# Patient Record
Sex: MALE | Race: WHITE | NOT HISPANIC OR LATINO | Employment: FULL TIME | URBAN - METROPOLITAN AREA
[De-identification: names, ages, dates, MRNs, and addresses within clinical notes are randomized per-mention and may not be internally consistent; named-entity substitution may affect disease eponyms.]

---

## 2017-02-08 ENCOUNTER — PATIENT OUTREACH (OUTPATIENT)
Dept: ADMINISTRATIVE | Facility: HOSPITAL | Age: 52
End: 2017-02-08

## 2017-02-16 ENCOUNTER — PATIENT OUTREACH (OUTPATIENT)
Dept: ADMINISTRATIVE | Facility: HOSPITAL | Age: 52
End: 2017-02-16

## 2017-02-16 NOTE — LETTER
February 16, 2017    Rafael Gautam   [address] [address] [address]             [facility]  [address]  [facility] [address]  Phone: [phone] Dear Mr. Gautam:    We have tried to reach you by hanna unsuccessfully.  [facility] is committed to your overall health.  To help you get the most out of each of your visits, we will review your information to make sure you are up to date on all of your recommended tests and/or procedures.       Dr. Church         has found that you may be due for:     Pneumonia immunization   Influenza vaccine     REMINDER: lab appointment 2/18/17     Please bring your home blood pressure machine with you so that we may test for its accuracy.     If you have had any of the above done at another facility, please bring the records or information with you so that your record at [facility] will be complete.      If you are currently taking medication , please bring it with you to your appointment for review.    Sincerely,  Clarita Springer  Clinical Care Coordinator  [facility] [address] [facility] [address]  [place] [address]  Phone: [phone]   Fax: [phone]

## 2017-02-18 ENCOUNTER — LAB VISIT (OUTPATIENT)
Dept: LAB | Facility: HOSPITAL | Age: 52
End: 2017-02-18
Attending: FAMILY MEDICINE
Payer: COMMERCIAL

## 2017-02-18 DIAGNOSIS — E78.5 HLD (HYPERLIPIDEMIA): ICD-10-CM

## 2017-02-18 DIAGNOSIS — E79.0 HYPERURICEMIA: ICD-10-CM

## 2017-02-18 DIAGNOSIS — D51.9 ANEMIA DUE TO VITAMIN B12 DEFICIENCY: ICD-10-CM

## 2017-02-18 LAB
ALBUMIN SERPL BCP-MCNC: 4 G/DL
ALP SERPL-CCNC: 60 U/L
ALT SERPL W/O P-5'-P-CCNC: 33 U/L
ANION GAP SERPL CALC-SCNC: 6 MMOL/L
AST SERPL-CCNC: 27 U/L
BASOPHILS # BLD AUTO: 0.02 K/UL
BASOPHILS NFR BLD: 0.2 %
BILIRUB SERPL-MCNC: 0.4 MG/DL
BUN SERPL-MCNC: 17 MG/DL
CALCIUM SERPL-MCNC: 9.3 MG/DL
CHLORIDE SERPL-SCNC: 106 MMOL/L
CHOLEST/HDLC SERPL: 3.3 {RATIO}
CO2 SERPL-SCNC: 28 MMOL/L
CREAT SERPL-MCNC: 1 MG/DL
DIFFERENTIAL METHOD: NORMAL
EOSINOPHIL # BLD AUTO: 0.2 K/UL
EOSINOPHIL NFR BLD: 2.8 %
ERYTHROCYTE [DISTWIDTH] IN BLOOD BY AUTOMATED COUNT: 13.1 %
EST. GFR  (AFRICAN AMERICAN): >60 ML/MIN/1.73 M^2
EST. GFR  (NON AFRICAN AMERICAN): >60 ML/MIN/1.73 M^2
GLUCOSE SERPL-MCNC: 99 MG/DL
HCT VFR BLD AUTO: 43.4 %
HDL/CHOLESTEROL RATIO: 30.1 %
HDLC SERPL-MCNC: 113 MG/DL
HDLC SERPL-MCNC: 34 MG/DL
HGB BLD-MCNC: 14 G/DL
LDLC SERPL CALC-MCNC: 60.2 MG/DL
LYMPHOCYTES # BLD AUTO: 3.9 K/UL
LYMPHOCYTES NFR BLD: 47.8 %
MCH RBC QN AUTO: 29.7 PG
MCHC RBC AUTO-ENTMCNC: 32.3 %
MCV RBC AUTO: 92 FL
MONOCYTES # BLD AUTO: 0.6 K/UL
MONOCYTES NFR BLD: 7.6 %
NEUTROPHILS # BLD AUTO: 3.4 K/UL
NEUTROPHILS NFR BLD: 41.5 %
NONHDLC SERPL-MCNC: 79 MG/DL
PLATELET # BLD AUTO: 253 K/UL
PMV BLD AUTO: 9.3 FL
POTASSIUM SERPL-SCNC: 4.2 MMOL/L
PROT SERPL-MCNC: 7 G/DL
RBC # BLD AUTO: 4.72 M/UL
SODIUM SERPL-SCNC: 140 MMOL/L
TRIGL SERPL-MCNC: 94 MG/DL
URATE SERPL-MCNC: 6.2 MG/DL
WBC # BLD AUTO: 8.13 K/UL

## 2017-02-18 PROCEDURE — 36415 COLL VENOUS BLD VENIPUNCTURE: CPT | Mod: PO

## 2017-02-18 PROCEDURE — 80061 LIPID PANEL: CPT

## 2017-02-18 PROCEDURE — 85025 COMPLETE CBC W/AUTO DIFF WBC: CPT

## 2017-02-18 PROCEDURE — 80053 COMPREHEN METABOLIC PANEL: CPT

## 2017-02-18 PROCEDURE — 84550 ASSAY OF BLOOD/URIC ACID: CPT

## 2017-02-22 ENCOUNTER — OFFICE VISIT (OUTPATIENT)
Dept: FAMILY MEDICINE | Facility: CLINIC | Age: 52
End: 2017-02-22
Payer: COMMERCIAL

## 2017-02-22 VITALS
SYSTOLIC BLOOD PRESSURE: 142 MMHG | TEMPERATURE: 97 F | HEIGHT: 66 IN | BODY MASS INDEX: 31.89 KG/M2 | HEART RATE: 64 BPM | DIASTOLIC BLOOD PRESSURE: 70 MMHG | WEIGHT: 198.44 LBS

## 2017-02-22 DIAGNOSIS — E78.5 HYPERLIPIDEMIA, UNSPECIFIED HYPERLIPIDEMIA TYPE: ICD-10-CM

## 2017-02-22 DIAGNOSIS — D51.9 ANEMIA DUE TO VITAMIN B12 DEFICIENCY, UNSPECIFIED B12 DEFICIENCY TYPE: ICD-10-CM

## 2017-02-22 DIAGNOSIS — Z00.00 PREVENTATIVE HEALTH CARE: ICD-10-CM

## 2017-02-22 DIAGNOSIS — I10 ESSENTIAL HYPERTENSION: Primary | Chronic | ICD-10-CM

## 2017-02-22 DIAGNOSIS — M1A.9XX0 CHRONIC GOUT WITHOUT TOPHUS, UNSPECIFIED CAUSE, UNSPECIFIED SITE: ICD-10-CM

## 2017-02-22 DIAGNOSIS — E79.0 HYPERURICEMIA: ICD-10-CM

## 2017-02-22 DIAGNOSIS — I25.10 CORONARY ARTERY DISEASE INVOLVING NATIVE CORONARY ARTERY OF NATIVE HEART WITHOUT ANGINA PECTORIS: ICD-10-CM

## 2017-02-22 PROCEDURE — 90471 IMMUNIZATION ADMIN: CPT | Mod: S$GLB,,, | Performed by: FAMILY MEDICINE

## 2017-02-22 PROCEDURE — 3078F DIAST BP <80 MM HG: CPT | Mod: S$GLB,,, | Performed by: FAMILY MEDICINE

## 2017-02-22 PROCEDURE — 99214 OFFICE O/P EST MOD 30 MIN: CPT | Mod: 25,S$GLB,, | Performed by: FAMILY MEDICINE

## 2017-02-22 PROCEDURE — 99999 PR PBB SHADOW E&M-EST. PATIENT-LVL III: CPT | Mod: PBBFAC,,, | Performed by: FAMILY MEDICINE

## 2017-02-22 PROCEDURE — 90686 IIV4 VACC NO PRSV 0.5 ML IM: CPT | Mod: S$GLB,,, | Performed by: FAMILY MEDICINE

## 2017-02-22 PROCEDURE — 3077F SYST BP >= 140 MM HG: CPT | Mod: S$GLB,,, | Performed by: FAMILY MEDICINE

## 2017-02-22 PROCEDURE — 1160F RVW MEDS BY RX/DR IN RCRD: CPT | Mod: S$GLB,,, | Performed by: FAMILY MEDICINE

## 2017-02-22 NOTE — MR AVS SNAPSHOT
[facility]  [address] [facility] [address]  [facility] [address]  Phone: [phone]  Fax: [phone]                  Rafael Gautam Jr.   2017 8:00 AM   Office Visit    Description:  Male : 1965   Provider:  Pj Church MD   Department:  [facility]           Reason for Visit     Hypertension           Diagnoses this Visit        Comments    Essential hypertension    -  Primary     Hyperlipidemia, unspecified hyperlipidemia type         Hyperuricemia         Chronic gout without tophus, unspecified cause, unspecified site         Coronary artery disease involving native coronary artery of native heart without angina pectoris         Anemia due to vitamin B12 deficiency, unspecified B12 deficiency type         Preventative health care                To Do List           Future Appointments        Provider Department Dept Phone    2017 9:00 AM [facility] [place] [facility] [phone]    2017 7:40 AM Pj Church MD [facility] [phone]      Goals (5 Years of Data)     None      Follow-Up and Disposition     Return in about 6 months (around 2017).      [facility] On Call     [facility] On Call Nurse [facility] -  Assistance  Registered nurses in the [facility] provide clinical advisement, health education, appointment booking, and other advisory services.  Call for this free service at [phone].             Medications           Message regarding Medications     Verify the changes and/or additions to your medication regime listed below are the same as discussed with your clinician today.  If any of these changes or additions are incorrect, please notify your healthcare provider.             Verify that the below list of medications is an accurate representation of the medications you are currently taking.  If none reported, the list may be blank. If incorrect, please contact your healthcare  "provider. Carry this list with you in case of emergency.           Current Medications     allopurinol (ZYLOPRIM) 300 MG tablet Take 1 tablet (300 mg total) by mouth once daily.    amlodipine (NORVASC) 2.5 MG tablet Take 1 tablet (2.5 mg total) by mouth every evening.    aspirin 81 mg Tab Take 81 mg by mouth once daily.      b complex vitamins capsule Take 1 capsule by mouth once daily.    clopidogrel (PLAVIX) 75 mg tablet Take 1 tablet (75 mg total) by mouth once daily.    ezetimibe (ZETIA) 10 mg tablet Take 1 tablet (10 mg total) by mouth once daily.    folic acid (FOLVITE) 400 MCG tablet Take 1 tablet (400 mcg total) by mouth once daily.    nitroGLYCERIN (NITROSTAT) 0.4 MG SL tablet Place 1 tablet (0.4 mg total) under the tongue every 5 (five) minutes as needed.    rosuvastatin (CRESTOR) 10 MG tablet Take 1 tablet (10 mg total) by mouth every evening.    valsartan (DIOVAN) 160 MG tablet Take 1 tablet (160 mg total) by mouth every morning.           Clinical Reference Information           Your Vitals Were     BP Pulse Temp Height Weight BMI    142/70 (BP Location: Left arm, Patient Position: Sitting, BP Method: Manual) 64 97.4 °F (36.3 °C) (Oral) 5' 6" (1.676 m) 90 kg (198 lb 6.6 oz) 32.02 kg/m2      Blood Pressure          Most Recent Value    BP  (!)  142/70      Allergies as of 2/22/2017     Codeine    Niacin    Penicillins      Immunizations Administered on Date of Encounter - 2/22/2017     Name Date Dose VIS Date Route    influenza - Quadrivalent - PF (ADULT)  Incomplete 0.5 mL 8/7/2015 Intramuscular      Orders Placed During Today's Visit      Normal Orders This Visit    Influenza - Quadrivalent (3 years & older) (PF)     Future Labs/Procedures Expected by Expires    Comprehensive metabolic panel  8/21/2017 4/23/2018    Lipid panel  8/21/2017 2/22/2018    PSA, Screening  8/21/2017 4/23/2018    Uric acid  8/21/2017 4/23/2018      Language Assistance Services     ATTENTION: Language assistance services are " available, free of charge. Please call [phone].      ATENCIÓN: Si habla tato, tiene a jung disposición servicios gratuitos de asistencia lingüística. Llame al [phone].     CHÚ Ý: N?u b?n nói Ti?ng Vi?t, có các d?ch v? h? tr? ngôn ng? mi?n phí dành cho b?n. G?i s? [phone].         [facility] complies with applicable Federal civil rights laws and does not discriminate on the basis of race, color, national origin, age, disability, or sex.

## 2017-02-22 NOTE — PROGRESS NOTES
Subjective:       Patient ID: Rafael Gautam Jr. is a 51 y.o. male.    Chief Complaint: Hypertension (6 month follow up with labs prior: Flu today, Pneum 23 ?)    HPI Comments: Here today for 6 month f/u on chronic health issues  He reports overall feeling well.  HTN - tolerating Diovan 160mg and amlodipine 2.5mg daily; home /70  HLD - tolerating Crestor 10mg and Zetia 10mg daily  CAD s/p cardiac stents - tolerating Aspirin 81mg, Plavix 75mg daily  Gout - uses indomethacin PRN flares; no flares in the last 6 months; He is taking allopurinol 300mg daily  b12 deficiency - tolerating oral B12     Past Medical History   Diagnosis Date    Anemia     B12 deficiency anemia     CAD (coronary artery disease)     Carotid atherosclerosis     Hemorrhoid     HLD (hyperlipidemia)     Hypertension     Hyperuricemia      right foot and left wrist    Stroke 1/2012     Current Outpatient Prescriptions on File Prior to Visit   Medication Sig Dispense Refill    allopurinol (ZYLOPRIM) 300 MG tablet Take 1 tablet (300 mg total) by mouth once daily. 90 tablet 3    amlodipine (NORVASC) 2.5 MG tablet Take 1 tablet (2.5 mg total) by mouth every evening. 90 tablet 3    aspirin 81 mg Tab Take 81 mg by mouth once daily.        b complex vitamins capsule Take 1 capsule by mouth once daily. 90 capsule 3    clopidogrel (PLAVIX) 75 mg tablet Take 1 tablet (75 mg total) by mouth once daily. 90 tablet 3    ezetimibe (ZETIA) 10 mg tablet Take 1 tablet (10 mg total) by mouth once daily. 90 tablet 3    folic acid (FOLVITE) 400 MCG tablet Take 1 tablet (400 mcg total) by mouth once daily. 90 tablet 3    rosuvastatin (CRESTOR) 10 MG tablet Take 1 tablet (10 mg total) by mouth every evening. 90 tablet 3    valsartan (DIOVAN) 160 MG tablet Take 1 tablet (160 mg total) by mouth every morning. 90 tablet 3    nitroGLYCERIN (NITROSTAT) 0.4 MG SL tablet Place 1 tablet (0.4 mg total) under the tongue every 5 (five) minutes as needed. 25  "tablet 6     No current facility-administered medications on file prior to visit.        Prevent:  Colonoscopy 2/12 - normal    Review of Systems   Constitutional: Negative for fever, appetite change and unexpected weight change.   HENT: Negative for ear pain, nosebleeds, congestion, sore throat, rhinorrhea, trouble swallowing, neck pain, postnasal drip, sinus pressure and ear discharge.    Eyes: Negative for pain, discharge, redness and visual disturbance.   Respiratory: Negative for apnea, cough, chest tightness, shortness of breath and wheezing.    Cardiovascular: Negative for chest pain, palpitations and leg swelling.   Gastrointestinal: Negative for nausea, vomiting, abdominal pain, diarrhea, constipation, blood in stool and abdominal distention.   Genitourinary: Negative for dysuria, urgency, frequency, hematuria, flank pain, scrotal swelling, difficulty urinating and testicular pain.   Skin: Negative for color change, rash and wound.   Neurological: Negative for dizziness, tremors, seizures, syncope, speech difficulty, weakness, light-headedness and numbness.   Hematological: Negative for adenopathy. Does not bruise/bleed easily.   Psychiatric/Behavioral: Negative for suicidal ideas, hallucinations, behavioral problems and confusion.   Intermittent right knee pains      Objective:      Visit Vitals    BP (!) 142/70 (BP Location: Left arm, Patient Position: Sitting, BP Method: Manual)    Pulse 64    Temp 97.4 °F (36.3 °C) (Oral)    Ht 5' 6" (1.676 m)    Wt 90 kg (198 lb 6.6 oz)    BMI 32.02 kg/m2     Physical Exam   Constitutional: He is oriented to person, place, and time. He appears well-developed and well-nourished. He is active and cooperative.   Neck: Trachea normal, normal range of motion and full passive range of motion without pain. Neck supple. Normal carotid pulses and no JVD present. Carotid bruit is not present. No tracheal deviation present. No mass and no thyromegaly present. "   Cardiovascular: Normal rate, regular rhythm, S1 normal, S2 normal, normal heart sounds and intact distal pulses.  Exam reveals no gallop and no friction rub.    No murmur heard.  Pulmonary/Chest: Effort normal and breath sounds normal. No respiratory distress. He has no wheezes. He has no rales.   Abdominal: Soft. Normal appearance and bowel sounds are normal. He exhibits no distension and no mass. There is no hepatosplenomegaly. There is no tenderness. There is no rebound and no guarding.   Musculoskeletal: Normal range of motion. He exhibits no edema and no tenderness.   Neurological: He is alert and oriented to person, place, and time. He has normal strength. No cranial nerve deficit. Coordination normal.   Skin: Skin is warm, dry and intact. No rash noted. No cyanosis or erythema. No pallor. Nails show no clubbing.   Psychiatric: He has a normal mood and affect. His speech is normal and behavior is normal. Judgment and thought content normal.       Results for orders placed or performed in visit on 02/18/17   Uric acid   Result Value Ref Range    Uric Acid 6.2 3.4 - 7.0 mg/dL   Comprehensive metabolic panel   Result Value Ref Range    Sodium 140 136 - 145 mmol/L    Potassium 4.2 3.5 - 5.1 mmol/L    Chloride 106 95 - 110 mmol/L    CO2 28 23 - 29 mmol/L    Glucose 99 70 - 110 mg/dL    BUN, Bld 17 6 - 20 mg/dL    Creatinine 1.0 0.5 - 1.4 mg/dL    Calcium 9.3 8.7 - 10.5 mg/dL    Total Protein 7.0 6.0 - 8.4 g/dL    Albumin 4.0 3.5 - 5.2 g/dL    Total Bilirubin 0.4 0.1 - 1.0 mg/dL    Alkaline Phosphatase 60 55 - 135 U/L    AST 27 10 - 40 U/L    ALT 33 10 - 44 U/L    Anion Gap 6 (L) 8 - 16 mmol/L    eGFR if African American >60.0 >60 mL/min/1.73 m^2    eGFR if non African American >60.0 >60 mL/min/1.73 m^2   Lipid panel   Result Value Ref Range    Cholesterol 113 (L) 120 - 199 mg/dL    Triglycerides 94 30 - 150 mg/dL    HDL 34 (L) 40 - 75 mg/dL    LDL Cholesterol 60.2 (L) 63.0 - 159.0 mg/dL    HDL/Chol Ratio 30.1  20.0 - 50.0 %    Total Cholesterol/HDL Ratio 3.3 2.0 - 5.0    Non-HDL Cholesterol 79 mg/dL   CBC auto differential   Result Value Ref Range    WBC 8.13 3.90 - 12.70 K/uL    RBC 4.72 4.60 - 6.20 M/uL    Hemoglobin 14.0 14.0 - 18.0 g/dL    Hematocrit 43.4 40.0 - 54.0 %    MCV 92 82 - 98 fL    MCH 29.7 27.0 - 31.0 pg    MCHC 32.3 32.0 - 36.0 %    RDW 13.1 11.5 - 14.5 %    Platelets 253 150 - 350 K/uL    MPV 9.3 9.2 - 12.9 fL    Gran # 3.4 1.8 - 7.7 K/uL    Lymph # 3.9 1.0 - 4.8 K/uL    Mono # 0.6 0.3 - 1.0 K/uL    Eos # 0.2 0.0 - 0.5 K/uL    Baso # 0.02 0.00 - 0.20 K/uL    Gran% 41.5 38.0 - 73.0 %    Lymph% 47.8 18.0 - 48.0 %    Mono% 7.6 4.0 - 15.0 %    Eosinophil% 2.8 0.0 - 8.0 %    Basophil% 0.2 0.0 - 1.9 %    Differential Method Automated        Assessment:       1. Essential hypertension    2. Hyperlipidemia, unspecified hyperlipidemia type    3. Hyperuricemia    4. Chronic gout without tophus, unspecified cause, unspecified site    5. Coronary artery disease involving native coronary artery of native heart without angina pectoris    6. Anemia due to vitamin B12 deficiency, unspecified B12 deficiency type    7. Preventative health care        Plan:       Essential hypertension    Hyperlipidemia, unspecified hyperlipidemia type    Hyperuricemia    Chronic gout without tophus, unspecified cause, unspecified site    Coronary artery disease involving native coronary artery of native heart without angina pectoris    Anemia due to vitamin B12 deficiency, unspecified B12 deficiency type    Preventative health care  -     Influenza - Quadrivalent (3 years & older) (PF)  -     Pneumococcal Polysaccharide Vaccine (23 Valent) (SQ/IM)  -     Comprehensive metabolic panel; Future; Expected date: 8/21/17  -     Lipid panel; Future; Expected date: 8/21/17  -     PSA, Screening; Future; Expected date: 8/21/17  -     Uric acid; Future; Expected date: 8/21/17        Hypertension (controlled)  Continue present meds    Hld  (hyperlipidemia)  Continue Zetia and Crestor    Cad (coronary artery disease)  Continue f/u with cardiology    Gout, chronic  Continue indomethacin PRN  Continue allopurinol daily    B12 deficiency anemia  Continue oral B12 1,000mcg daily    Counseled on regular exercise, maintenance of a healthy weight, balanced diet rich in fruits/vegetables and lean protein, and avoidance of unhealthy habits like smoking and excessive alcohol intake.  Goal weight of 190    F/u 6 months with CMP, lipid profile, uric acid, CBC, B12

## 2017-03-17 ENCOUNTER — TELEPHONE (OUTPATIENT)
Dept: FAMILY MEDICINE | Facility: CLINIC | Age: 52
End: 2017-03-17

## 2017-03-17 NOTE — TELEPHONE ENCOUNTER
----- Message from Aquiles Suarez sent at 3/17/2017  8:51 AM CDT -----  Contact: Wife Shiela  He is having bad muscle cramps in his leg, he says it feels like the pain is shooting up to his stomach. He states the cramping feels like jose manuel horse and has gotten worse over past week. Feels like being squeezed with pliers on his right leg. Please see if he can be seen today. I gave appointment for Monday. Call [phone]. Thanks!

## 2017-03-20 ENCOUNTER — HOSPITAL ENCOUNTER (OUTPATIENT)
Dept: RADIOLOGY | Facility: HOSPITAL | Age: 52
Discharge: HOME | End: 2017-03-20
Attending: NURSE PRACTITIONER
Payer: COMMERCIAL

## 2017-03-20 ENCOUNTER — OFFICE VISIT (OUTPATIENT)
Dept: FAMILY MEDICINE | Facility: CLINIC | Age: 52
End: 2017-03-20
Payer: COMMERCIAL

## 2017-03-20 VITALS
BODY MASS INDEX: 32.17 KG/M2 | HEIGHT: 66 IN | HEART RATE: 76 BPM | DIASTOLIC BLOOD PRESSURE: 90 MMHG | WEIGHT: 200.19 LBS | TEMPERATURE: 98 F | SYSTOLIC BLOOD PRESSURE: 144 MMHG

## 2017-03-20 DIAGNOSIS — I25.10 CORONARY ARTERY DISEASE INVOLVING NATIVE CORONARY ARTERY OF NATIVE HEART WITHOUT ANGINA PECTORIS: ICD-10-CM

## 2017-03-20 DIAGNOSIS — I10 ESSENTIAL HYPERTENSION: ICD-10-CM

## 2017-03-20 DIAGNOSIS — R25.2 MUSCLE CRAMP: ICD-10-CM

## 2017-03-20 DIAGNOSIS — R25.2 MUSCLE CRAMP: Primary | ICD-10-CM

## 2017-03-20 DIAGNOSIS — E78.5 HYPERLIPIDEMIA, UNSPECIFIED HYPERLIPIDEMIA TYPE: ICD-10-CM

## 2017-03-20 PROCEDURE — 99999 PR PBB SHADOW E&M-EST. PATIENT-LVL III: CPT | Mod: PBBFAC,,, | Performed by: NURSE PRACTITIONER

## 2017-03-20 PROCEDURE — 3080F DIAST BP >= 90 MM HG: CPT | Mod: S$GLB,,, | Performed by: NURSE PRACTITIONER

## 2017-03-20 PROCEDURE — 3077F SYST BP >= 140 MM HG: CPT | Mod: S$GLB,,, | Performed by: NURSE PRACTITIONER

## 2017-03-20 PROCEDURE — 93971 EXTREMITY STUDY: CPT | Mod: TC,PO

## 2017-03-20 PROCEDURE — 99214 OFFICE O/P EST MOD 30 MIN: CPT | Mod: S$GLB,,, | Performed by: NURSE PRACTITIONER

## 2017-03-20 PROCEDURE — 1160F RVW MEDS BY RX/DR IN RCRD: CPT | Mod: S$GLB,,, | Performed by: NURSE PRACTITIONER

## 2017-03-20 PROCEDURE — 93971 EXTREMITY STUDY: CPT | Mod: 26,,, | Performed by: RADIOLOGY

## 2017-03-20 NOTE — PROGRESS NOTES
Subjective:       Patient ID: Rafael Gautam Jr. is a 51 y.o. male.    Chief Complaint: leg cramp (right leg feels like its getting cramped up. Symptoms since hestarted with his cholesterol medicine..Pain from cramp starts from abdominal area all the way down the leg,very painful)    HPI Comments: Cramping in leg from putting his leg up on his bed- that is high - cramp is in upper part of leg right leg - no jose manuel horse   Felt like not like a CRAMP - - was sitting in whirlpool tub       Leg Pain    The incident occurred 5 to 7 days ago. The incident occurred at home. There was no injury mechanism. Pain location: right leg - cramping  The quality of the pain is described as aching. Pertinent negatives include no inability to bear weight, loss of motion, loss of sensation, muscle weakness, numbness or tingling. The symptoms are aggravated by weight bearing. The treatment provided no relief.     Vitals:    03/20/17 0812   BP: (!) 144/90   Pulse: 76   Temp: 98 °F (36.7 °C)     Review of Systems   Constitutional: Negative.  Negative for fatigue and fever.   HENT: Negative.    Eyes: Negative.    Respiratory: Negative.  Negative for cough and shortness of breath.    Cardiovascular: Negative.  Negative for chest pain.   Gastrointestinal: Negative.  Negative for abdominal pain, diarrhea and nausea.   Endocrine: Negative.    Genitourinary: Negative.  Negative for dysuria and hematuria.   Musculoskeletal: Positive for arthralgias and myalgias.   Skin: Negative.  Negative for color change and rash.   Allergic/Immunologic: Negative.    Neurological: Negative.  Negative for tingling and numbness.   Hematological: Negative.    Psychiatric/Behavioral: Negative.        Past Medical History:   Diagnosis Date    Anemia     B12 deficiency anemia     CAD (coronary artery disease)     Carotid atherosclerosis     Hemorrhoid     HLD (hyperlipidemia)     Hypertension     Hyperuricemia     right foot and left wrist    Stroke  1/2012     Objective:      Physical Exam   Constitutional: He is oriented to person, place, and time. He appears well-developed and well-nourished.   HENT:   Head: Normocephalic and atraumatic.   Mouth/Throat: Oropharynx is clear and moist.   Eyes: Conjunctivae and EOM are normal. Pupils are equal, round, and reactive to light.   Neck: Normal range of motion. Neck supple.   Cardiovascular: Normal rate, regular rhythm, normal heart sounds and intact distal pulses.  Exam reveals no friction rub.    No murmur heard.  Pulmonary/Chest: Effort normal and breath sounds normal. No respiratory distress. He has no wheezes. He has no rales.   Abdominal: Soft. Bowel sounds are normal.   Musculoskeletal: Normal range of motion.        Legs:  Neurological: He is alert and oriented to person, place, and time.   Skin: Skin is warm and dry.   Psychiatric: He has a normal mood and affect. His behavior is normal. Judgment and thought content normal.   Nursing note and vitals reviewed.      Assessment:       1. Muscle cramp    2. Essential hypertension    3. Hyperlipidemia, unspecified hyperlipidemia type    4. Coronary artery disease involving native coronary artery of native heart without angina pectoris        Plan:       Muscle cramp  -     CK; Future; Expected date: 3/20/17  -     Comprehensive metabolic panel; Future; Expected date: 3/20/17  -     Magnesium; Future; Expected date: 3/20/17  -    -     US Lower Extremity Veins Right; Future; Expected date: 3/20/17    Essential hypertension  -     -     US Lower Extremity Veins Right; Future; Expected date: 3/20/17    Hyperlipidemia, unspecified hyperlipidemia type  On crestor and zetia= discussed that seems that this is not the cause  Think that he pulled muscle in his leg trying to get up on his bed   He  Was getting up on the bed    Coronary artery disease involving native coronary artery of native heart without angina pectoris  -     US Lower Extremity Veins Right; Future;  Expected date: 3/20/17    Fu if not better  Will call with labs and US

## 2017-07-06 RX ORDER — ALLOPURINOL 300 MG/1
TABLET ORAL
Qty: 90 TABLET | Refills: 1 | Status: SHIPPED | OUTPATIENT
Start: 2017-07-06 | End: 2018-01-02 | Stop reason: SDUPTHER

## 2017-07-06 RX ORDER — ROSUVASTATIN CALCIUM 10 MG/1
10 TABLET, COATED ORAL NIGHTLY
Qty: 90 TABLET | Refills: 1 | Status: SHIPPED | OUTPATIENT
Start: 2017-07-06 | End: 2017-08-22 | Stop reason: SDUPTHER

## 2017-07-10 ENCOUNTER — CLINICAL SUPPORT (OUTPATIENT)
Dept: CARDIOLOGY | Facility: CLINIC | Age: 52
End: 2017-07-10
Payer: COMMERCIAL

## 2017-07-10 DIAGNOSIS — I63.519: Chronic | ICD-10-CM

## 2017-07-10 DIAGNOSIS — I10 ESSENTIAL HYPERTENSION: Chronic | ICD-10-CM

## 2017-07-10 DIAGNOSIS — I25.10 CORONARY ARTERY DISEASE INVOLVING NATIVE CORONARY ARTERY OF NATIVE HEART WITHOUT ANGINA PECTORIS: ICD-10-CM

## 2017-07-10 DIAGNOSIS — Z82.49 FAMILY HISTORY OF ISCHEMIC HEART DISEASE (IHD): ICD-10-CM

## 2017-07-10 DIAGNOSIS — E78.5 HLD (HYPERLIPIDEMIA): ICD-10-CM

## 2017-07-10 DIAGNOSIS — Z98.61 HISTORY OF PTCA: Chronic | ICD-10-CM

## 2017-07-10 DIAGNOSIS — I51.7 CARDIOMEGALY: ICD-10-CM

## 2017-07-10 LAB
DIASTOLIC DYSFUNCTION: NO
INTERNAL CAROTID STENOSIS: NORMAL
MITRAL VALVE MOBILITY: NORMAL
RETIRED EF AND QEF - SEE NOTES: 55 (ref 55–65)
TRICUSPID VALVE REGURGITATION: NORMAL

## 2017-07-10 PROCEDURE — 93880 EXTRACRANIAL BILAT STUDY: CPT | Mod: S$GLB,,, | Performed by: INTERNAL MEDICINE

## 2017-07-10 PROCEDURE — 93306 TTE W/DOPPLER COMPLETE: CPT | Mod: S$GLB,,, | Performed by: INTERNAL MEDICINE

## 2017-07-11 ENCOUNTER — TELEPHONE (OUTPATIENT)
Dept: CARDIOLOGY | Facility: CLINIC | Age: 52
End: 2017-07-11

## 2017-07-11 NOTE — TELEPHONE ENCOUNTER
Spoke with pt's wife to let them know his test results came back within normal limits and to continue current medication. She verbally undertsood and will relay the message to her .

## 2017-08-18 DIAGNOSIS — Z12.11 COLON CANCER SCREENING: ICD-10-CM

## 2017-08-19 ENCOUNTER — LAB VISIT (OUTPATIENT)
Dept: LAB | Facility: HOSPITAL | Age: 52
End: 2017-08-19
Attending: FAMILY MEDICINE
Payer: COMMERCIAL

## 2017-08-19 DIAGNOSIS — Z00.00 PREVENTATIVE HEALTH CARE: ICD-10-CM

## 2017-08-19 LAB
ALBUMIN SERPL BCP-MCNC: 4.1 G/DL
ALP SERPL-CCNC: 57 U/L
ALT SERPL W/O P-5'-P-CCNC: 23 U/L
ANION GAP SERPL CALC-SCNC: 8 MMOL/L
AST SERPL-CCNC: 19 U/L
BILIRUB SERPL-MCNC: 0.5 MG/DL
BUN SERPL-MCNC: 19 MG/DL
CALCIUM SERPL-MCNC: 9.7 MG/DL
CHLORIDE SERPL-SCNC: 104 MMOL/L
CHOLEST/HDLC SERPL: 3.9 {RATIO}
CO2 SERPL-SCNC: 27 MMOL/L
COMPLEXED PSA SERPL-MCNC: 0.57 NG/ML
CREAT SERPL-MCNC: 1 MG/DL
EST. GFR  (AFRICAN AMERICAN): >60 ML/MIN/1.73 M^2
EST. GFR  (NON AFRICAN AMERICAN): >60 ML/MIN/1.73 M^2
GLUCOSE SERPL-MCNC: 96 MG/DL
HDL/CHOLESTEROL RATIO: 25.7 %
HDLC SERPL-MCNC: 140 MG/DL
HDLC SERPL-MCNC: 36 MG/DL
LDLC SERPL CALC-MCNC: 76.4 MG/DL
NONHDLC SERPL-MCNC: 104 MG/DL
POTASSIUM SERPL-SCNC: 4.5 MMOL/L
PROT SERPL-MCNC: 7.2 G/DL
SODIUM SERPL-SCNC: 139 MMOL/L
TRIGL SERPL-MCNC: 138 MG/DL
URATE SERPL-MCNC: 6.6 MG/DL

## 2017-08-19 PROCEDURE — 84153 ASSAY OF PSA TOTAL: CPT

## 2017-08-19 PROCEDURE — 80061 LIPID PANEL: CPT

## 2017-08-19 PROCEDURE — 80053 COMPREHEN METABOLIC PANEL: CPT

## 2017-08-19 PROCEDURE — 36415 COLL VENOUS BLD VENIPUNCTURE: CPT | Mod: PO

## 2017-08-19 PROCEDURE — 84550 ASSAY OF BLOOD/URIC ACID: CPT

## 2017-08-22 ENCOUNTER — OFFICE VISIT (OUTPATIENT)
Dept: CARDIOLOGY | Facility: CLINIC | Age: 52
End: 2017-08-22
Payer: COMMERCIAL

## 2017-08-22 ENCOUNTER — OFFICE VISIT (OUTPATIENT)
Dept: FAMILY MEDICINE | Facility: CLINIC | Age: 52
End: 2017-08-22
Payer: COMMERCIAL

## 2017-08-22 VITALS
TEMPERATURE: 98 F | HEART RATE: 64 BPM | SYSTOLIC BLOOD PRESSURE: 128 MMHG | RESPIRATION RATE: 18 BRPM | WEIGHT: 199.5 LBS | HEIGHT: 66 IN | BODY MASS INDEX: 32.06 KG/M2 | DIASTOLIC BLOOD PRESSURE: 70 MMHG

## 2017-08-22 VITALS
SYSTOLIC BLOOD PRESSURE: 114 MMHG | BODY MASS INDEX: 31.75 KG/M2 | HEART RATE: 58 BPM | DIASTOLIC BLOOD PRESSURE: 74 MMHG | WEIGHT: 197.56 LBS | HEIGHT: 66 IN

## 2017-08-22 DIAGNOSIS — Z12.11 COLON CANCER SCREENING: ICD-10-CM

## 2017-08-22 DIAGNOSIS — Z82.49 FAMILY HISTORY OF ISCHEMIC HEART DISEASE (IHD): ICD-10-CM

## 2017-08-22 DIAGNOSIS — E78.2 MIXED HYPERLIPIDEMIA: ICD-10-CM

## 2017-08-22 DIAGNOSIS — D51.9 ANEMIA DUE TO VITAMIN B12 DEFICIENCY, UNSPECIFIED B12 DEFICIENCY TYPE: ICD-10-CM

## 2017-08-22 DIAGNOSIS — I25.10 CORONARY ARTERY DISEASE INVOLVING NATIVE CORONARY ARTERY OF NATIVE HEART WITHOUT ANGINA PECTORIS: ICD-10-CM

## 2017-08-22 DIAGNOSIS — E78.2 MIXED HYPERLIPIDEMIA: Primary | ICD-10-CM

## 2017-08-22 DIAGNOSIS — I10 ESSENTIAL HYPERTENSION: Chronic | ICD-10-CM

## 2017-08-22 DIAGNOSIS — E79.0 HYPERURICEMIA: ICD-10-CM

## 2017-08-22 DIAGNOSIS — Z98.61 HISTORY OF PTCA: Primary | Chronic | ICD-10-CM

## 2017-08-22 PROCEDURE — 3008F BODY MASS INDEX DOCD: CPT | Mod: S$GLB,,, | Performed by: INTERNAL MEDICINE

## 2017-08-22 PROCEDURE — 3074F SYST BP LT 130 MM HG: CPT | Mod: S$GLB,,, | Performed by: INTERNAL MEDICINE

## 2017-08-22 PROCEDURE — 99214 OFFICE O/P EST MOD 30 MIN: CPT | Mod: S$GLB,,, | Performed by: FAMILY MEDICINE

## 2017-08-22 PROCEDURE — 3008F BODY MASS INDEX DOCD: CPT | Mod: S$GLB,,, | Performed by: FAMILY MEDICINE

## 2017-08-22 PROCEDURE — 99214 OFFICE O/P EST MOD 30 MIN: CPT | Mod: S$GLB,,, | Performed by: INTERNAL MEDICINE

## 2017-08-22 PROCEDURE — 99999 PR PBB SHADOW E&M-EST. PATIENT-LVL III: CPT | Mod: PBBFAC,,, | Performed by: FAMILY MEDICINE

## 2017-08-22 PROCEDURE — 3078F DIAST BP <80 MM HG: CPT | Mod: S$GLB,,, | Performed by: FAMILY MEDICINE

## 2017-08-22 PROCEDURE — 3078F DIAST BP <80 MM HG: CPT | Mod: S$GLB,,, | Performed by: INTERNAL MEDICINE

## 2017-08-22 PROCEDURE — 3074F SYST BP LT 130 MM HG: CPT | Mod: S$GLB,,, | Performed by: FAMILY MEDICINE

## 2017-08-22 PROCEDURE — 99999 PR PBB SHADOW E&M-EST. PATIENT-LVL III: CPT | Mod: PBBFAC,,, | Performed by: INTERNAL MEDICINE

## 2017-08-22 RX ORDER — AMLODIPINE BESYLATE 2.5 MG/1
2.5 TABLET ORAL NIGHTLY
Qty: 90 TABLET | Refills: 3 | Status: SHIPPED | OUTPATIENT
Start: 2017-08-22 | End: 2018-08-21 | Stop reason: SDUPTHER

## 2017-08-22 RX ORDER — CLOPIDOGREL BISULFATE 75 MG/1
75 TABLET ORAL DAILY
Qty: 90 TABLET | Refills: 3 | Status: SHIPPED | OUTPATIENT
Start: 2017-08-22 | End: 2018-08-21 | Stop reason: SDUPTHER

## 2017-08-22 RX ORDER — ROSUVASTATIN CALCIUM 10 MG/1
10 TABLET, COATED ORAL NIGHTLY
Qty: 90 TABLET | Refills: 1 | Status: SHIPPED | OUTPATIENT
Start: 2017-08-22 | End: 2018-07-19 | Stop reason: SDUPTHER

## 2017-08-22 RX ORDER — VALSARTAN 80 MG/1
TABLET ORAL
Qty: 90 TABLET | Refills: 5 | Status: SHIPPED | OUTPATIENT
Start: 2017-08-22 | End: 2018-08-21 | Stop reason: SDUPTHER

## 2017-08-22 RX ORDER — EZETIMIBE 10 MG/1
10 TABLET ORAL DAILY
Qty: 90 TABLET | Refills: 3 | Status: SHIPPED | OUTPATIENT
Start: 2017-08-22 | End: 2018-08-21 | Stop reason: SDUPTHER

## 2017-08-22 NOTE — PROGRESS NOTES
Subjective:       Patient ID: Rafael Gautam Jr. is a 52 y.o. male.    Chief Complaint: Hypertension (6 month follow with labs prior) and Hyperlipidemia    HPI Comments: Here today for 6 month f/u on chronic health issues  He reports overall feeling well.  HTN - tolerating Diovan 80mg and amlodipine 2.5mg daily; home /70  HLD - tolerating Crestor 10mg and Zetia 10mg daily  CAD s/p cardiac stents - tolerating Aspirin 81mg, Plavix 75mg daily  Gout - uses indomethacin PRN flares; no flares in the last 6 months; He is taking allopurinol 300mg daily  b12 deficiency - tolerating oral B12     Past Medical History:   Diagnosis Date    Anemia     B12 deficiency anemia     CAD (coronary artery disease)     Carotid atherosclerosis     Hemorrhoid     HLD (hyperlipidemia)     Hypertension     Hyperuricemia     right foot and left wrist    Stroke 1/2012     Current Outpatient Prescriptions on File Prior to Visit   Medication Sig Dispense Refill    allopurinol (ZYLOPRIM) 300 MG tablet TAKE 1 TABLET BY MOUTH ONCE A DAY 90 tablet 1    aspirin 81 mg Tab Take 81 mg by mouth once daily.        b complex vitamins capsule Take 1 capsule by mouth once daily. 90 capsule 3    folic acid (FOLVITE) 400 MCG tablet Take 1 tablet (400 mcg total) by mouth once daily. 90 tablet 3    nitroGLYCERIN (NITROSTAT) 0.4 MG SL tablet Place 1 tablet (0.4 mg total) under the tongue every 5 (five) minutes as needed. 25 tablet 6    [DISCONTINUED] amlodipine (NORVASC) 2.5 MG tablet Take 1 tablet (2.5 mg total) by mouth every evening. 90 tablet 3    [DISCONTINUED] clopidogrel (PLAVIX) 75 mg tablet Take 1 tablet (75 mg total) by mouth once daily. 90 tablet 3    [DISCONTINUED] ezetimibe (ZETIA) 10 mg tablet Take 1 tablet (10 mg total) by mouth once daily. 90 tablet 3    [DISCONTINUED] rosuvastatin (CRESTOR) 10 MG tablet TAKE 1 TABLET (10 MG TOTAL) BY MOUTH EVERY EVENING. 90 tablet 1    [DISCONTINUED] valsartan (DIOVAN) 160 MG tablet Take  "1 tablet (160 mg total) by mouth every morning. 90 tablet 3     No current facility-administered medications on file prior to visit.        Prevent:  Colonoscopy 2/12 - normal    Review of Systems   Constitutional: Negative for fever, appetite change and unexpected weight change.   HENT: Negative for ear pain, nosebleeds, congestion, sore throat, rhinorrhea, trouble swallowing, neck pain, postnasal drip, sinus pressure and ear discharge.    Eyes: Negative for pain, discharge, redness and visual disturbance.   Respiratory: Negative for apnea, cough, chest tightness, shortness of breath and wheezing.    Cardiovascular: Negative for chest pain, palpitations and leg swelling.   Gastrointestinal: Negative for nausea, vomiting, abdominal pain, diarrhea, constipation, blood in stool and abdominal distention.   Genitourinary: Negative for dysuria, urgency, frequency, hematuria, flank pain, scrotal swelling, difficulty urinating and testicular pain.   Skin: Negative for color change, rash and wound.   Neurological: Negative for dizziness, tremors, seizures, syncope, speech difficulty, weakness, light-headedness and numbness.   Hematological: Negative for adenopathy. Does not bruise/bleed easily.   Psychiatric/Behavioral: Negative for suicidal ideas, hallucinations, behavioral problems and confusion.   Intermittent right knee pains      Objective:      /70 (BP Location: Left arm, Patient Position: Sitting, BP Method: Large (Manual))   Pulse 64   Temp 98 °F (36.7 °C) (Oral)   Resp 18   Ht 5' 6" (1.676 m)   Wt 90.5 kg (199 lb 8.3 oz)   BMI 32.20 kg/m²   Physical Exam   Constitutional: He is oriented to person, place, and time. He appears well-developed and well-nourished. He is active and cooperative.   Neck: Trachea normal, normal range of motion and full passive range of motion without pain. Neck supple. Normal carotid pulses and no JVD present. Carotid bruit is not present. No tracheal deviation present. No mass " and no thyromegaly present.   Cardiovascular: Normal rate, regular rhythm, S1 normal, S2 normal, normal heart sounds and intact distal pulses.  Exam reveals no gallop and no friction rub.    No murmur heard.  Pulmonary/Chest: Effort normal and breath sounds normal. No respiratory distress. He has no wheezes. He has no rales.   Abdominal: Soft. Normal appearance and bowel sounds are normal. He exhibits no distension and no mass. There is no hepatosplenomegaly. There is no tenderness. There is no rebound and no guarding.   Musculoskeletal: Normal range of motion. He exhibits no edema and no tenderness.   Neurological: He is alert and oriented to person, place, and time. He has normal strength. No cranial nerve deficit. Coordination normal.   Skin: Skin is warm, dry and intact. No rash noted. No cyanosis or erythema. No pallor. Nails show no clubbing.   Psychiatric: He has a normal mood and affect. His speech is normal and behavior is normal. Judgment and thought content normal.       Results for orders placed or performed in visit on 08/19/17   Comprehensive metabolic panel   Result Value Ref Range    Sodium 139 136 - 145 mmol/L    Potassium 4.5 3.5 - 5.1 mmol/L    Chloride 104 95 - 110 mmol/L    CO2 27 23 - 29 mmol/L    Glucose 96 70 - 110 mg/dL    BUN, Bld 19 6 - 20 mg/dL    Creatinine 1.0 0.5 - 1.4 mg/dL    Calcium 9.7 8.7 - 10.5 mg/dL    Total Protein 7.2 6.0 - 8.4 g/dL    Albumin 4.1 3.5 - 5.2 g/dL    Total Bilirubin 0.5 0.1 - 1.0 mg/dL    Alkaline Phosphatase 57 55 - 135 U/L    AST 19 10 - 40 U/L    ALT 23 10 - 44 U/L    Anion Gap 8 8 - 16 mmol/L    eGFR if African American >60.0 >60 mL/min/1.73 m^2    eGFR if non African American >60.0 >60 mL/min/1.73 m^2   Lipid panel   Result Value Ref Range    Cholesterol 140 120 - 199 mg/dL    Triglycerides 138 30 - 150 mg/dL    HDL 36 (L) 40 - 75 mg/dL    LDL Cholesterol 76.4 63.0 - 159.0 mg/dL    HDL/Chol Ratio 25.7 20.0 - 50.0 %    Total Cholesterol/HDL Ratio 3.9 2.0 -  5.0    Non-HDL Cholesterol 104 mg/dL   PSA, Screening   Result Value Ref Range    PSA, SCREEN 0.57 0.00 - 4.00 ng/mL   Uric acid   Result Value Ref Range    Uric Acid 6.6 3.4 - 7.0 mg/dL       Assessment:       1. Mixed hyperlipidemia    2. Essential hypertension    3. Coronary artery disease involving native coronary artery of native heart without angina pectoris    4. Anemia due to vitamin B12 deficiency, unspecified B12 deficiency type    5. Hyperuricemia    6. Colon cancer screening        Plan:       Mixed hyperlipidemia    Essential hypertension    Coronary artery disease involving native coronary artery of native heart without angina pectoris    Anemia due to vitamin B12 deficiency, unspecified B12 deficiency type    Hyperuricemia    Colon cancer screening  -     Case request GI: COLONOSCOPY      Hypertension (controlled)  Continue present meds    Hld (hyperlipidemia)  Continue Zetia and Crestor    Cad (coronary artery disease)  Continue f/u with cardiology    Gout, chronic  Continue indomethacin PRN  Continue allopurinol daily    B12 deficiency anemia  Continue oral B12 1,000mcg daily    Counseled on regular exercise, maintenance of a healthy weight, balanced diet rich in fruits/vegetables and lean protein, and avoidance of unhealthy habits like smoking and excessive alcohol intake.  Goal weight of 190    F/u 6 months

## 2017-08-22 NOTE — PROGRESS NOTES
Subjective:    Patient ID:  Rafael Gautam Jr. is a 52 y.o. male who presents for follow-up of No chief complaint on file.      HPI  Here for follow up of CAD-PCI 2012. Patients states is doing well no chest pain, SOB or change in exertional tolerence. Patient does not exercise but remains very active with out change in exertional tolerance or chest pain.     Review of Systems   Constitution: Negative for decreased appetite and malaise/fatigue.   HENT: Negative for congestion and nosebleeds.    Eyes: Negative for blurred vision.   Cardiovascular: Negative for chest pain, claudication, cyanosis, dyspnea on exertion, irregular heartbeat, leg swelling, near-syncope, orthopnea, palpitations, paroxysmal nocturnal dyspnea and syncope.   Respiratory: Negative for cough and shortness of breath.    Endocrine: Negative for polyuria.   Hematologic/Lymphatic: Does not bruise/bleed easily.   Musculoskeletal: Negative for back pain, falls, joint pain, joint swelling, muscle cramps, muscle weakness and myalgias.   Gastrointestinal: Negative for bloating, abdominal pain, change in bowel habit, nausea and vomiting.   Genitourinary: Negative for urgency.   Neurological: Negative for dizziness, focal weakness and light-headedness.   Psychiatric/Behavioral: Negative for altered mental status.        Objective:    Physical Exam   Constitutional: He is oriented to person, place, and time. He appears well-developed and well-nourished. He is cooperative.   HENT:   Head: Normocephalic and atraumatic.   Eyes: Conjunctivae are normal. Right eye exhibits no exudate. Left eye exhibits no exudate.   Neck: Normal range of motion. Neck supple. Normal carotid pulses and no JVD present. Carotid bruit is not present. No thyromegaly present.   Cardiovascular: Normal rate, regular rhythm, normal heart sounds and intact distal pulses.    Pulses:       Carotid pulses are 2+ on the right side, and 2+ on the left side.       Radial pulses are 2+ on the  right side, and 2+ on the left side.        Dorsalis pedis pulses are 2+ on the right side, and 2+ on the left side.        Posterior tibial pulses are 2+ on the right side, and 2+ on the left side.   Pulmonary/Chest: Effort normal and breath sounds normal.   Abdominal: Soft. Bowel sounds are normal.   Musculoskeletal: Normal range of motion. He exhibits no edema.   Neurological: He is alert and oriented to person, place, and time. Gait normal.   Skin: Skin is warm, dry and intact. No cyanosis. Nails show no clubbing.   Psychiatric: He has a normal mood and affect. His speech is normal and behavior is normal. Judgment and thought content normal.   Nursing note and vitals reviewed.            ..    Chemistry        Component Value Date/Time     08/19/2017 0737    K 4.5 08/19/2017 0737     08/19/2017 0737    CO2 27 08/19/2017 0737    BUN 19 08/19/2017 0737    CREATININE 1.0 08/19/2017 0737    GLU 96 08/19/2017 0737        Component Value Date/Time    CALCIUM 9.7 08/19/2017 0737    ALKPHOS 57 08/19/2017 0737    AST 19 08/19/2017 0737    ALT 23 08/19/2017 0737    BILITOT 0.5 08/19/2017 0737    ESTGFRAFRICA >60.0 08/19/2017 0737    EGFRNONAA >60.0 08/19/2017 0737            ..  Lab Results   Component Value Date    CHOL 140 08/19/2017    CHOL 113 (L) 02/18/2017    CHOL 124 11/05/2016     Lab Results   Component Value Date    HDL 36 (L) 08/19/2017    HDL 34 (L) 02/18/2017    HDL 32 (L) 11/05/2016     Lab Results   Component Value Date    LDLCALC 76.4 08/19/2017    LDLCALC 60.2 (L) 02/18/2017    LDLCALC 68.0 11/05/2016     Lab Results   Component Value Date    TRIG 138 08/19/2017    TRIG 94 02/18/2017    TRIG 120 11/05/2016     Lab Results   Component Value Date    CHOLHDL 25.7 08/19/2017    CHOLHDL 30.1 02/18/2017    CHOLHDL 25.8 11/05/2016     ..  Lab Results   Component Value Date    WBC 8.13 02/18/2017    HGB 14.0 02/18/2017    HCT 43.4 02/18/2017    MCV 92 02/18/2017     02/18/2017       Test(s)  Reviewed  I have reviewed the following in detail:  [] Stress test   [] Angiography   [] Echocardiogram   [x] Labs   [x] Other:       Assessment:         ICD-10-CM ICD-9-CM   1. History of PTCA     (7/12 RCA BMS 2.75x12) Z98.61 V45.82   2. Coronary artery disease involving native coronary artery of native heart without angina pectoris I25.10 414.01   3. Essential hypertension I10 401.9   4. Mixed hyperlipidemia E78.2 272.2   5. Family history of ischemic heart disease (IHD) Z82.49 V17.3     Problem List Items Addressed This Visit     CAD (coronary artery disease)    Essential hypertension (Chronic)    Family history of ischemic heart disease (IHD)    History of PTCA     (7/12 RCA BMS 2.75x12) - Primary (Chronic)    Overview     LAD/CX- LI. RCA- occluded         HLD (hyperlipidemia)      Other Visit Diagnoses    None.          Plan:         Return to clinic 1 year   Aerobic exercise 5x's/wk. Heart healthy diet and risk factor modification.    See labs and med orders.  Decrease diovan

## 2017-08-24 ENCOUNTER — TELEPHONE (OUTPATIENT)
Dept: CARDIOLOGY | Facility: CLINIC | Age: 52
End: 2017-08-24

## 2017-08-24 NOTE — TELEPHONE ENCOUNTER
----- Message from Camila Taniya sent at 8/24/2017 10:59 AM CDT -----  Contact: Shonda  Patient's wife is calling to ask if has an alternative to Rx Ezetimibe as cost is $70 and will not cover name brand although it is cheaper.     [facility] [place] - [address] [address] [address]  [address] [address] [address]  [place] [address]  Phone: [phone] Fax: [phone]    Please call [phone]. Thanks!

## 2017-11-10 ENCOUNTER — TELEPHONE (OUTPATIENT)
Dept: GASTROENTEROLOGY | Facility: CLINIC | Age: 52
End: 2017-11-10

## 2017-11-10 NOTE — TELEPHONE ENCOUNTER
Hi,     Pt has been scheduled for colon on 12/11 with   Dr. Viigl. Pt will need to hold plavix 5 days prior. Please advise.     Thank you,  Jojo

## 2017-11-10 NOTE — TELEPHONE ENCOUNTER
----- Message from Amy Cantrell sent at 11/10/2017  7:51 AM CST -----  Contact: wife, yahaira   Needs to schedule colon   Call back

## 2017-12-11 ENCOUNTER — ANESTHESIA EVENT (OUTPATIENT)
Dept: ENDOSCOPY | Facility: HOSPITAL | Age: 52
End: 2017-12-11
Payer: COMMERCIAL

## 2017-12-11 ENCOUNTER — ANESTHESIA (OUTPATIENT)
Dept: ENDOSCOPY | Facility: HOSPITAL | Age: 52
End: 2017-12-11
Payer: COMMERCIAL

## 2017-12-11 ENCOUNTER — HOSPITAL ENCOUNTER (OUTPATIENT)
Facility: HOSPITAL | Age: 52
Discharge: HOME | End: 2017-12-11
Attending: INTERNAL MEDICINE | Admitting: INTERNAL MEDICINE
Payer: COMMERCIAL

## 2017-12-11 ENCOUNTER — SURGERY (OUTPATIENT)
Age: 52
End: 2017-12-11

## 2017-12-11 VITALS
RESPIRATION RATE: 16 BRPM | WEIGHT: 195 LBS | BODY MASS INDEX: 31.34 KG/M2 | TEMPERATURE: 98 F | HEART RATE: 66 BPM | DIASTOLIC BLOOD PRESSURE: 60 MMHG | OXYGEN SATURATION: 98 % | HEIGHT: 66 IN | SYSTOLIC BLOOD PRESSURE: 110 MMHG

## 2017-12-11 DIAGNOSIS — Z12.11 COLON CANCER SCREENING: ICD-10-CM

## 2017-12-11 PROCEDURE — 63600175 PHARM REV CODE 636 W HCPCS: Mod: PO | Performed by: NURSE ANESTHETIST, CERTIFIED REGISTERED

## 2017-12-11 PROCEDURE — 37000008 HC ANESTHESIA 1ST 15 MINUTES: Mod: PO | Performed by: INTERNAL MEDICINE

## 2017-12-11 PROCEDURE — D9220A PRA ANESTHESIA: Mod: 33,ANES,, | Performed by: ANESTHESIOLOGY

## 2017-12-11 PROCEDURE — G0121 COLON CA SCRN NOT HI RSK IND: HCPCS | Mod: PO | Performed by: INTERNAL MEDICINE

## 2017-12-11 PROCEDURE — D9220A PRA ANESTHESIA: Mod: 33,CRNA,, | Performed by: NURSE ANESTHETIST, CERTIFIED REGISTERED

## 2017-12-11 PROCEDURE — G0121 COLON CA SCRN NOT HI RSK IND: HCPCS | Mod: ,,, | Performed by: INTERNAL MEDICINE

## 2017-12-11 PROCEDURE — 37000009 HC ANESTHESIA EA ADD 15 MINS: Mod: PO | Performed by: INTERNAL MEDICINE

## 2017-12-11 RX ORDER — LIDOCAINE HCL/PF 100 MG/5ML
SYRINGE (ML) INTRAVENOUS
Status: DISCONTINUED | OUTPATIENT
Start: 2017-12-11 | End: 2017-12-11

## 2017-12-11 RX ORDER — PROPOFOL 10 MG/ML
VIAL (ML) INTRAVENOUS
Status: DISCONTINUED | OUTPATIENT
Start: 2017-12-11 | End: 2017-12-11

## 2017-12-11 RX ORDER — SODIUM CHLORIDE, SODIUM LACTATE, POTASSIUM CHLORIDE, CALCIUM CHLORIDE 600; 310; 30; 20 MG/100ML; MG/100ML; MG/100ML; MG/100ML
INJECTION, SOLUTION INTRAVENOUS CONTINUOUS
Status: DISCONTINUED | OUTPATIENT
Start: 2017-12-11 | End: 2017-12-11 | Stop reason: HOSPADM

## 2017-12-11 RX ADMIN — LIDOCAINE HYDROCHLORIDE 100 MG: 20 INJECTION, SOLUTION INTRAVENOUS at 09:12

## 2017-12-11 RX ADMIN — SODIUM CHLORIDE, SODIUM LACTATE, POTASSIUM CHLORIDE, CALCIUM CHLORIDE: 600; 310; 30; 20 INJECTION, SOLUTION INTRAVENOUS at 09:12

## 2017-12-11 RX ADMIN — PROPOFOL 30 MG: 10 INJECTION, EMULSION INTRAVENOUS at 09:12

## 2017-12-11 NOTE — ANESTHESIA PREPROCEDURE EVALUATION
12/11/2017  Rafael Gautam Jr. is a 52 y.o., male.    Anesthesia Evaluation    I have reviewed the Patient Summary Reports.    I have reviewed the Nursing Notes.   I have reviewed the Medications.     Review of Systems  Anesthesia Hx:  No problems with previous Anesthesia    Social:  Non-Smoker    Hematology/Oncology:     Oncology Normal    -- Anemia:   EENT/Dental:EENT/Dental Normal   Cardiovascular:   Hypertension CAD  CABG/stent  hyperlipidemia    Pulmonary:  Pulmonary Normal    Renal/:  Renal/ Normal     Hepatic/GI:   Bowel Prep.    Musculoskeletal:  Musculoskeletal Normal    Neurological:   CVA    Endocrine:  Endocrine Normal    Dermatological:  Skin Normal    Psych:  Psychiatric Normal           Physical Exam  General:  Obesity    Airway/Jaw/Neck:  Airway Findings: Mouth Opening: Normal Tongue: Normal  General Airway Assessment: Adult  Mallampati: II  TM Distance: Normal, at least 6 cm        Eyes/Ears/Nose:  EYES/EARS/NOSE FINDINGS: Normal   Dental:  DENTAL FINDINGS: Normal   Chest/Lungs:  Chest/Lungs Findings: Clear to auscultation, Normal Respiratory Rate     Heart/Vascular:  Heart Findings: Rate: Normal  Rhythm: Regular Rhythm  Sounds: Normal        Mental Status:  Mental Status Findings: Normal        Anesthesia Plan  Type of Anesthesia, risks & benefits discussed:  Anesthesia Type:  general  Patient's Preference:   Intra-op Monitoring Plan: standard ASA monitors  Intra-op Monitoring Plan Comments:   Post Op Pain Control Plan:   Post Op Pain Control Plan Comments:   Induction:   IV  Beta Blocker:  Patient is not currently on a Beta-Blocker (No further documentation required).       Informed Consent: Patient understands risks and agrees with Anesthesia plan.  Questions answered. Anesthesia consent signed with patient.  ASA Score: 3     Day of Surgery Review of History & Physical: I have  interviewed and examined the patient. I have reviewed the patient's H&P dated:  There are no significant changes.  H&P update referred to the provider.         Ready For Surgery From Anesthesia Perspective.

## 2017-12-11 NOTE — H&P
History & Physical - Short Stay  Gastroenterology      SUBJECTIVE:     Procedure: Colonoscopy    Chief Complaint/Indication for Procedure: Screening    PTA Medications   Medication Sig    allopurinol (ZYLOPRIM) 300 MG tablet TAKE 1 TABLET BY MOUTH ONCE A DAY    amlodipine (NORVASC) 2.5 MG tablet Take 1 tablet (2.5 mg total) by mouth every evening.    b complex vitamins capsule Take 1 capsule by mouth once daily.    ezetimibe (ZETIA) 10 mg tablet Take 1 tablet (10 mg total) by mouth once daily.    folic acid (FOLVITE) 400 MCG tablet Take 1 tablet (400 mcg total) by mouth once daily.    rosuvastatin (CRESTOR) 10 MG tablet Take 1 tablet (10 mg total) by mouth every evening.    valsartan (DIOVAN) 80 MG tablet Take 1 tablet (160 mg total) by mouth every morning.    aspirin 81 mg Tab Take 81 mg by mouth once daily.      clopidogrel (PLAVIX) 75 mg tablet Take 1 tablet (75 mg total) by mouth once daily.    nitroGLYCERIN (NITROSTAT) 0.4 MG SL tablet Place 1 tablet (0.4 mg total) under the tongue every 5 (five) minutes as needed.       Review of patient's allergies indicates:   Allergen Reactions    Codeine Other (See Comments)     Other reaction(s): Shortness of breath  Other reaction(s): Flatulence  Other reaction(s): Difficulty breathing  fainting    Niacin      Other reaction(s): Rash  Other reaction(s): Flushing (skin)    Penicillins      Other reaction(s): Itching  Other reaction(s): Hives  Other reaction(s): Difficulty breathing        Past Medical History:   Diagnosis Date    Anemia     B12 deficiency anemia     CAD (coronary artery disease)     Carotid atherosclerosis     Hemorrhoid     HLD (hyperlipidemia)     Hypertension     Hyperuricemia     right foot and left wrist    Stroke 1/2012     Past Surgical History:   Procedure Laterality Date    CORONARY ANGIOPLASTY WITH STENT PLACEMENT      7/2012     Family History   Problem Relation Age of Onset    Hypertension Mother     Clotting disorder  Father     Hypertension Father     Hypertension Sister     Diabetes Maternal Grandmother     Stroke Maternal Grandmother     Hypertension Paternal Aunt     Stroke Paternal Aunt     Hypertension Paternal Uncle     Stroke Paternal Uncle      Social History   Substance Use Topics    Smoking status: Never Smoker    Smokeless tobacco: Former User     Quit date: 2/24/2006    Alcohol use No      Comment: quit 15 years ago         OBJECTIVE:     Vital Signs (Most Recent)       Physical Exam                                                        GENERAL:  Comfortable, in no acute distress.                                 HEENT EXAM:  Nonicteric.  No adenopathy.  Oropharynx is clear.               NECK:  Supple.                                                               LUNGS:  Clear.                                                               CARDIAC:  Regular rate and rhythm.  S1, S2.  No murmur.                      ABDOMEN:  Soft, positive bowel sounds, nontender.  No hepatosplenomegaly or masses.  No rebound or guarding.                                             EXTREMITIES:  No edema.     MENTAL STATUS:  Normal, alert and oriented.      ASSESSMENT/PLAN:     Assessment: Colorectal cancer screening    Plan: Colonoscopy    Anesthesia Plan: General    ASA Grade: ASA 2 - Patient with mild systemic disease with no functional limitations    MALLAMPATI SCORE:  I (soft palate, uvula, fauces, and tonsillar pillars visible)

## 2017-12-11 NOTE — ANESTHESIA POSTPROCEDURE EVALUATION
"Anesthesia Post Evaluation    Patient: Rafael Gautam JrAshley    Procedure(s) Performed: Procedure(s) (LRB):  COLONOSCOPY (N/A)    Final Anesthesia Type: general  Patient location during evaluation: PACU  Patient participation: Yes- Able to Participate  Level of consciousness: awake and alert  Post-procedure vital signs: reviewed and stable  Pain management: adequate  Airway patency: patent  PONV status at discharge: No PONV  Anesthetic complications: no      Cardiovascular status: hemodynamically stable  Respiratory status: unassisted and room air  Hydration status: euvolemic  Follow-up not needed.        Visit Vitals  /60 (BP Location: Left arm, Patient Position: Lying)   Pulse 66   Temp 36.4 °C (97.5 °F) (Skin)   Resp 16   Ht 5' 6" (1.676 m)   Wt 88.5 kg (195 lb)   SpO2 98%   BMI 31.47 kg/m²       Pain/Milton Score: Pain Assessment Performed: Yes (12/11/2017 10:28 AM)  Presence of Pain: denies (12/11/2017 10:28 AM)  Milton Score: 10 (12/11/2017 10:28 AM)      "

## 2017-12-11 NOTE — TRANSFER OF CARE
"Anesthesia Transfer of Care Note    Patient: Rafael Gautam Jr.    Procedure(s) Performed: Procedure(s) (LRB):  COLONOSCOPY (N/A)    Patient location: PACU    Anesthesia Type: general    Transport from OR: Transported from OR on room air with adequate spontaneous ventilation    Post pain: adequate analgesia    Post assessment: no apparent anesthetic complications and tolerated procedure well    Post vital signs: stable    Level of consciousness: awake and alert    Nausea/Vomiting: no nausea/vomiting    Complications: none    Transfer of care protocol was followed      Last vitals:   Visit Vitals  /77 (BP Location: Right arm, Patient Position: Lying)   Pulse 62   Temp 36.4 °C (97.5 °F) (Skin)   Resp 12   Ht 5' 6" (1.676 m)   Wt 88.5 kg (195 lb)   SpO2 98%   BMI 31.47 kg/m²     "

## 2017-12-11 NOTE — DISCHARGE SUMMARY
Discharge Note  Short Stay      SUMMARY     Admit Date: 12/11/2017    Attending Physician: Lemuel Vigil MD     Discharge Physician: Lemuel Vigil MD    Discharge Date: 12/11/2017 9:58 AM    Final Diagnosis: Screen for colon cancer [Z12.11]    Disposition: HOME OR SELF CARE    Patient Instructions:   Current Discharge Medication List      CONTINUE these medications which have NOT CHANGED    Details   allopurinol (ZYLOPRIM) 300 MG tablet TAKE 1 TABLET BY MOUTH ONCE A DAY  Qty: 90 tablet, Refills: 1    Comments: This prescription was filled on 7/5/2017. Any refills authorized will be placed on file.      amlodipine (NORVASC) 2.5 MG tablet Take 1 tablet (2.5 mg total) by mouth every evening.  Qty: 90 tablet, Refills: 3      b complex vitamins capsule Take 1 capsule by mouth once daily.  Qty: 90 capsule, Refills: 3      ezetimibe (ZETIA) 10 mg tablet Take 1 tablet (10 mg total) by mouth once daily.  Qty: 90 tablet, Refills: 3      folic acid (FOLVITE) 400 MCG tablet Take 1 tablet (400 mcg total) by mouth once daily.  Qty: 90 tablet, Refills: 3      rosuvastatin (CRESTOR) 10 MG tablet Take 1 tablet (10 mg total) by mouth every evening.  Qty: 90 tablet, Refills: 1    Comments: This prescription was filled on 7/5/2017. Any refills authorized will be placed on file.      valsartan (DIOVAN) 80 MG tablet Take 1 tablet (160 mg total) by mouth every morning.  Qty: 90 tablet, Refills: 5      aspirin 81 mg Tab Take 81 mg by mouth once daily.        clopidogrel (PLAVIX) 75 mg tablet Take 1 tablet (75 mg total) by mouth once daily.  Qty: 90 tablet, Refills: 3      nitroGLYCERIN (NITROSTAT) 0.4 MG SL tablet Place 1 tablet (0.4 mg total) under the tongue every 5 (five) minutes as needed.  Qty: 25 tablet, Refills: 6    Associated Diagnoses: History of PTCA; Hyperlipidemia, unspecified hyperlipidemia type; Coronary artery disease involving native coronary artery of native heart without angina pectoris; Cerebral infarction  due to occlusion of middle cerebral artery; Essential hypertension; Family history of ischemic heart disease (IHD)             Discharge Procedure Orders (must include Diet, Follow-up, Activity)    Follow Up:  Follow up with PCP as previously scheduled  Resume routine diet.  Activity as tolerated.    No driving day of procedure.

## 2017-12-11 NOTE — DISCHARGE INSTRUCTIONS
Recovery After Procedural Sedation (Adult)  You have been given medicine by vein to make you sleep during your surgery. This may have included both a pain medicine and sleeping medicine. Most of the effects have worn off. But you may still have some drowsiness for the next 6 to 8 hours.  Home care  Follow these guidelines when you get home:  · For the next 8 hours, you should be watched by a responsible adult. This person should make sure your condition is not getting worse.  · Don't drink any alcohol for the next 24 hours.  · Don't drive, operate dangerous machinery, or make important business or personal decisions during the next 24 hours.  Note: Your healthcare provider may tell you not to take any medicine by mouth for pain or sleep in the next 4 hours. These medicines may react with the medicines you were given in the hospital. This could cause a much stronger response than usual.  Follow-up care  Follow up with your healthcare provider if you are not alert and back to your usual level of activity within 12 hours.  When to seek medical advice  Call your healthcare provider right away if any of these occur:  · Drowsiness gets worse  · Weakness or dizziness gets worse  · Repeated vomiting  · You can't be awakened   Date Last Reviewed: 10/18/2016  © 0303-8607 [employer]. [address] [place] [address], [place] [address]. All rights reserved. This information is not intended as a substitute for professional medical care. Always follow your healthcare professional's instructions.

## 2018-01-02 RX ORDER — ALLOPURINOL 300 MG/1
TABLET ORAL
Qty: 90 TABLET | Refills: 2 | Status: SHIPPED | OUTPATIENT
Start: 2018-01-02 | End: 2018-08-21 | Stop reason: SDUPTHER

## 2018-01-29 ENCOUNTER — TELEPHONE (OUTPATIENT)
Dept: FAMILY MEDICINE | Facility: CLINIC | Age: 53
End: 2018-01-29

## 2018-01-29 NOTE — TELEPHONE ENCOUNTER
----- Message from Jackie Padilla sent at 1/29/2018  1:02 PM CST -----  Call [phone] / Evonne Ramirez. Pt has an appt on 02/21 st asking for labs prior to appointment

## 2018-02-21 ENCOUNTER — OFFICE VISIT (OUTPATIENT)
Dept: FAMILY MEDICINE | Facility: CLINIC | Age: 53
End: 2018-02-21
Payer: COMMERCIAL

## 2018-02-21 VITALS
HEART RATE: 67 BPM | SYSTOLIC BLOOD PRESSURE: 122 MMHG | WEIGHT: 202.38 LBS | BODY MASS INDEX: 32.53 KG/M2 | DIASTOLIC BLOOD PRESSURE: 82 MMHG | OXYGEN SATURATION: 97 % | HEIGHT: 66 IN | RESPIRATION RATE: 18 BRPM

## 2018-02-21 DIAGNOSIS — E78.2 MIXED HYPERLIPIDEMIA: Primary | ICD-10-CM

## 2018-02-21 DIAGNOSIS — I25.10 CORONARY ARTERY DISEASE INVOLVING NATIVE CORONARY ARTERY OF NATIVE HEART WITHOUT ANGINA PECTORIS: ICD-10-CM

## 2018-02-21 DIAGNOSIS — M1A.9XX0 CHRONIC GOUT WITHOUT TOPHUS, UNSPECIFIED CAUSE, UNSPECIFIED SITE: ICD-10-CM

## 2018-02-21 DIAGNOSIS — I10 ESSENTIAL HYPERTENSION: ICD-10-CM

## 2018-02-21 DIAGNOSIS — E79.0 HYPERURICEMIA: ICD-10-CM

## 2018-02-21 DIAGNOSIS — Z12.5 PROSTATE CANCER SCREENING: ICD-10-CM

## 2018-02-21 PROCEDURE — 90686 IIV4 VACC NO PRSV 0.5 ML IM: CPT | Mod: S$GLB,,, | Performed by: FAMILY MEDICINE

## 2018-02-21 PROCEDURE — 3008F BODY MASS INDEX DOCD: CPT | Mod: S$GLB,,, | Performed by: FAMILY MEDICINE

## 2018-02-21 PROCEDURE — 90471 IMMUNIZATION ADMIN: CPT | Mod: S$GLB,,, | Performed by: FAMILY MEDICINE

## 2018-02-21 PROCEDURE — 99999 PR PBB SHADOW E&M-EST. PATIENT-LVL III: CPT | Mod: PBBFAC,,, | Performed by: FAMILY MEDICINE

## 2018-02-21 PROCEDURE — 99214 OFFICE O/P EST MOD 30 MIN: CPT | Mod: 25,S$GLB,, | Performed by: FAMILY MEDICINE

## 2018-02-21 NOTE — PROGRESS NOTES
Subjective:       Patient ID: Rafael Gautam Jr. is a 52 y.o. male.    Chief Complaint: Follow-up (6 mos)    HPI Comments: Here today for 6 month f/u on chronic health issues  He reports overall feeling well.  HTN - tolerating Diovan 80mg and amlodipine 2.5mg daily; home /70  HLD - tolerating Crestor 10mg and Zetia 10mg daily  CAD s/p cardiac stents - tolerating Aspirin 81mg, Plavix 75mg daily  Gout - uses indomethacin PRN flares; no flares in the last 6 months; He is taking allopurinol 300mg daily  b12 deficiency - tolerating oral B12     Past Medical History:   Diagnosis Date    Anemia     B12 deficiency anemia     CAD (coronary artery disease)     Carotid atherosclerosis     Hemorrhoid     HLD (hyperlipidemia)     Hypertension     Hyperuricemia     right foot and left wrist    Stroke 1/2012     Current Outpatient Prescriptions on File Prior to Visit   Medication Sig Dispense Refill    allopurinol (ZYLOPRIM) 300 MG tablet TAKE ONE TABLET BY MOUTH ONCE A DAY 90 tablet 2    amlodipine (NORVASC) 2.5 MG tablet Take 1 tablet (2.5 mg total) by mouth every evening. 90 tablet 3    aspirin 81 mg Tab Take 81 mg by mouth once daily.        b complex vitamins capsule Take 1 capsule by mouth once daily. 90 capsule 3    clopidogrel (PLAVIX) 75 mg tablet Take 1 tablet (75 mg total) by mouth once daily. 90 tablet 3    ezetimibe (ZETIA) 10 mg tablet Take 1 tablet (10 mg total) by mouth once daily. 90 tablet 3    folic acid (FOLVITE) 400 MCG tablet Take 1 tablet (400 mcg total) by mouth once daily. 90 tablet 3    nitroGLYCERIN (NITROSTAT) 0.4 MG SL tablet Place 1 tablet (0.4 mg total) under the tongue every 5 (five) minutes as needed. 25 tablet 6    rosuvastatin (CRESTOR) 10 MG tablet Take 1 tablet (10 mg total) by mouth every evening. 90 tablet 1    valsartan (DIOVAN) 80 MG tablet Take 1 tablet (160 mg total) by mouth every morning. 90 tablet 5     No current facility-administered medications on file  "prior to visit.        Review of Systems   Constitutional: Negative for fever, appetite change and unexpected weight change.   HENT: Negative for ear pain, nosebleeds, congestion, sore throat, rhinorrhea, trouble swallowing, neck pain, postnasal drip, sinus pressure and ear discharge.    Eyes: Negative for pain, discharge, redness and visual disturbance.   Respiratory: Negative for apnea, cough, chest tightness, shortness of breath and wheezing.    Cardiovascular: Negative for chest pain, palpitations and leg swelling.   Gastrointestinal: Negative for nausea, vomiting, abdominal pain, diarrhea, constipation, blood in stool and abdominal distention.   Genitourinary: Negative for dysuria, urgency, frequency, hematuria, flank pain, scrotal swelling, difficulty urinating and testicular pain.   Skin: Negative for color change, rash and wound.   Neurological: Negative for dizziness, tremors, seizures, syncope, speech difficulty, weakness, light-headedness and numbness.   Hematological: Negative for adenopathy. Does not bruise/bleed easily.   Psychiatric/Behavioral: Negative for suicidal ideas, hallucinations, behavioral problems and confusion.   Intermittent right knee pains      Objective:      /82   Pulse 67   Resp 18   Ht 5' 6" (1.676 m)   Wt 91.8 kg (202 lb 6.1 oz)   SpO2 97%   BMI 32.67 kg/m²   Physical Exam   Constitutional: He is oriented to person, place, and time. He appears well-developed and well-nourished. He is active and cooperative.   Neck: Trachea normal, normal range of motion and full passive range of motion without pain. Neck supple. Normal carotid pulses and no JVD present. Carotid bruit is not present. No tracheal deviation present. No mass and no thyromegaly present.   Cardiovascular: Normal rate, regular rhythm, S1 normal, S2 normal, normal heart sounds and intact distal pulses.  Exam reveals no gallop and no friction rub.    No murmur heard.  Pulmonary/Chest: Effort normal and breath " sounds normal. No respiratory distress. He has no wheezes. He has no rales.   Abdominal: Soft. Normal appearance and bowel sounds are normal. He exhibits no distension and no mass. There is no hepatosplenomegaly. There is no tenderness. There is no rebound and no guarding.   Musculoskeletal: Normal range of motion. He exhibits no edema and no tenderness.   Neurological: He is alert and oriented to person, place, and time. He has normal strength. No cranial nerve deficit. Coordination normal.   Skin: Skin is warm, dry and intact. No rash noted. No cyanosis or erythema. No pallor. Nails show no clubbing.   Psychiatric: He has a normal mood and affect. His speech is normal and behavior is normal. Judgment and thought content normal.       Results for orders placed or performed in visit on 08/19/17   Comprehensive metabolic panel   Result Value Ref Range    Sodium 139 136 - 145 mmol/L    Potassium 4.5 3.5 - 5.1 mmol/L    Chloride 104 95 - 110 mmol/L    CO2 27 23 - 29 mmol/L    Glucose 96 70 - 110 mg/dL    BUN, Bld 19 6 - 20 mg/dL    Creatinine 1.0 0.5 - 1.4 mg/dL    Calcium 9.7 8.7 - 10.5 mg/dL    Total Protein 7.2 6.0 - 8.4 g/dL    Albumin 4.1 3.5 - 5.2 g/dL    Total Bilirubin 0.5 0.1 - 1.0 mg/dL    Alkaline Phosphatase 57 55 - 135 U/L    AST 19 10 - 40 U/L    ALT 23 10 - 44 U/L    Anion Gap 8 8 - 16 mmol/L    eGFR if African American >60.0 >60 mL/min/1.73 m^2    eGFR if non African American >60.0 >60 mL/min/1.73 m^2   Lipid panel   Result Value Ref Range    Cholesterol 140 120 - 199 mg/dL    Triglycerides 138 30 - 150 mg/dL    HDL 36 (L) 40 - 75 mg/dL    LDL Cholesterol 76.4 63.0 - 159.0 mg/dL    HDL/Chol Ratio 25.7 20.0 - 50.0 %    Total Cholesterol/HDL Ratio 3.9 2.0 - 5.0    Non-HDL Cholesterol 104 mg/dL   PSA, Screening   Result Value Ref Range    PSA, SCREEN 0.57 0.00 - 4.00 ng/mL   Uric acid   Result Value Ref Range    Uric Acid 6.6 3.4 - 7.0 mg/dL       Assessment:       1. Mixed hyperlipidemia    2. Essential  hypertension    3. Coronary artery disease involving native coronary artery of native heart without angina pectoris    4. Chronic gout without tophus, unspecified cause, unspecified site    5. Hyperuricemia    6. Prostate cancer screening        Plan:       Mixed hyperlipidemia  -     Comprehensive metabolic panel; Future; Expected date: 08/20/2018  -     Lipid panel; Future; Expected date: 08/20/2018    Essential hypertension  -     CBC auto differential; Future; Expected date: 08/20/2018    Coronary artery disease involving native coronary artery of native heart without angina pectoris    Chronic gout without tophus, unspecified cause, unspecified site  -     Uric acid; Future; Expected date: 08/20/2018    Hyperuricemia    Prostate cancer screening  -     PSA, Screening; Future; Expected date: 08/20/2018      Hypertension (controlled)  Continue present meds    Hld (hyperlipidemia)  Continue Zetia and Crestor    Cad (coronary artery disease)  Continue f/u with cardiology    Gout, chronic  Continue indomethacin PRN  Continue allopurinol daily    B12 deficiency anemia  Continue oral B12 1,000mcg daily    Counseled on regular exercise, maintenance of a healthy weight, balanced diet rich in fruits/vegetables and lean protein, and avoidance of unhealthy habits like smoking and excessive alcohol intake.  Goal weight of 190    Flu shot  F/u 6 months with labs

## 2018-07-19 NOTE — TELEPHONE ENCOUNTER
----- Message from Liliana Raimrez sent at 7/19/2018  1:26 PM CDT -----  Type:  RX Refill Request    Who Called:  Evonne - spouse  Refill or New Rx:  refill  RX Name and Strength:  rosuvastatin (CRESTOR) 10 MG tablet  How is the patient currently taking it? (ex. 1XDay):  1 x day  Is this a 30 day or 90 day RX:  90 day  Preferred Pharmacy with phone number:    [facility] [place] - [address]  [address] [address] [facility] [address]  [place] [address]  Phone: [phone] Fax: [phone]  Local or Mail Order:  local  Ordering Provider:  Clem Ivory Call Back Number:  [phone]  Additional Information:

## 2018-07-20 RX ORDER — ROSUVASTATIN CALCIUM 10 MG/1
10 TABLET, COATED ORAL NIGHTLY
Qty: 90 TABLET | Refills: 4 | Status: SHIPPED | OUTPATIENT
Start: 2018-07-20 | End: 2018-08-21 | Stop reason: SDUPTHER

## 2018-08-11 ENCOUNTER — LAB VISIT (OUTPATIENT)
Dept: LAB | Facility: HOSPITAL | Age: 53
End: 2018-08-11
Attending: FAMILY MEDICINE
Payer: COMMERCIAL

## 2018-08-11 DIAGNOSIS — E78.2 MIXED HYPERLIPIDEMIA: ICD-10-CM

## 2018-08-11 DIAGNOSIS — I10 ESSENTIAL HYPERTENSION: ICD-10-CM

## 2018-08-11 DIAGNOSIS — M1A.9XX0 CHRONIC GOUT WITHOUT TOPHUS, UNSPECIFIED CAUSE, UNSPECIFIED SITE: ICD-10-CM

## 2018-08-11 DIAGNOSIS — Z12.5 PROSTATE CANCER SCREENING: ICD-10-CM

## 2018-08-11 LAB
ALBUMIN SERPL BCP-MCNC: 4 G/DL
ALP SERPL-CCNC: 60 U/L
ALT SERPL W/O P-5'-P-CCNC: 27 U/L
ANION GAP SERPL CALC-SCNC: 9 MMOL/L
AST SERPL-CCNC: 23 U/L
BASOPHILS # BLD AUTO: 0.03 K/UL
BASOPHILS NFR BLD: 0.4 %
BILIRUB SERPL-MCNC: 0.5 MG/DL
BUN SERPL-MCNC: 12 MG/DL
CALCIUM SERPL-MCNC: 9.5 MG/DL
CHLORIDE SERPL-SCNC: 107 MMOL/L
CHOLEST SERPL-MCNC: 137 MG/DL
CHOLEST/HDLC SERPL: 3.8 {RATIO}
CO2 SERPL-SCNC: 27 MMOL/L
COMPLEXED PSA SERPL-MCNC: 0.56 NG/ML
CREAT SERPL-MCNC: 0.9 MG/DL
DIFFERENTIAL METHOD: NORMAL
EOSINOPHIL # BLD AUTO: 0.2 K/UL
EOSINOPHIL NFR BLD: 3 %
ERYTHROCYTE [DISTWIDTH] IN BLOOD BY AUTOMATED COUNT: 12.8 %
EST. GFR  (AFRICAN AMERICAN): >60 ML/MIN/1.73 M^2
EST. GFR  (NON AFRICAN AMERICAN): >60 ML/MIN/1.73 M^2
GLUCOSE SERPL-MCNC: 97 MG/DL
HCT VFR BLD AUTO: 42.8 %
HDLC SERPL-MCNC: 36 MG/DL
HDLC SERPL: 26.3 %
HGB BLD-MCNC: 14 G/DL
IMM GRANULOCYTES # BLD AUTO: 0.02 K/UL
IMM GRANULOCYTES NFR BLD AUTO: 0.3 %
LDLC SERPL CALC-MCNC: 69.6 MG/DL
LYMPHOCYTES # BLD AUTO: 3.5 K/UL
LYMPHOCYTES NFR BLD: 44.3 %
MCH RBC QN AUTO: 30.3 PG
MCHC RBC AUTO-ENTMCNC: 32.7 G/DL
MCV RBC AUTO: 93 FL
MONOCYTES # BLD AUTO: 0.7 K/UL
MONOCYTES NFR BLD: 8.7 %
NEUTROPHILS # BLD AUTO: 3.5 K/UL
NEUTROPHILS NFR BLD: 43.3 %
NONHDLC SERPL-MCNC: 101 MG/DL
NRBC BLD-RTO: 0 /100 WBC
PLATELET # BLD AUTO: 271 K/UL
PMV BLD AUTO: 9.2 FL
POTASSIUM SERPL-SCNC: 4.4 MMOL/L
PROT SERPL-MCNC: 6.9 G/DL
RBC # BLD AUTO: 4.62 M/UL
SODIUM SERPL-SCNC: 143 MMOL/L
TRIGL SERPL-MCNC: 157 MG/DL
URATE SERPL-MCNC: 5.7 MG/DL
WBC # BLD AUTO: 7.96 K/UL

## 2018-08-11 PROCEDURE — 36415 COLL VENOUS BLD VENIPUNCTURE: CPT | Mod: PO

## 2018-08-11 PROCEDURE — 84550 ASSAY OF BLOOD/URIC ACID: CPT

## 2018-08-11 PROCEDURE — 85025 COMPLETE CBC W/AUTO DIFF WBC: CPT

## 2018-08-11 PROCEDURE — 84153 ASSAY OF PSA TOTAL: CPT

## 2018-08-11 PROCEDURE — 80053 COMPREHEN METABOLIC PANEL: CPT

## 2018-08-11 PROCEDURE — 80061 LIPID PANEL: CPT

## 2018-08-21 ENCOUNTER — OFFICE VISIT (OUTPATIENT)
Dept: FAMILY MEDICINE | Facility: CLINIC | Age: 53
End: 2018-08-21
Payer: COMMERCIAL

## 2018-08-21 VITALS
DIASTOLIC BLOOD PRESSURE: 82 MMHG | RESPIRATION RATE: 18 BRPM | HEIGHT: 66 IN | HEART RATE: 59 BPM | WEIGHT: 205.5 LBS | OXYGEN SATURATION: 97 % | SYSTOLIC BLOOD PRESSURE: 122 MMHG | BODY MASS INDEX: 33.03 KG/M2

## 2018-08-21 DIAGNOSIS — I25.10 CORONARY ARTERY DISEASE INVOLVING NATIVE CORONARY ARTERY OF NATIVE HEART WITHOUT ANGINA PECTORIS: ICD-10-CM

## 2018-08-21 DIAGNOSIS — E78.2 MIXED HYPERLIPIDEMIA: ICD-10-CM

## 2018-08-21 DIAGNOSIS — Z00.00 PREVENTATIVE HEALTH CARE: Primary | ICD-10-CM

## 2018-08-21 DIAGNOSIS — I10 ESSENTIAL HYPERTENSION: Chronic | ICD-10-CM

## 2018-08-21 DIAGNOSIS — E79.0 HYPERURICEMIA: ICD-10-CM

## 2018-08-21 PROCEDURE — 3079F DIAST BP 80-89 MM HG: CPT | Mod: CPTII,S$GLB,, | Performed by: FAMILY MEDICINE

## 2018-08-21 PROCEDURE — 99396 PREV VISIT EST AGE 40-64: CPT | Mod: S$GLB,,, | Performed by: FAMILY MEDICINE

## 2018-08-21 PROCEDURE — 3074F SYST BP LT 130 MM HG: CPT | Mod: CPTII,S$GLB,, | Performed by: FAMILY MEDICINE

## 2018-08-21 PROCEDURE — 99999 PR PBB SHADOW E&M-EST. PATIENT-LVL III: CPT | Mod: PBBFAC,,, | Performed by: FAMILY MEDICINE

## 2018-08-21 RX ORDER — AMLODIPINE BESYLATE 2.5 MG/1
2.5 TABLET ORAL NIGHTLY
Qty: 90 TABLET | Refills: 3 | Status: SHIPPED | OUTPATIENT
Start: 2018-08-21 | End: 2019-08-21 | Stop reason: SDUPTHER

## 2018-08-21 RX ORDER — ALLOPURINOL 300 MG/1
300 TABLET ORAL DAILY
Qty: 90 TABLET | Refills: 3 | Status: SHIPPED | OUTPATIENT
Start: 2018-08-21 | End: 2019-08-21 | Stop reason: SDUPTHER

## 2018-08-21 RX ORDER — CLOPIDOGREL BISULFATE 75 MG/1
75 TABLET ORAL DAILY
Qty: 90 TABLET | Refills: 3 | Status: SHIPPED | OUTPATIENT
Start: 2018-08-21 | End: 2019-08-21 | Stop reason: SDUPTHER

## 2018-08-21 RX ORDER — ROSUVASTATIN CALCIUM 10 MG/1
10 TABLET, COATED ORAL NIGHTLY
Qty: 90 TABLET | Refills: 3 | Status: SHIPPED | OUTPATIENT
Start: 2018-08-21 | End: 2019-08-21 | Stop reason: SDUPTHER

## 2018-08-21 RX ORDER — EZETIMIBE 10 MG/1
10 TABLET ORAL DAILY
Qty: 90 TABLET | Refills: 3 | Status: SHIPPED | OUTPATIENT
Start: 2018-08-21 | End: 2019-06-05 | Stop reason: SDUPTHER

## 2018-08-21 RX ORDER — VALSARTAN 80 MG/1
TABLET ORAL
Qty: 90 TABLET | Refills: 5 | Status: SHIPPED | OUTPATIENT
Start: 2018-08-21 | End: 2019-08-21 | Stop reason: SDUPTHER

## 2018-08-21 NOTE — PROGRESS NOTES
Subjective:       Patient ID: Rafael Gautam Jr. is a 53 y.o. male.    Chief Complaint: Follow-up    HPI Comments: Here today for annual exam and f/u on chronic health issues  He reports overall feeling well.  HTN - tolerating Diovan 80mg and amlodipine 2.5mg daily; home /70  HLD - tolerating Crestor 10mg and Zetia 10mg daily  CAD s/p cardiac stents - tolerating Aspirin 81mg, Plavix 75mg daily  Gout - uses indomethacin PRN flares; no flares in the last 6 months; He is taking allopurinol 300mg daily  b12 deficiency - tolerating oral B12     Past Medical History:   Diagnosis Date    Anemia     B12 deficiency anemia     CAD (coronary artery disease)     Carotid atherosclerosis     Hemorrhoid     HLD (hyperlipidemia)     Hypertension     Hyperuricemia     right foot and left wrist    Stroke 1/2012     Current Outpatient Medications on File Prior to Visit   Medication Sig Dispense Refill    aspirin 81 mg Tab Take 81 mg by mouth once daily.        b complex vitamins capsule Take 1 capsule by mouth once daily. 90 capsule 3    folic acid (FOLVITE) 400 MCG tablet Take 1 tablet (400 mcg total) by mouth once daily. 90 tablet 3    nitroGLYCERIN (NITROSTAT) 0.4 MG SL tablet Place 1 tablet (0.4 mg total) under the tongue every 5 (five) minutes as needed. 25 tablet 6    [DISCONTINUED] allopurinol (ZYLOPRIM) 300 MG tablet TAKE ONE TABLET BY MOUTH ONCE A DAY 90 tablet 2    [DISCONTINUED] amlodipine (NORVASC) 2.5 MG tablet Take 1 tablet (2.5 mg total) by mouth every evening. 90 tablet 3    [DISCONTINUED] clopidogrel (PLAVIX) 75 mg tablet Take 1 tablet (75 mg total) by mouth once daily. 90 tablet 3    [DISCONTINUED] ezetimibe (ZETIA) 10 mg tablet Take 1 tablet (10 mg total) by mouth once daily. 90 tablet 3    [DISCONTINUED] rosuvastatin (CRESTOR) 10 MG tablet Take 1 tablet (10 mg total) by mouth every evening. 90 tablet 4    [DISCONTINUED] valsartan (DIOVAN) 80 MG tablet Take 1 tablet (160 mg total) by mouth  "every morning. 90 tablet 5     No current facility-administered medications on file prior to visit.        Review of Systems   Constitutional: Negative for fever, appetite change and unexpected weight change.   HENT: Negative for ear pain, nosebleeds, congestion, sore throat, rhinorrhea, trouble swallowing, neck pain, postnasal drip, sinus pressure and ear discharge.    Eyes: Negative for pain, discharge, redness and visual disturbance.   Respiratory: Negative for apnea, cough, chest tightness, shortness of breath and wheezing.    Cardiovascular: Negative for chest pain, palpitations and leg swelling.   Gastrointestinal: Negative for nausea, vomiting, abdominal pain, diarrhea, constipation, blood in stool and abdominal distention.   Genitourinary: Negative for dysuria, urgency, frequency, hematuria, flank pain, scrotal swelling, difficulty urinating and testicular pain.   Skin: Negative for color change, rash and wound.   Neurological: Negative for dizziness, tremors, seizures, syncope, speech difficulty, weakness, light-headedness and numbness.   Hematological: Negative for adenopathy. Does not bruise/bleed easily.   Psychiatric/Behavioral: Negative for suicidal ideas, hallucinations, behavioral problems and confusion.   Intermittent right knee pains      Objective:      /82   Pulse (!) 59   Resp 18   Ht 5' 6" (1.676 m)   Wt 93.2 kg (205 lb 7.5 oz)   SpO2 97%   BMI 33.16 kg/m²   Physical Exam   Constitutional: He is oriented to person, place, and time. He appears well-developed and well-nourished. He is active and cooperative.   Neck: Trachea normal, normal range of motion and full passive range of motion without pain. Neck supple. Normal carotid pulses and no JVD present. Carotid bruit is not present. No tracheal deviation present. No mass and no thyromegaly present.   Cardiovascular: Normal rate, regular rhythm, S1 normal, S2 normal, normal heart sounds and intact distal pulses.  Exam reveals no " gallop and no friction rub.    No murmur heard.  Pulmonary/Chest: Effort normal and breath sounds normal. No respiratory distress. He has no wheezes. He has no rales.   Abdominal: Soft. Normal appearance and bowel sounds are normal. He exhibits no distension and no mass. There is no hepatosplenomegaly. There is no tenderness. There is no rebound and no guarding.   Musculoskeletal: Normal range of motion. He exhibits no edema and no tenderness.   Neurological: He is alert and oriented to person, place, and time. He has normal strength. No cranial nerve deficit. Coordination normal.   Skin: Skin is warm, dry and intact. No rash noted. No cyanosis or erythema. No pallor. Nails show no clubbing.   Psychiatric: He has a normal mood and affect. His speech is normal and behavior is normal. Judgment and thought content normal.       Results for orders placed or performed in visit on 08/11/18   Comprehensive metabolic panel   Result Value Ref Range    Sodium 143 136 - 145 mmol/L    Potassium 4.4 3.5 - 5.1 mmol/L    Chloride 107 95 - 110 mmol/L    CO2 27 23 - 29 mmol/L    Glucose 97 70 - 110 mg/dL    BUN, Bld 12 6 - 20 mg/dL    Creatinine 0.9 0.5 - 1.4 mg/dL    Calcium 9.5 8.7 - 10.5 mg/dL    Total Protein 6.9 6.0 - 8.4 g/dL    Albumin 4.0 3.5 - 5.2 g/dL    Total Bilirubin 0.5 0.1 - 1.0 mg/dL    Alkaline Phosphatase 60 55 - 135 U/L    AST 23 10 - 40 U/L    ALT 27 10 - 44 U/L    Anion Gap 9 8 - 16 mmol/L    eGFR if African American >60.0 >60 mL/min/1.73 m^2    eGFR if non African American >60.0 >60 mL/min/1.73 m^2   Lipid panel   Result Value Ref Range    Cholesterol 137 120 - 199 mg/dL    Triglycerides 157 (H) 30 - 150 mg/dL    HDL 36 (L) 40 - 75 mg/dL    LDL Cholesterol 69.6 63.0 - 159.0 mg/dL    HDL/Chol Ratio 26.3 20.0 - 50.0 %    Total Cholesterol/HDL Ratio 3.8 2.0 - 5.0    Non-HDL Cholesterol 101 mg/dL   PSA, Screening   Result Value Ref Range    PSA, SCREEN 0.56 0.00 - 4.00 ng/mL   CBC auto differential   Result Value  Ref Range    WBC 7.96 3.90 - 12.70 K/uL    RBC 4.62 4.60 - 6.20 M/uL    Hemoglobin 14.0 14.0 - 18.0 g/dL    Hematocrit 42.8 40.0 - 54.0 %    MCV 93 82 - 98 fL    MCH 30.3 27.0 - 31.0 pg    MCHC 32.7 32.0 - 36.0 g/dL    RDW 12.8 11.5 - 14.5 %    Platelets 271 150 - 350 K/uL    MPV 9.2 9.2 - 12.9 fL    Immature Granulocytes 0.3 0.0 - 0.5 %    Gran # (ANC) 3.5 1.8 - 7.7 K/uL    Immature Grans (Abs) 0.02 0.00 - 0.04 K/uL    Lymph # 3.5 1.0 - 4.8 K/uL    Mono # 0.7 0.3 - 1.0 K/uL    Eos # 0.2 0.0 - 0.5 K/uL    Baso # 0.03 0.00 - 0.20 K/uL    nRBC 0 0 /100 WBC    Gran% 43.3 38.0 - 73.0 %    Lymph% 44.3 18.0 - 48.0 %    Mono% 8.7 4.0 - 15.0 %    Eosinophil% 3.0 0.0 - 8.0 %    Basophil% 0.4 0.0 - 1.9 %    Differential Method Automated    Uric acid   Result Value Ref Range    Uric Acid 5.7 3.4 - 7.0 mg/dL       Assessment:       1. Preventative health care    2. Hyperuricemia    3. Mixed hyperlipidemia    4. Essential hypertension    5. Coronary artery disease involving native coronary artery of native heart without angina pectoris        Plan:       Preventative health care    Hyperuricemia  -     allopurinol (ZYLOPRIM) 300 MG tablet; Take 1 tablet (300 mg total) by mouth once daily.  Dispense: 90 tablet; Refill: 3    Mixed hyperlipidemia  -     rosuvastatin (CRESTOR) 10 MG tablet; Take 1 tablet (10 mg total) by mouth every evening.  Dispense: 90 tablet; Refill: 3  -     ezetimibe (ZETIA) 10 mg tablet; Take 1 tablet (10 mg total) by mouth once daily.  Dispense: 90 tablet; Refill: 3    Essential hypertension  -     valsartan (DIOVAN) 80 MG tablet; Take 1 tablet (160 mg total) by mouth every morning.  Dispense: 90 tablet; Refill: 5  -     amLODIPine (NORVASC) 2.5 MG tablet; Take 1 tablet (2.5 mg total) by mouth every evening.  Dispense: 90 tablet; Refill: 3    Coronary artery disease involving native coronary artery of native heart without angina pectoris  -     clopidogrel (PLAVIX) 75 mg tablet; Take 1 tablet (75 mg total)  by mouth once daily.  Dispense: 90 tablet; Refill: 3      Hypertension (controlled)  Continue present meds    Hld (hyperlipidemia)  Continue Zetia and Crestor    Cad (coronary artery disease)  Continue f/u with cardiology    Gout, chronic  Continue indomethacin PRN  Continue allopurinol daily    B12 deficiency anemia  Continue oral B12 1,000mcg daily    Counseled on regular exercise, maintenance of a healthy weight, balanced diet rich in fruits/vegetables and lean protein, and avoidance of unhealthy habits like smoking and excessive alcohol intake.  Goal weight of 190    F/u annually with labs

## 2018-08-28 ENCOUNTER — OFFICE VISIT (OUTPATIENT)
Dept: CARDIOLOGY | Facility: CLINIC | Age: 53
End: 2018-08-28
Payer: COMMERCIAL

## 2018-08-28 VITALS
SYSTOLIC BLOOD PRESSURE: 119 MMHG | WEIGHT: 201.75 LBS | BODY MASS INDEX: 32.42 KG/M2 | HEART RATE: 67 BPM | DIASTOLIC BLOOD PRESSURE: 72 MMHG | HEIGHT: 66 IN

## 2018-08-28 DIAGNOSIS — Z98.61 HISTORY OF PTCA: Primary | Chronic | ICD-10-CM

## 2018-08-28 DIAGNOSIS — I25.10 CORONARY ARTERY DISEASE INVOLVING NATIVE CORONARY ARTERY OF NATIVE HEART WITHOUT ANGINA PECTORIS: ICD-10-CM

## 2018-08-28 DIAGNOSIS — E78.2 MIXED HYPERLIPIDEMIA: ICD-10-CM

## 2018-08-28 DIAGNOSIS — Z82.49 FAMILY HISTORY OF ISCHEMIC HEART DISEASE (IHD): ICD-10-CM

## 2018-08-28 PROCEDURE — 3078F DIAST BP <80 MM HG: CPT | Mod: CPTII,S$GLB,, | Performed by: INTERNAL MEDICINE

## 2018-08-28 PROCEDURE — 99214 OFFICE O/P EST MOD 30 MIN: CPT | Mod: S$GLB,,, | Performed by: INTERNAL MEDICINE

## 2018-08-28 PROCEDURE — 3074F SYST BP LT 130 MM HG: CPT | Mod: CPTII,S$GLB,, | Performed by: INTERNAL MEDICINE

## 2018-08-28 PROCEDURE — 3008F BODY MASS INDEX DOCD: CPT | Mod: CPTII,S$GLB,, | Performed by: INTERNAL MEDICINE

## 2018-08-28 PROCEDURE — 99999 PR PBB SHADOW E&M-EST. PATIENT-LVL III: CPT | Mod: PBBFAC,,, | Performed by: INTERNAL MEDICINE

## 2018-08-28 NOTE — PROGRESS NOTES
Subjective:    Patient ID:  Rafael Gautam Jr. is a 53 y.o. male who presents for follow-up of Coronary Artery Disease (Review labs ) and Hyperlipidemia      HPI  Here for follow up of CAD-PCI 2012. Patients states is doing well no chest pain, SOB or change in exertional tolerence. Patient does not exercise but remains very active with out change in exertional tolerance or chest pain.         Review of Systems   Constitution: Negative for malaise/fatigue.   Eyes: Negative for blurred vision.   Cardiovascular: Negative for chest pain, claudication, cyanosis, dyspnea on exertion, irregular heartbeat, leg swelling, near-syncope, orthopnea, palpitations, paroxysmal nocturnal dyspnea and syncope.   Respiratory: Negative for cough and shortness of breath.    Hematologic/Lymphatic: Does not bruise/bleed easily.   Musculoskeletal: Negative for back pain, falls, joint pain, muscle cramps, muscle weakness and myalgias.   Gastrointestinal: Negative for abdominal pain, change in bowel habit, nausea and vomiting.   Genitourinary: Negative for urgency.   Neurological: Negative for dizziness, focal weakness and light-headedness.        Objective:            Physical Exam   Constitutional: He is oriented to person, place, and time. He appears well-developed and well-nourished. He is cooperative.   HENT:   Head: Normocephalic and atraumatic.   Eyes: Conjunctivae are normal. Right eye exhibits no exudate. Left eye exhibits no exudate.   Neck: Normal range of motion. Neck supple. Normal carotid pulses and no JVD present. Carotid bruit is not present. No thyromegaly present.   Cardiovascular: Normal rate, regular rhythm, normal heart sounds and intact distal pulses.   Pulses:       Carotid pulses are 2+ on the right side, and 2+ on the left side.       Radial pulses are 2+ on the right side, and 2+ on the left side.        Dorsalis pedis pulses are 2+ on the right side, and 2+ on the left side.        Posterior tibial pulses are 2+  on the right side, and 2+ on the left side.   Pulmonary/Chest: Effort normal and breath sounds normal.   Abdominal: Soft. Bowel sounds are normal.   Musculoskeletal: Normal range of motion. He exhibits no edema.   Neurological: He is alert and oriented to person, place, and time. Gait normal.   Skin: Skin is warm, dry and intact. No cyanosis. Nails show no clubbing.   Psychiatric: He has a normal mood and affect. His speech is normal and behavior is normal. Judgment and thought content normal.   Nursing note and vitals reviewed.      ..    Chemistry        Component Value Date/Time     08/11/2018 0744    K 4.4 08/11/2018 0744     08/11/2018 0744    CO2 27 08/11/2018 0744    BUN 12 08/11/2018 0744    CREATININE 0.9 08/11/2018 0744    GLU 97 08/11/2018 0744        Component Value Date/Time    CALCIUM 9.5 08/11/2018 0744    ALKPHOS 60 08/11/2018 0744    AST 23 08/11/2018 0744    ALT 27 08/11/2018 0744    BILITOT 0.5 08/11/2018 0744    ESTGFRAFRICA >60.0 08/11/2018 0744    EGFRNONAA >60.0 08/11/2018 0744            ..  Lab Results   Component Value Date    CHOL 137 08/11/2018    CHOL 140 08/19/2017    CHOL 113 (L) 02/18/2017     Lab Results   Component Value Date    HDL 36 (L) 08/11/2018    HDL 36 (L) 08/19/2017    HDL 34 (L) 02/18/2017     Lab Results   Component Value Date    LDLCALC 69.6 08/11/2018    LDLCALC 76.4 08/19/2017    LDLCALC 60.2 (L) 02/18/2017     Lab Results   Component Value Date    TRIG 157 (H) 08/11/2018    TRIG 138 08/19/2017    TRIG 94 02/18/2017     Lab Results   Component Value Date    CHOLHDL 26.3 08/11/2018    CHOLHDL 25.7 08/19/2017    CHOLHDL 30.1 02/18/2017     ..  Lab Results   Component Value Date    WBC 7.96 08/11/2018    HGB 14.0 08/11/2018    HCT 42.8 08/11/2018    MCV 93 08/11/2018     08/11/2018       Test(s) Reviewed  I have reviewed the following in detail:  [] Stress test   [] Angiography   [x] Echocardiogram   [x] Labs   [] Other:       Assessment:          ICD-10-CM ICD-9-CM   1. History of PTCA     (7/12 RCA BMS 2.75x12) Z98.61 V45.82   2. Family history of ischemic heart disease (IHD) Z82.49 V17.3   3. Mixed hyperlipidemia E78.2 272.2   4. Coronary artery disease involving native coronary artery of native heart without angina pectoris I25.10 414.01     Problem List Items Addressed This Visit     CAD (coronary artery disease)    Family history of ischemic heart disease (IHD)    History of PTCA     (7/12 RCA BMS 2.75x12) - Primary (Chronic)    Overview     LAD/CX- LI. RCA- occluded         HLD (hyperlipidemia)           Plan:           Return to clinic 1 year   Low level/low impact aerobic exercise 5x's/wk. Heart healthy diet and risk factor modification.    See labs and med orders.

## 2018-10-11 RX ORDER — ALLOPURINOL 300 MG/1
TABLET ORAL
Qty: 90 TABLET | Refills: 2 | OUTPATIENT
Start: 2018-10-11

## 2019-02-21 ENCOUNTER — OFFICE VISIT (OUTPATIENT)
Dept: FAMILY MEDICINE | Facility: CLINIC | Age: 54
End: 2019-02-21
Payer: COMMERCIAL

## 2019-02-21 VITALS
SYSTOLIC BLOOD PRESSURE: 110 MMHG | DIASTOLIC BLOOD PRESSURE: 86 MMHG | HEIGHT: 66 IN | OXYGEN SATURATION: 98 % | HEART RATE: 60 BPM | BODY MASS INDEX: 33.52 KG/M2 | RESPIRATION RATE: 18 BRPM | WEIGHT: 208.56 LBS

## 2019-02-21 DIAGNOSIS — E79.0 HYPERURICEMIA: ICD-10-CM

## 2019-02-21 DIAGNOSIS — Z12.5 PROSTATE CANCER SCREENING: ICD-10-CM

## 2019-02-21 DIAGNOSIS — G56.01 CARPAL TUNNEL SYNDROME OF RIGHT WRIST: ICD-10-CM

## 2019-02-21 DIAGNOSIS — I10 ESSENTIAL HYPERTENSION: ICD-10-CM

## 2019-02-21 DIAGNOSIS — E78.2 MIXED HYPERLIPIDEMIA: Primary | ICD-10-CM

## 2019-02-21 DIAGNOSIS — I25.10 CORONARY ARTERY DISEASE INVOLVING NATIVE CORONARY ARTERY OF NATIVE HEART WITHOUT ANGINA PECTORIS: ICD-10-CM

## 2019-02-21 PROCEDURE — 3074F PR MOST RECENT SYSTOLIC BLOOD PRESSURE < 130 MM HG: ICD-10-PCS | Mod: CPTII,S$GLB,, | Performed by: FAMILY MEDICINE

## 2019-02-21 PROCEDURE — 3074F SYST BP LT 130 MM HG: CPT | Mod: CPTII,S$GLB,, | Performed by: FAMILY MEDICINE

## 2019-02-21 PROCEDURE — 3008F PR BODY MASS INDEX (BMI) DOCUMENTED: ICD-10-PCS | Mod: CPTII,S$GLB,, | Performed by: FAMILY MEDICINE

## 2019-02-21 PROCEDURE — 3008F BODY MASS INDEX DOCD: CPT | Mod: CPTII,S$GLB,, | Performed by: FAMILY MEDICINE

## 2019-02-21 PROCEDURE — 90471 IMMUNIZATION ADMIN: CPT | Mod: 59,S$GLB,, | Performed by: FAMILY MEDICINE

## 2019-02-21 PROCEDURE — 3079F PR MOST RECENT DIASTOLIC BLOOD PRESSURE 80-89 MM HG: ICD-10-PCS | Mod: CPTII,S$GLB,, | Performed by: FAMILY MEDICINE

## 2019-02-21 PROCEDURE — 90686 IIV4 VACC NO PRSV 0.5 ML IM: CPT | Mod: S$GLB,,, | Performed by: FAMILY MEDICINE

## 2019-02-21 PROCEDURE — 3079F DIAST BP 80-89 MM HG: CPT | Mod: CPTII,S$GLB,, | Performed by: FAMILY MEDICINE

## 2019-02-21 PROCEDURE — 90471 FLU VACCINE (QUAD) GREATER THAN OR EQUAL TO 3YO PRESERVATIVE FREE IM: ICD-10-PCS | Mod: 59,S$GLB,, | Performed by: FAMILY MEDICINE

## 2019-02-21 PROCEDURE — 99214 PR OFFICE/OUTPT VISIT, EST, LEVL IV, 30-39 MIN: ICD-10-PCS | Mod: 25,S$GLB,, | Performed by: FAMILY MEDICINE

## 2019-02-21 PROCEDURE — 90686 FLU VACCINE (QUAD) GREATER THAN OR EQUAL TO 3YO PRESERVATIVE FREE IM: ICD-10-PCS | Mod: S$GLB,,, | Performed by: FAMILY MEDICINE

## 2019-02-21 PROCEDURE — 99214 OFFICE O/P EST MOD 30 MIN: CPT | Mod: 25,S$GLB,, | Performed by: FAMILY MEDICINE

## 2019-02-21 PROCEDURE — 99999 PR PBB SHADOW E&M-EST. PATIENT-LVL IV: CPT | Mod: PBBFAC,,, | Performed by: FAMILY MEDICINE

## 2019-02-21 PROCEDURE — 99999 PR PBB SHADOW E&M-EST. PATIENT-LVL IV: ICD-10-PCS | Mod: PBBFAC,,, | Performed by: FAMILY MEDICINE

## 2019-02-21 NOTE — PROGRESS NOTES
Subjective:       Patient ID: Rafael Gautam Jr. is a 53 y.o. male.    Chief Complaint: Follow-up    HPI Comments: Here today for  f/u on chronic health issues  He reports overall feeling well.  HTN - tolerating Diovan 80mg and amlodipine 2.5mg daily; home /70  HLD - tolerating Crestor 10mg and Zetia 10mg daily  CAD s/p cardiac stents - tolerating Aspirin 81mg, Plavix 75mg daily  Gout - uses indomethacin PRN flares; no flares in the last 6 months; He is taking allopurinol 300mg daily  b12 deficiency - tolerating oral B12     C/o numbness and pain in the right hand that occurs at night. Does not involve the 5th finger    Past Medical History:   Diagnosis Date    Anemia     B12 deficiency anemia     CAD (coronary artery disease)     Carotid atherosclerosis     Essential hypertension 7/8/2015    Hemorrhoid     HLD (hyperlipidemia)     Hypertension     Hyperuricemia     right foot and left wrist    Stroke 1/2012     Current Outpatient Medications on File Prior to Visit   Medication Sig Dispense Refill    allopurinol (ZYLOPRIM) 300 MG tablet Take 1 tablet (300 mg total) by mouth once daily. 90 tablet 3    amLODIPine (NORVASC) 2.5 MG tablet Take 1 tablet (2.5 mg total) by mouth every evening. 90 tablet 3    aspirin 81 mg Tab Take 81 mg by mouth once daily.        b complex vitamins capsule Take 1 capsule by mouth once daily. 90 capsule 3    clopidogrel (PLAVIX) 75 mg tablet Take 1 tablet (75 mg total) by mouth once daily. 90 tablet 3    ezetimibe (ZETIA) 10 mg tablet Take 1 tablet (10 mg total) by mouth once daily. 90 tablet 3    folic acid (FOLVITE) 400 MCG tablet Take 1 tablet (400 mcg total) by mouth once daily. 90 tablet 3    nitroGLYCERIN (NITROSTAT) 0.4 MG SL tablet Place 1 tablet (0.4 mg total) under the tongue every 5 (five) minutes as needed. 25 tablet 6    rosuvastatin (CRESTOR) 10 MG tablet Take 1 tablet (10 mg total) by mouth every evening. 90 tablet 3    valsartan (DIOVAN) 80 MG  "tablet Take 1 tablet (160 mg total) by mouth every morning. 90 tablet 5     No current facility-administered medications on file prior to visit.        Review of Systems   Constitutional: Negative for fever, appetite change and unexpected weight change.   HENT: Negative for ear pain, nosebleeds, congestion, sore throat, rhinorrhea, trouble swallowing, neck pain, postnasal drip, sinus pressure and ear discharge.    Eyes: Negative for pain, discharge, redness and visual disturbance.   Respiratory: Negative for apnea, cough, chest tightness, shortness of breath and wheezing.    Cardiovascular: Negative for chest pain, palpitations and leg swelling.   Gastrointestinal: Negative for nausea, vomiting, abdominal pain, diarrhea, constipation, blood in stool and abdominal distention.   Genitourinary: Negative for dysuria, urgency, frequency, hematuria, flank pain, scrotal swelling, difficulty urinating and testicular pain.   Skin: Negative for color change, rash and wound.   Neurological: Negative for dizziness, tremors, seizures, syncope, speech difficulty, weakness, light-headedness and numbness.   Hematological: Negative for adenopathy. Does not bruise/bleed easily.   Psychiatric/Behavioral: Negative for suicidal ideas, hallucinations, behavioral problems and confusion.   Intermittent right knee pains      Objective:      /86   Pulse 60   Resp 18   Ht 5' 6" (1.676 m)   Wt 94.6 kg (208 lb 8.9 oz)   SpO2 98%   BMI 33.66 kg/m²   Physical Exam   Constitutional: He is oriented to person, place, and time. He appears well-developed and well-nourished. He is active and cooperative.   Neck: Trachea normal, normal range of motion and full passive range of motion without pain. Neck supple. Normal carotid pulses and no JVD present. Carotid bruit is not present. No tracheal deviation present. No mass and no thyromegaly present.   Cardiovascular: Normal rate, regular rhythm, S1 normal, S2 normal, normal heart sounds and " intact distal pulses.  Exam reveals no gallop and no friction rub.    No murmur heard.  Pulmonary/Chest: Effort normal and breath sounds normal. No respiratory distress. He has no wheezes. He has no rales.   Abdominal: Soft. Normal appearance and bowel sounds are normal. He exhibits no distension and no mass. There is no hepatosplenomegaly. There is no tenderness. There is no rebound and no guarding.   Musculoskeletal: Normal range of motion. He exhibits no edema and no tenderness.   Neurological: He is alert and oriented to person, place, and time. He has normal strength. No cranial nerve deficit. Coordination normal.   Skin: Skin is warm, dry and intact. No rash noted. No cyanosis or erythema. No pallor. Nails show no clubbing.   Psychiatric: He has a normal mood and affect. His speech is normal and behavior is normal. Judgment and thought content normal.   Right lower leg varicosities and 1+ edema.  Right hand: negative Tinnels; FROM in wrist; normal strength; no thenar atrophy      Results for orders placed or performed in visit on 08/11/18   Comprehensive metabolic panel   Result Value Ref Range    Sodium 143 136 - 145 mmol/L    Potassium 4.4 3.5 - 5.1 mmol/L    Chloride 107 95 - 110 mmol/L    CO2 27 23 - 29 mmol/L    Glucose 97 70 - 110 mg/dL    BUN, Bld 12 6 - 20 mg/dL    Creatinine 0.9 0.5 - 1.4 mg/dL    Calcium 9.5 8.7 - 10.5 mg/dL    Total Protein 6.9 6.0 - 8.4 g/dL    Albumin 4.0 3.5 - 5.2 g/dL    Total Bilirubin 0.5 0.1 - 1.0 mg/dL    Alkaline Phosphatase 60 55 - 135 U/L    AST 23 10 - 40 U/L    ALT 27 10 - 44 U/L    Anion Gap 9 8 - 16 mmol/L    eGFR if African American >60.0 >60 mL/min/1.73 m^2    eGFR if non African American >60.0 >60 mL/min/1.73 m^2   Lipid panel   Result Value Ref Range    Cholesterol 137 120 - 199 mg/dL    Triglycerides 157 (H) 30 - 150 mg/dL    HDL 36 (L) 40 - 75 mg/dL    LDL Cholesterol 69.6 63.0 - 159.0 mg/dL    HDL/Chol Ratio 26.3 20.0 - 50.0 %    Total Cholesterol/HDL Ratio 3.8  2.0 - 5.0    Non-HDL Cholesterol 101 mg/dL   PSA, Screening   Result Value Ref Range    PSA, SCREEN 0.56 0.00 - 4.00 ng/mL   CBC auto differential   Result Value Ref Range    WBC 7.96 3.90 - 12.70 K/uL    RBC 4.62 4.60 - 6.20 M/uL    Hemoglobin 14.0 14.0 - 18.0 g/dL    Hematocrit 42.8 40.0 - 54.0 %    MCV 93 82 - 98 fL    MCH 30.3 27.0 - 31.0 pg    MCHC 32.7 32.0 - 36.0 g/dL    RDW 12.8 11.5 - 14.5 %    Platelets 271 150 - 350 K/uL    MPV 9.2 9.2 - 12.9 fL    Immature Granulocytes 0.3 0.0 - 0.5 %    Gran # (ANC) 3.5 1.8 - 7.7 K/uL    Immature Grans (Abs) 0.02 0.00 - 0.04 K/uL    Lymph # 3.5 1.0 - 4.8 K/uL    Mono # 0.7 0.3 - 1.0 K/uL    Eos # 0.2 0.0 - 0.5 K/uL    Baso # 0.03 0.00 - 0.20 K/uL    nRBC 0 0 /100 WBC    Gran% 43.3 38.0 - 73.0 %    Lymph% 44.3 18.0 - 48.0 %    Mono% 8.7 4.0 - 15.0 %    Eosinophil% 3.0 0.0 - 8.0 %    Basophil% 0.4 0.0 - 1.9 %    Differential Method Automated    Uric acid   Result Value Ref Range    Uric Acid 5.7 3.4 - 7.0 mg/dL       Assessment:       1. Mixed hyperlipidemia    2. Hyperuricemia    3. Coronary artery disease involving native coronary artery of native heart without angina pectoris    4. Essential hypertension    5. Prostate cancer screening    6. Carpal tunnel syndrome of right wrist        Plan:       Mixed hyperlipidemia  -     Comprehensive metabolic panel; Future; Expected date: 08/20/2019  -     Lipid panel; Future; Expected date: 08/20/2019    Hyperuricemia  -     Uric acid; Future; Expected date: 08/20/2019    Coronary artery disease involving native coronary artery of native heart without angina pectoris    Essential hypertension  -     CBC auto differential; Future; Expected date: 08/20/2019    Prostate cancer screening  -     PSA, Screening; Future; Expected date: 08/20/2019    Carpal tunnel syndrome of right wrist      Wrist stretching;  Discussed home splint and relative rest    Hypertension (controlled)  Continue present meds    Hld (hyperlipidemia)  Continue  Zetia and Crestor    Cad (coronary artery disease)  Continue f/u with cardiology    Gout, chronic  Continue indomethacin PRN  Continue allopurinol daily    B12 deficiency anemia  Continue oral B12 1,000mcg daily    Counseled on regular exercise, maintenance of a healthy weight, balanced diet rich in fruits/vegetables and lean protein, and avoidance of unhealthy habits like smoking and excessive alcohol intake.  Goal weight of 190    F/u 6 months with labs

## 2019-05-09 ENCOUNTER — TELEPHONE (OUTPATIENT)
Dept: FAMILY MEDICINE | Facility: CLINIC | Age: 54
End: 2019-05-09

## 2019-05-09 ENCOUNTER — OFFICE VISIT (OUTPATIENT)
Dept: FAMILY MEDICINE | Facility: CLINIC | Age: 54
End: 2019-05-09
Payer: COMMERCIAL

## 2019-05-09 VITALS
OXYGEN SATURATION: 97 % | WEIGHT: 202.81 LBS | DIASTOLIC BLOOD PRESSURE: 82 MMHG | HEIGHT: 66 IN | TEMPERATURE: 98 F | BODY MASS INDEX: 32.59 KG/M2 | RESPIRATION RATE: 18 BRPM | SYSTOLIC BLOOD PRESSURE: 136 MMHG | HEART RATE: 68 BPM

## 2019-05-09 DIAGNOSIS — R26.2 DIFFICULTY WALKING: ICD-10-CM

## 2019-05-09 DIAGNOSIS — M79.672 PAIN OF LEFT HEEL: ICD-10-CM

## 2019-05-09 DIAGNOSIS — R50.9 FEVER, UNSPECIFIED FEVER CAUSE: Primary | ICD-10-CM

## 2019-05-09 DIAGNOSIS — Z86.73 HISTORY OF CVA (CEREBROVASCULAR ACCIDENT): ICD-10-CM

## 2019-05-09 DIAGNOSIS — R09.81 NASAL CONGESTION: ICD-10-CM

## 2019-05-09 DIAGNOSIS — M76.60 ACHILLES TENDON PAIN: ICD-10-CM

## 2019-05-09 LAB
INFLUENZA A, MOLECULAR: POSITIVE
INFLUENZA B, MOLECULAR: NEGATIVE
SPECIMEN SOURCE: ABNORMAL

## 2019-05-09 PROCEDURE — 3079F DIAST BP 80-89 MM HG: CPT | Mod: CPTII,S$GLB,, | Performed by: NURSE PRACTITIONER

## 2019-05-09 PROCEDURE — 3075F PR MOST RECENT SYSTOLIC BLOOD PRESS GE 130-139MM HG: ICD-10-PCS | Mod: CPTII,S$GLB,, | Performed by: NURSE PRACTITIONER

## 2019-05-09 PROCEDURE — 3075F SYST BP GE 130 - 139MM HG: CPT | Mod: CPTII,S$GLB,, | Performed by: NURSE PRACTITIONER

## 2019-05-09 PROCEDURE — 99214 PR OFFICE/OUTPT VISIT, EST, LEVL IV, 30-39 MIN: ICD-10-PCS | Mod: S$GLB,,, | Performed by: NURSE PRACTITIONER

## 2019-05-09 PROCEDURE — 3079F PR MOST RECENT DIASTOLIC BLOOD PRESSURE 80-89 MM HG: ICD-10-PCS | Mod: CPTII,S$GLB,, | Performed by: NURSE PRACTITIONER

## 2019-05-09 PROCEDURE — 87502 INFLUENZA DNA AMP PROBE: CPT | Mod: PO

## 2019-05-09 PROCEDURE — 99999 PR PBB SHADOW E&M-EST. PATIENT-LVL IV: CPT | Mod: PBBFAC,,, | Performed by: NURSE PRACTITIONER

## 2019-05-09 PROCEDURE — 99999 PR PBB SHADOW E&M-EST. PATIENT-LVL IV: ICD-10-PCS | Mod: PBBFAC,,, | Performed by: NURSE PRACTITIONER

## 2019-05-09 PROCEDURE — 3008F PR BODY MASS INDEX (BMI) DOCUMENTED: ICD-10-PCS | Mod: CPTII,S$GLB,, | Performed by: NURSE PRACTITIONER

## 2019-05-09 PROCEDURE — 99214 OFFICE O/P EST MOD 30 MIN: CPT | Mod: S$GLB,,, | Performed by: NURSE PRACTITIONER

## 2019-05-09 PROCEDURE — 3008F BODY MASS INDEX DOCD: CPT | Mod: CPTII,S$GLB,, | Performed by: NURSE PRACTITIONER

## 2019-05-09 NOTE — TELEPHONE ENCOUNTER
----- Message from Neela Evans sent at 5/9/2019 12:17 PM CDT -----  Contact: Evonne Gautam (Spouse)  Type: Needs Medical Advice    Who Called:  Evonne Gautam (Spouse)  Symptoms (please be specific):  Patient was called and diagnosed with the flu  How long has patient had these symptoms:  Diagnosed today  Pharmacy name and phone #:  [email or web address]  Best Call Back Number: Evonne at   Additional Information: Calling to speak with the Nurse about the diagnosis. Please advise

## 2019-05-09 NOTE — PROGRESS NOTES
Subjective:       Patient ID: Rafael Gautam Jr. is a 54 y.o. male.    Chief Complaint: Pain (back, side, arms, body aches, low grade fever, cough sometimes productive, currently taking otc coricidin for about 3 days)    Here today with four-day history of fever myalgias congestion. He also has left lower leg pain and heel pain.  Problems walking problems when foot comes off of floor he is able to walk.  Hit the back of his left heel on a machine at work.  Feels Achilles tendon is tighter.    Cough   This is a new problem. The current episode started in the past 7 days. The problem has been gradually worsening. The cough is productive of purulent sputum. Associated symptoms include chills, a fever, myalgias, nasal congestion and rhinorrhea. Pertinent negatives include no chest pain, ear congestion, ear pain, headaches, heartburn, hemoptysis, postnasal drip, rash, sore throat, shortness of breath, sweats, weight loss or wheezing. Associated symptoms comments: Low grade fever . Treatments tried: stevieian      Vitals:    05/09/19 1044   BP: 136/82   Pulse: 68   Resp: 18   Temp: 98.1 °F (36.7 °C)     Review of Systems   Constitutional: Positive for chills and fever. Negative for fatigue and weight loss.   HENT: Positive for congestion and rhinorrhea. Negative for ear pain, postnasal drip and sore throat.    Eyes: Negative.    Respiratory: Negative.  Negative for cough, hemoptysis, shortness of breath and wheezing.    Cardiovascular: Negative.  Negative for chest pain.   Gastrointestinal: Negative.  Negative for abdominal pain, diarrhea, heartburn and nausea.   Endocrine: Negative.    Genitourinary: Negative for dysuria and hematuria.   Musculoskeletal: Positive for arthralgias, gait problem and myalgias.   Skin: Negative.  Negative for color change and rash.   Allergic/Immunologic: Negative.    Neurological: Negative for numbness and headaches.   Hematological: Negative.    Psychiatric/Behavioral: Negative.         Past Medical History:   Diagnosis Date    Anemia     B12 deficiency anemia     CAD (coronary artery disease)     Carotid atherosclerosis     Essential hypertension 7/8/2015    Hemorrhoid     HLD (hyperlipidemia)     Hypertension     Hyperuricemia     right foot and left wrist    Stroke 1/2012       Objective:      Physical Exam   Constitutional: He is oriented to person, place, and time. He appears well-developed and well-nourished.   HENT:   Head: Normocephalic and atraumatic.   Right Ear: Hearing, tympanic membrane, external ear and ear canal normal.   Left Ear: Hearing, tympanic membrane, external ear and ear canal normal.   Nose: Nose normal. No mucosal edema or rhinorrhea. Right sinus exhibits no maxillary sinus tenderness and no frontal sinus tenderness. Left sinus exhibits no maxillary sinus tenderness and no frontal sinus tenderness.   Mouth/Throat: Uvula is midline, oropharynx is clear and moist and mucous membranes are normal.   Eyes: Pupils are equal, round, and reactive to light. Conjunctivae and EOM are normal.   Neck: Normal range of motion. Neck supple.   Cardiovascular: Normal rate, regular rhythm, normal heart sounds and intact distal pulses. Exam reveals no friction rub.   No murmur heard.  Pulmonary/Chest: Effort normal and breath sounds normal. No stridor. No respiratory distress. He has no wheezes.   Abdominal: Soft. Bowel sounds are normal.   Musculoskeletal: Normal range of motion. He exhibits no edema.        Left lower leg: He exhibits tenderness.        Left foot: There is tenderness.   Neurological: He is alert and oriented to person, place, and time. No cranial nerve deficit.   Skin: Skin is warm and dry.   Psychiatric: He has a normal mood and affect. His behavior is normal. Judgment and thought content normal.   Nursing note and vitals reviewed.      Assessment:       1. Fever, unspecified fever cause    2. Nasal congestion    3. Pain of left heel    4. Difficulty walking     5. History of CVA (cerebrovascular accident)    6. Achilles tendon pain        Plan:       Fever, unspecified fever cause  -     Influenza A & B by Molecular  -       Nasal congestion  -     Influenza A & B by Molecular    Pain of left heel  -    Difficulty walking  -         History of CVA (cerebrovascular accident)  Stable on aspirin Plavix and cholesterol    Achilles tendon pain  Provided stretches for him he is able to ambulate feel that this is some inflammation going on in his Achilles needs to stretch.        Will call back with flu swab follow-up with any other issues

## 2019-06-05 DIAGNOSIS — E78.2 MIXED HYPERLIPIDEMIA: ICD-10-CM

## 2019-06-05 RX ORDER — EZETIMIBE 10 MG/1
10 TABLET ORAL DAILY
Qty: 90 TABLET | Refills: 3 | Status: SHIPPED | OUTPATIENT
Start: 2019-06-05 | End: 2019-08-21 | Stop reason: SDUPTHER

## 2019-08-08 ENCOUNTER — PATIENT OUTREACH (OUTPATIENT)
Dept: ADMINISTRATIVE | Facility: HOSPITAL | Age: 54
End: 2019-08-08

## 2019-08-17 ENCOUNTER — LAB VISIT (OUTPATIENT)
Dept: LAB | Facility: HOSPITAL | Age: 54
End: 2019-08-17
Attending: INTERNAL MEDICINE
Payer: COMMERCIAL

## 2019-08-17 DIAGNOSIS — E78.2 MIXED HYPERLIPIDEMIA: ICD-10-CM

## 2019-08-17 DIAGNOSIS — Z98.61 HISTORY OF PTCA: Chronic | ICD-10-CM

## 2019-08-17 DIAGNOSIS — I25.10 CORONARY ARTERY DISEASE INVOLVING NATIVE CORONARY ARTERY OF NATIVE HEART WITHOUT ANGINA PECTORIS: ICD-10-CM

## 2019-08-17 DIAGNOSIS — E79.0 HYPERURICEMIA: ICD-10-CM

## 2019-08-17 DIAGNOSIS — I10 ESSENTIAL HYPERTENSION: ICD-10-CM

## 2019-08-17 DIAGNOSIS — Z82.49 FAMILY HISTORY OF ISCHEMIC HEART DISEASE (IHD): ICD-10-CM

## 2019-08-17 DIAGNOSIS — Z12.5 PROSTATE CANCER SCREENING: ICD-10-CM

## 2019-08-17 LAB
ALBUMIN SERPL BCP-MCNC: 4.1 G/DL (ref 3.5–5.2)
ALBUMIN SERPL BCP-MCNC: 4.1 G/DL (ref 3.5–5.2)
ALP SERPL-CCNC: 63 U/L (ref 55–135)
ALP SERPL-CCNC: 63 U/L (ref 55–135)
ALT SERPL W/O P-5'-P-CCNC: 26 U/L (ref 10–44)
ALT SERPL W/O P-5'-P-CCNC: 26 U/L (ref 10–44)
ANION GAP SERPL CALC-SCNC: 6 MMOL/L (ref 8–16)
ANION GAP SERPL CALC-SCNC: 6 MMOL/L (ref 8–16)
AST SERPL-CCNC: 22 U/L (ref 10–40)
AST SERPL-CCNC: 22 U/L (ref 10–40)
BASOPHILS # BLD AUTO: 0.03 K/UL (ref 0–0.2)
BASOPHILS # BLD AUTO: 0.03 K/UL (ref 0–0.2)
BASOPHILS NFR BLD: 0.4 % (ref 0–1.9)
BASOPHILS NFR BLD: 0.4 % (ref 0–1.9)
BILIRUB SERPL-MCNC: 0.4 MG/DL (ref 0.1–1)
BILIRUB SERPL-MCNC: 0.4 MG/DL (ref 0.1–1)
BUN SERPL-MCNC: 13 MG/DL (ref 6–20)
BUN SERPL-MCNC: 13 MG/DL (ref 6–20)
CALCIUM SERPL-MCNC: 9.8 MG/DL (ref 8.7–10.5)
CALCIUM SERPL-MCNC: 9.8 MG/DL (ref 8.7–10.5)
CHLORIDE SERPL-SCNC: 104 MMOL/L (ref 95–110)
CHLORIDE SERPL-SCNC: 104 MMOL/L (ref 95–110)
CHOLEST SERPL-MCNC: 149 MG/DL (ref 120–199)
CHOLEST SERPL-MCNC: 149 MG/DL (ref 120–199)
CHOLEST/HDLC SERPL: 4.1 {RATIO} (ref 2–5)
CHOLEST/HDLC SERPL: 4.1 {RATIO} (ref 2–5)
CO2 SERPL-SCNC: 30 MMOL/L (ref 23–29)
CO2 SERPL-SCNC: 30 MMOL/L (ref 23–29)
COMPLEXED PSA SERPL-MCNC: 0.48 NG/ML (ref 0–4)
CREAT SERPL-MCNC: 0.9 MG/DL (ref 0.5–1.4)
CREAT SERPL-MCNC: 0.9 MG/DL (ref 0.5–1.4)
DIFFERENTIAL METHOD: ABNORMAL
DIFFERENTIAL METHOD: ABNORMAL
EOSINOPHIL # BLD AUTO: 0.3 K/UL (ref 0–0.5)
EOSINOPHIL # BLD AUTO: 0.3 K/UL (ref 0–0.5)
EOSINOPHIL NFR BLD: 4.1 % (ref 0–8)
EOSINOPHIL NFR BLD: 4.1 % (ref 0–8)
ERYTHROCYTE [DISTWIDTH] IN BLOOD BY AUTOMATED COUNT: 13.2 % (ref 11.5–14.5)
ERYTHROCYTE [DISTWIDTH] IN BLOOD BY AUTOMATED COUNT: 13.2 % (ref 11.5–14.5)
EST. GFR  (AFRICAN AMERICAN): >60 ML/MIN/1.73 M^2
EST. GFR  (AFRICAN AMERICAN): >60 ML/MIN/1.73 M^2
EST. GFR  (NON AFRICAN AMERICAN): >60 ML/MIN/1.73 M^2
EST. GFR  (NON AFRICAN AMERICAN): >60 ML/MIN/1.73 M^2
GLUCOSE SERPL-MCNC: 102 MG/DL (ref 70–110)
GLUCOSE SERPL-MCNC: 102 MG/DL (ref 70–110)
HCT VFR BLD AUTO: 44.3 % (ref 40–54)
HCT VFR BLD AUTO: 44.3 % (ref 40–54)
HDLC SERPL-MCNC: 36 MG/DL (ref 40–75)
HDLC SERPL-MCNC: 36 MG/DL (ref 40–75)
HDLC SERPL: 24.2 % (ref 20–50)
HDLC SERPL: 24.2 % (ref 20–50)
HGB BLD-MCNC: 14.1 G/DL (ref 14–18)
HGB BLD-MCNC: 14.1 G/DL (ref 14–18)
IMM GRANULOCYTES # BLD AUTO: 0.01 K/UL (ref 0–0.04)
IMM GRANULOCYTES # BLD AUTO: 0.01 K/UL (ref 0–0.04)
IMM GRANULOCYTES NFR BLD AUTO: 0.1 % (ref 0–0.5)
IMM GRANULOCYTES NFR BLD AUTO: 0.1 % (ref 0–0.5)
LDLC SERPL CALC-MCNC: 79.4 MG/DL (ref 63–159)
LDLC SERPL CALC-MCNC: 79.4 MG/DL (ref 63–159)
LYMPHOCYTES # BLD AUTO: 2.7 K/UL (ref 1–4.8)
LYMPHOCYTES # BLD AUTO: 2.7 K/UL (ref 1–4.8)
LYMPHOCYTES NFR BLD: 39.7 % (ref 18–48)
LYMPHOCYTES NFR BLD: 39.7 % (ref 18–48)
MCH RBC QN AUTO: 29.9 PG (ref 27–31)
MCH RBC QN AUTO: 29.9 PG (ref 27–31)
MCHC RBC AUTO-ENTMCNC: 31.8 G/DL (ref 32–36)
MCHC RBC AUTO-ENTMCNC: 31.8 G/DL (ref 32–36)
MCV RBC AUTO: 94 FL (ref 82–98)
MCV RBC AUTO: 94 FL (ref 82–98)
MONOCYTES # BLD AUTO: 0.9 K/UL (ref 0.3–1)
MONOCYTES # BLD AUTO: 0.9 K/UL (ref 0.3–1)
MONOCYTES NFR BLD: 13.7 % (ref 4–15)
MONOCYTES NFR BLD: 13.7 % (ref 4–15)
NEUTROPHILS # BLD AUTO: 2.9 K/UL (ref 1.8–7.7)
NEUTROPHILS # BLD AUTO: 2.9 K/UL (ref 1.8–7.7)
NEUTROPHILS NFR BLD: 42 % (ref 38–73)
NEUTROPHILS NFR BLD: 42 % (ref 38–73)
NONHDLC SERPL-MCNC: 113 MG/DL
NONHDLC SERPL-MCNC: 113 MG/DL
NRBC BLD-RTO: 0 /100 WBC
NRBC BLD-RTO: 0 /100 WBC
PLATELET # BLD AUTO: 278 K/UL (ref 150–350)
PLATELET # BLD AUTO: 278 K/UL (ref 150–350)
PMV BLD AUTO: 8.9 FL (ref 9.2–12.9)
PMV BLD AUTO: 8.9 FL (ref 9.2–12.9)
POTASSIUM SERPL-SCNC: 4.3 MMOL/L (ref 3.5–5.1)
POTASSIUM SERPL-SCNC: 4.3 MMOL/L (ref 3.5–5.1)
PROT SERPL-MCNC: 7.2 G/DL (ref 6–8.4)
PROT SERPL-MCNC: 7.2 G/DL (ref 6–8.4)
RBC # BLD AUTO: 4.71 M/UL (ref 4.6–6.2)
RBC # BLD AUTO: 4.71 M/UL (ref 4.6–6.2)
SODIUM SERPL-SCNC: 140 MMOL/L (ref 136–145)
SODIUM SERPL-SCNC: 140 MMOL/L (ref 136–145)
TRIGL SERPL-MCNC: 168 MG/DL (ref 30–150)
TRIGL SERPL-MCNC: 168 MG/DL (ref 30–150)
URATE SERPL-MCNC: 6.2 MG/DL (ref 3.4–7)
WBC # BLD AUTO: 6.85 K/UL (ref 3.9–12.7)
WBC # BLD AUTO: 6.85 K/UL (ref 3.9–12.7)

## 2019-08-17 PROCEDURE — 80061 LIPID PANEL: CPT

## 2019-08-17 PROCEDURE — 36415 COLL VENOUS BLD VENIPUNCTURE: CPT | Mod: PO

## 2019-08-17 PROCEDURE — 80053 COMPREHEN METABOLIC PANEL: CPT

## 2019-08-17 PROCEDURE — 84550 ASSAY OF BLOOD/URIC ACID: CPT

## 2019-08-17 PROCEDURE — 84153 ASSAY OF PSA TOTAL: CPT

## 2019-08-17 PROCEDURE — 85025 COMPLETE CBC W/AUTO DIFF WBC: CPT

## 2019-08-21 ENCOUNTER — OFFICE VISIT (OUTPATIENT)
Dept: FAMILY MEDICINE | Facility: CLINIC | Age: 54
End: 2019-08-21
Payer: COMMERCIAL

## 2019-08-21 VITALS
HEIGHT: 66 IN | DIASTOLIC BLOOD PRESSURE: 84 MMHG | HEART RATE: 60 BPM | SYSTOLIC BLOOD PRESSURE: 132 MMHG | WEIGHT: 208.75 LBS | OXYGEN SATURATION: 95 % | BODY MASS INDEX: 33.55 KG/M2

## 2019-08-21 DIAGNOSIS — I10 ESSENTIAL HYPERTENSION: Chronic | ICD-10-CM

## 2019-08-21 DIAGNOSIS — E78.2 MIXED HYPERLIPIDEMIA: ICD-10-CM

## 2019-08-21 DIAGNOSIS — I25.10 CORONARY ARTERY DISEASE INVOLVING NATIVE CORONARY ARTERY OF NATIVE HEART WITHOUT ANGINA PECTORIS: ICD-10-CM

## 2019-08-21 DIAGNOSIS — E79.0 HYPERURICEMIA: ICD-10-CM

## 2019-08-21 DIAGNOSIS — Z00.00 PREVENTATIVE HEALTH CARE: Primary | ICD-10-CM

## 2019-08-21 DIAGNOSIS — M1A.9XX0 CHRONIC GOUT WITHOUT TOPHUS, UNSPECIFIED CAUSE, UNSPECIFIED SITE: ICD-10-CM

## 2019-08-21 PROCEDURE — 3075F SYST BP GE 130 - 139MM HG: CPT | Mod: CPTII,S$GLB,, | Performed by: FAMILY MEDICINE

## 2019-08-21 PROCEDURE — 3079F PR MOST RECENT DIASTOLIC BLOOD PRESSURE 80-89 MM HG: ICD-10-PCS | Mod: CPTII,S$GLB,, | Performed by: FAMILY MEDICINE

## 2019-08-21 PROCEDURE — 3079F DIAST BP 80-89 MM HG: CPT | Mod: CPTII,S$GLB,, | Performed by: FAMILY MEDICINE

## 2019-08-21 PROCEDURE — 99999 PR PBB SHADOW E&M-EST. PATIENT-LVL III: CPT | Mod: PBBFAC,,, | Performed by: FAMILY MEDICINE

## 2019-08-21 PROCEDURE — 99396 PR PREVENTIVE VISIT,EST,40-64: ICD-10-PCS | Mod: S$GLB,,, | Performed by: FAMILY MEDICINE

## 2019-08-21 PROCEDURE — 99396 PREV VISIT EST AGE 40-64: CPT | Mod: S$GLB,,, | Performed by: FAMILY MEDICINE

## 2019-08-21 PROCEDURE — 3075F PR MOST RECENT SYSTOLIC BLOOD PRESS GE 130-139MM HG: ICD-10-PCS | Mod: CPTII,S$GLB,, | Performed by: FAMILY MEDICINE

## 2019-08-21 PROCEDURE — 99999 PR PBB SHADOW E&M-EST. PATIENT-LVL III: ICD-10-PCS | Mod: PBBFAC,,, | Performed by: FAMILY MEDICINE

## 2019-08-21 RX ORDER — ALLOPURINOL 300 MG/1
300 TABLET ORAL DAILY
Qty: 90 TABLET | Refills: 3 | Status: SHIPPED | OUTPATIENT
Start: 2019-08-21 | End: 2020-05-29

## 2019-08-21 RX ORDER — CLOPIDOGREL BISULFATE 75 MG/1
75 TABLET ORAL DAILY
Qty: 90 TABLET | Refills: 3 | Status: SHIPPED | OUTPATIENT
Start: 2019-08-21 | End: 2020-05-29

## 2019-08-21 RX ORDER — AMLODIPINE BESYLATE 2.5 MG/1
2.5 TABLET ORAL NIGHTLY
Qty: 90 TABLET | Refills: 3 | Status: SHIPPED | OUTPATIENT
Start: 2019-08-21 | End: 2020-05-29

## 2019-08-21 RX ORDER — VALSARTAN 80 MG/1
TABLET ORAL
Qty: 90 TABLET | Refills: 3 | Status: ON HOLD | OUTPATIENT
Start: 2019-08-21 | End: 2019-09-18 | Stop reason: HOSPADM

## 2019-08-21 RX ORDER — NITROGLYCERIN 0.4 MG/1
0.4 TABLET SUBLINGUAL EVERY 5 MIN PRN
Qty: 25 TABLET | Refills: 0 | Status: SHIPPED | OUTPATIENT
Start: 2019-08-21

## 2019-08-21 RX ORDER — EZETIMIBE 10 MG/1
10 TABLET ORAL DAILY
Qty: 90 TABLET | Refills: 3 | Status: SHIPPED | OUTPATIENT
Start: 2019-08-21 | End: 2020-05-29

## 2019-08-21 RX ORDER — ROSUVASTATIN CALCIUM 10 MG/1
10 TABLET, COATED ORAL NIGHTLY
Qty: 90 TABLET | Refills: 3 | Status: SHIPPED | OUTPATIENT
Start: 2019-08-21 | End: 2020-05-29

## 2019-08-21 NOTE — PROGRESS NOTES
Subjective:       Patient ID: Rafael Gautam Jr. is a 54 y.o. male.    Chief Complaint: Follow-up    HPI Comments: Here today for annual exam and f/u on chronic health issues    He reports overall feeling well.  HTN - tolerating Diovan 80mg and amlodipine 2.5mg daily; home /70  HLD - tolerating Crestor 10mg and Zetia 10mg daily  CAD s/p cardiac stents - tolerating Aspirin 81mg, Plavix 75mg daily; will see cardiology next week. He does admit to 2 exertional episodes of chest pain in the last 3 months. Also with intermittent PACE.  Gout - uses indomethacin PRN flares; no flares in the last 6 months; He is taking allopurinol 300mg daily  b12 deficiency - stopped oral B12         Past Medical History:   Diagnosis Date    Anemia     B12 deficiency anemia     CAD (coronary artery disease)     Carotid atherosclerosis     Essential hypertension 7/8/2015    Hemorrhoid     HLD (hyperlipidemia)     Hypertension     Hyperuricemia     right foot and left wrist    Stroke 1/2012     Current Outpatient Medications on File Prior to Visit   Medication Sig Dispense Refill    aspirin 81 mg Tab Take 81 mg by mouth once daily.        b complex vitamins capsule Take 1 capsule by mouth once daily. 90 capsule 3    folic acid (FOLVITE) 400 MCG tablet Take 1 tablet (400 mcg total) by mouth once daily. 90 tablet 3    [DISCONTINUED] allopurinol (ZYLOPRIM) 300 MG tablet Take 1 tablet (300 mg total) by mouth once daily. 90 tablet 3    [DISCONTINUED] amLODIPine (NORVASC) 2.5 MG tablet Take 1 tablet (2.5 mg total) by mouth every evening. 90 tablet 3    [DISCONTINUED] clopidogrel (PLAVIX) 75 mg tablet Take 1 tablet (75 mg total) by mouth once daily. 90 tablet 3    [DISCONTINUED] ezetimibe (ZETIA) 10 mg tablet Take 1 tablet (10 mg total) by mouth once daily. 90 tablet 3    [DISCONTINUED] nitroGLYCERIN (NITROSTAT) 0.4 MG SL tablet Place 1 tablet (0.4 mg total) under the tongue every 5 (five) minutes as needed. 25 tablet 6     "[DISCONTINUED] rosuvastatin (CRESTOR) 10 MG tablet Take 1 tablet (10 mg total) by mouth every evening. 90 tablet 3    [DISCONTINUED] valsartan (DIOVAN) 80 MG tablet Take 1 tablet (160 mg total) by mouth every morning. 90 tablet 5     No current facility-administered medications on file prior to visit.        Review of Systems   Constitutional: Negative for fever, appetite change and unexpected weight change.   HENT: Negative for ear pain, nosebleeds, congestion, sore throat, rhinorrhea, trouble swallowing, neck pain, postnasal drip, sinus pressure and ear discharge.    Eyes: Negative for pain, discharge, redness and visual disturbance.   Respiratory: Negative for apnea, cough, chest tightness, shortness of breath and wheezing.    Cardiovascular: Negative for chest pain, palpitations and leg swelling.   Gastrointestinal: Negative for nausea, vomiting, abdominal pain, diarrhea, constipation, blood in stool and abdominal distention.   Genitourinary: Negative for dysuria, urgency, frequency, hematuria, flank pain, scrotal swelling, difficulty urinating and testicular pain.   Skin: Negative for color change, rash and wound.   Neurological: Negative for dizziness, tremors, seizures, syncope, speech difficulty, weakness, light-headedness and numbness.   Hematological: Negative for adenopathy. Does not bruise/bleed easily.   Psychiatric/Behavioral: Negative for suicidal ideas, hallucinations, behavioral problems and confusion.   Intermittent right knee pains      Objective:      /84   Pulse 60   Ht 5' 6" (1.676 m)   Wt 94.7 kg (208 lb 12.4 oz)   SpO2 95%   BMI 33.70 kg/m²   Physical Exam   Constitutional: He is oriented to person, place, and time. He appears well-developed and well-nourished. He is active and cooperative.   Neck: Trachea normal, normal range of motion and full passive range of motion without pain. Neck supple. Normal carotid pulses and no JVD present. Carotid bruit is not present. No tracheal " deviation present. No mass and no thyromegaly present.   Cardiovascular: Normal rate, regular rhythm, S1 normal, S2 normal, normal heart sounds and intact distal pulses.  Exam reveals no gallop and no friction rub.    No murmur heard.  Pulmonary/Chest: Effort normal and breath sounds normal. No respiratory distress. He has no wheezes. He has no rales.   Abdominal: Soft. Normal appearance and bowel sounds are normal. He exhibits no distension and no mass. There is no hepatosplenomegaly. There is no tenderness. There is no rebound and no guarding.   Musculoskeletal: Normal range of motion. He exhibits no edema and no tenderness.   Neurological: He is alert and oriented to person, place, and time. He has normal strength. No cranial nerve deficit. Coordination normal.   Skin: Skin is warm, dry and intact. No rash noted. No cyanosis or erythema. No pallor. Nails show no clubbing.   Psychiatric: He has a normal mood and affect. His speech is normal and behavior is normal. Judgment and thought content normal.   Right lower leg varicosities and 1+ edema.  Right hand: negative Tinnels; FROM in wrist; normal strength; no thenar atrophy      Results for orders placed or performed in visit on 08/17/19   Lipid panel   Result Value Ref Range    Cholesterol 149 120 - 199 mg/dL    Triglycerides 168 (H) 30 - 150 mg/dL    HDL 36 (L) 40 - 75 mg/dL    LDL Cholesterol 79.4 63.0 - 159.0 mg/dL    Hdl/Cholesterol Ratio 24.2 20.0 - 50.0 %    Total Cholesterol/HDL Ratio 4.1 2.0 - 5.0    Non-HDL Cholesterol 113 mg/dL   Comprehensive metabolic panel   Result Value Ref Range    Sodium 140 136 - 145 mmol/L    Potassium 4.3 3.5 - 5.1 mmol/L    Chloride 104 95 - 110 mmol/L    CO2 30 (H) 23 - 29 mmol/L    Glucose 102 70 - 110 mg/dL    BUN, Bld 13 6 - 20 mg/dL    Creatinine 0.9 0.5 - 1.4 mg/dL    Calcium 9.8 8.7 - 10.5 mg/dL    Total Protein 7.2 6.0 - 8.4 g/dL    Albumin 4.1 3.5 - 5.2 g/dL    Total Bilirubin 0.4 0.1 - 1.0 mg/dL    Alkaline  Phosphatase 63 55 - 135 U/L    AST 22 10 - 40 U/L    ALT 26 10 - 44 U/L    Anion Gap 6 (L) 8 - 16 mmol/L    eGFR if African American >60.0 >60 mL/min/1.73 m^2    eGFR if non African American >60.0 >60 mL/min/1.73 m^2   CBC auto differential   Result Value Ref Range    WBC 6.85 3.90 - 12.70 K/uL    RBC 4.71 4.60 - 6.20 M/uL    Hemoglobin 14.1 14.0 - 18.0 g/dL    Hematocrit 44.3 40.0 - 54.0 %    Mean Corpuscular Volume 94 82 - 98 fL    Mean Corpuscular Hemoglobin 29.9 27.0 - 31.0 pg    Mean Corpuscular Hemoglobin Conc 31.8 (L) 32.0 - 36.0 g/dL    RDW 13.2 11.5 - 14.5 %    Platelets 278 150 - 350 K/uL    MPV 8.9 (L) 9.2 - 12.9 fL    Immature Granulocytes 0.1 0.0 - 0.5 %    Gran # (ANC) 2.9 1.8 - 7.7 K/uL    Immature Grans (Abs) 0.01 0.00 - 0.04 K/uL    Lymph # 2.7 1.0 - 4.8 K/uL    Mono # 0.9 0.3 - 1.0 K/uL    Eos # 0.3 0.0 - 0.5 K/uL    Baso # 0.03 0.00 - 0.20 K/uL    nRBC 0 0 /100 WBC    Gran% 42.0 38.0 - 73.0 %    Lymph% 39.7 18.0 - 48.0 %    Mono% 13.7 4.0 - 15.0 %    Eosinophil% 4.1 0.0 - 8.0 %    Basophil% 0.4 0.0 - 1.9 %    Differential Method Automated    Comprehensive metabolic panel   Result Value Ref Range    Sodium 140 136 - 145 mmol/L    Potassium 4.3 3.5 - 5.1 mmol/L    Chloride 104 95 - 110 mmol/L    CO2 30 (H) 23 - 29 mmol/L    Glucose 102 70 - 110 mg/dL    BUN, Bld 13 6 - 20 mg/dL    Creatinine 0.9 0.5 - 1.4 mg/dL    Calcium 9.8 8.7 - 10.5 mg/dL    Total Protein 7.2 6.0 - 8.4 g/dL    Albumin 4.1 3.5 - 5.2 g/dL    Total Bilirubin 0.4 0.1 - 1.0 mg/dL    Alkaline Phosphatase 63 55 - 135 U/L    AST 22 10 - 40 U/L    ALT 26 10 - 44 U/L    Anion Gap 6 (L) 8 - 16 mmol/L    eGFR if African American >60.0 >60 mL/min/1.73 m^2    eGFR if non African American >60.0 >60 mL/min/1.73 m^2   Lipid panel   Result Value Ref Range    Cholesterol 149 120 - 199 mg/dL    Triglycerides 168 (H) 30 - 150 mg/dL    HDL 36 (L) 40 - 75 mg/dL    LDL Cholesterol 79.4 63.0 - 159.0 mg/dL    Hdl/Cholesterol Ratio 24.2 20.0 - 50.0 %     Total Cholesterol/HDL Ratio 4.1 2.0 - 5.0    Non-HDL Cholesterol 113 mg/dL   CBC auto differential   Result Value Ref Range    WBC 6.85 3.90 - 12.70 K/uL    RBC 4.71 4.60 - 6.20 M/uL    Hemoglobin 14.1 14.0 - 18.0 g/dL    Hematocrit 44.3 40.0 - 54.0 %    Mean Corpuscular Volume 94 82 - 98 fL    Mean Corpuscular Hemoglobin 29.9 27.0 - 31.0 pg    Mean Corpuscular Hemoglobin Conc 31.8 (L) 32.0 - 36.0 g/dL    RDW 13.2 11.5 - 14.5 %    Platelets 278 150 - 350 K/uL    MPV 8.9 (L) 9.2 - 12.9 fL    Immature Granulocytes 0.1 0.0 - 0.5 %    Gran # (ANC) 2.9 1.8 - 7.7 K/uL    Immature Grans (Abs) 0.01 0.00 - 0.04 K/uL    Lymph # 2.7 1.0 - 4.8 K/uL    Mono # 0.9 0.3 - 1.0 K/uL    Eos # 0.3 0.0 - 0.5 K/uL    Baso # 0.03 0.00 - 0.20 K/uL    nRBC 0 0 /100 WBC    Gran% 42.0 38.0 - 73.0 %    Lymph% 39.7 18.0 - 48.0 %    Mono% 13.7 4.0 - 15.0 %    Eosinophil% 4.1 0.0 - 8.0 %    Basophil% 0.4 0.0 - 1.9 %    Differential Method Automated    Uric acid   Result Value Ref Range    Uric Acid 6.2 3.4 - 7.0 mg/dL   PSA, Screening   Result Value Ref Range    PSA, SCREEN 0.48 0.00 - 4.00 ng/mL       Assessment:       1. Preventative health care    2. Mixed hyperlipidemia    3. Chronic gout without tophus, unspecified cause, unspecified site    4. Coronary artery disease involving native coronary artery of native heart without angina pectoris    5. Hyperuricemia    6. Essential hypertension        Plan:       Preventative health care    Mixed hyperlipidemia  -     ezetimibe (ZETIA) 10 mg tablet; Take 1 tablet (10 mg total) by mouth once daily.  Dispense: 90 tablet; Refill: 3  -     rosuvastatin (CRESTOR) 10 MG tablet; Take 1 tablet (10 mg total) by mouth every evening.  Dispense: 90 tablet; Refill: 3    Chronic gout without tophus, unspecified cause, unspecified site    Coronary artery disease involving native coronary artery of native heart without angina pectoris  -     clopidogrel (PLAVIX) 75 mg tablet; Take 1 tablet (75 mg total) by mouth  once daily.  Dispense: 90 tablet; Refill: 3  -     nitroGLYCERIN (NITROSTAT) 0.4 MG SL tablet; Place 1 tablet (0.4 mg total) under the tongue every 5 (five) minutes as needed.  Dispense: 25 tablet; Refill: 0    Hyperuricemia  -     allopurinol (ZYLOPRIM) 300 MG tablet; Take 1 tablet (300 mg total) by mouth once daily.  Dispense: 90 tablet; Refill: 3    Essential hypertension  -     amLODIPine (NORVASC) 2.5 MG tablet; Take 1 tablet (2.5 mg total) by mouth every evening.  Dispense: 90 tablet; Refill: 3  -     valsartan (DIOVAN) 80 MG tablet; Take 1 tablet (160 mg total) by mouth every morning.  Dispense: 90 tablet; Refill: 3  -     nitroGLYCERIN (NITROSTAT) 0.4 MG SL tablet; Place 1 tablet (0.4 mg total) under the tongue every 5 (five) minutes as needed.  Dispense: 25 tablet; Refill: 0        Continue present meds  Continue f/u with cardiology as planned; discussed likely need for further cardiac evaluation due to recent chest pains episodes  Continue indomethacin PRN  Continue allopurinol daily  Continue oral B12 1,000mcg daily  Counseled on regular exercise, maintenance of a healthy weight, balanced diet rich in fruits/vegetables and lean protein, and avoidance of unhealthy habits like smoking and excessive alcohol intake.  Goal weight of 190    F/u 12 months with labs

## 2019-08-27 ENCOUNTER — OFFICE VISIT (OUTPATIENT)
Dept: CARDIOLOGY | Facility: CLINIC | Age: 54
End: 2019-08-27
Payer: COMMERCIAL

## 2019-08-27 VITALS
HEART RATE: 63 BPM | WEIGHT: 205.25 LBS | BODY MASS INDEX: 32.99 KG/M2 | DIASTOLIC BLOOD PRESSURE: 73 MMHG | SYSTOLIC BLOOD PRESSURE: 122 MMHG | HEIGHT: 66 IN

## 2019-08-27 DIAGNOSIS — E78.2 MIXED HYPERLIPIDEMIA: ICD-10-CM

## 2019-08-27 DIAGNOSIS — Z82.49 FAMILY HISTORY OF ISCHEMIC HEART DISEASE (IHD): ICD-10-CM

## 2019-08-27 DIAGNOSIS — Z98.61 HISTORY OF PTCA: Primary | Chronic | ICD-10-CM

## 2019-08-27 PROCEDURE — 3078F DIAST BP <80 MM HG: CPT | Mod: CPTII,S$GLB,, | Performed by: INTERNAL MEDICINE

## 2019-08-27 PROCEDURE — 3078F PR MOST RECENT DIASTOLIC BLOOD PRESSURE < 80 MM HG: ICD-10-PCS | Mod: CPTII,S$GLB,, | Performed by: INTERNAL MEDICINE

## 2019-08-27 PROCEDURE — 99214 PR OFFICE/OUTPT VISIT, EST, LEVL IV, 30-39 MIN: ICD-10-PCS | Mod: S$GLB,,, | Performed by: INTERNAL MEDICINE

## 2019-08-27 PROCEDURE — 3074F PR MOST RECENT SYSTOLIC BLOOD PRESSURE < 130 MM HG: ICD-10-PCS | Mod: CPTII,S$GLB,, | Performed by: INTERNAL MEDICINE

## 2019-08-27 PROCEDURE — 99214 OFFICE O/P EST MOD 30 MIN: CPT | Mod: S$GLB,,, | Performed by: INTERNAL MEDICINE

## 2019-08-27 PROCEDURE — 3074F SYST BP LT 130 MM HG: CPT | Mod: CPTII,S$GLB,, | Performed by: INTERNAL MEDICINE

## 2019-08-27 PROCEDURE — 3008F BODY MASS INDEX DOCD: CPT | Mod: CPTII,S$GLB,, | Performed by: INTERNAL MEDICINE

## 2019-08-27 PROCEDURE — 99999 PR PBB SHADOW E&M-EST. PATIENT-LVL III: ICD-10-PCS | Mod: PBBFAC,,, | Performed by: INTERNAL MEDICINE

## 2019-08-27 PROCEDURE — 3008F PR BODY MASS INDEX (BMI) DOCUMENTED: ICD-10-PCS | Mod: CPTII,S$GLB,, | Performed by: INTERNAL MEDICINE

## 2019-08-27 PROCEDURE — 99999 PR PBB SHADOW E&M-EST. PATIENT-LVL III: CPT | Mod: PBBFAC,,, | Performed by: INTERNAL MEDICINE

## 2019-08-27 RX ORDER — SODIUM CHLORIDE 9 MG/ML
INJECTION, SOLUTION INTRAVENOUS CONTINUOUS
Status: CANCELLED | OUTPATIENT
Start: 2019-08-27

## 2019-08-27 NOTE — PROGRESS NOTES
Subjective:    Patient ID:  Rafael Gautam Jr. is a 54 y.o. male who presents for follow-up of CAD F/U and Chest Pain      HPI  Here for follow up of CAD-PCI 2012. Patients states is doing well no chest pain, SOB or change in exertional tolerence. Patient does not exercise but remains very active with out change in exertional tolerance or chest pain.  Stable myalgias with current statin.     Review of Systems   Constitution: Negative for malaise/fatigue.   Eyes: Negative for blurred vision.   Cardiovascular: Negative for chest pain, claudication, cyanosis, dyspnea on exertion, irregular heartbeat, leg swelling, near-syncope, orthopnea, palpitations, paroxysmal nocturnal dyspnea and syncope.   Respiratory: Negative for cough and shortness of breath.    Hematologic/Lymphatic: Does not bruise/bleed easily.   Musculoskeletal: Negative for back pain, falls, joint pain, muscle cramps, muscle weakness and myalgias.   Gastrointestinal: Negative for abdominal pain, change in bowel habit, nausea and vomiting.   Genitourinary: Negative for urgency.   Neurological: Negative for dizziness, focal weakness and light-headedness.       Past Medical History:   Diagnosis Date    Anemia     B12 deficiency anemia     CAD (coronary artery disease)     Carotid atherosclerosis     Essential hypertension 7/8/2015    Family history of ischemic heart disease (IHD) 1/31/2012    Hemorrhoid     History of CVA (cerebrovascular accident) 5/9/2019    History of PTCA     (7/12 RCA BMS 2.75x12) 8/3/2012    LAD/CX- LI. RCA- occluded     HLD (hyperlipidemia)     Hypertension     Hyperuricemia     right foot and left wrist    Stroke 1/2012          Objective:     Vitals:    08/27/19 1148   BP: 122/73   Pulse: 63        Physical Exam   Constitutional: He is oriented to person, place, and time. He appears well-developed and well-nourished. He is cooperative.   HENT:   Head: Normocephalic and atraumatic.   Eyes: Conjunctivae are normal.  Right eye exhibits no exudate. Left eye exhibits no exudate.   Neck: Normal range of motion. Neck supple. Normal carotid pulses and no JVD present. Carotid bruit is not present. No thyromegaly present.   Cardiovascular: Normal rate, regular rhythm, normal heart sounds and intact distal pulses.   Pulses:       Carotid pulses are 2+ on the right side, and 2+ on the left side.       Radial pulses are 2+ on the right side, and 2+ on the left side.        Dorsalis pedis pulses are 2+ on the right side, and 2+ on the left side.        Posterior tibial pulses are 2+ on the right side, and 2+ on the left side.   Pulmonary/Chest: Effort normal and breath sounds normal.   Abdominal: Soft. Bowel sounds are normal.   Musculoskeletal: Normal range of motion. He exhibits no edema.   Neurological: He is alert and oriented to person, place, and time. Gait normal.   Skin: Skin is warm, dry and intact. No cyanosis. Nails show no clubbing.   Psychiatric: He has a normal mood and affect. His speech is normal and behavior is normal. Judgment and thought content normal.   Nursing note and vitals reviewed.            ..    Chemistry        Component Value Date/Time     08/17/2019 0745     08/17/2019 0745    K 4.3 08/17/2019 0745    K 4.3 08/17/2019 0745     08/17/2019 0745     08/17/2019 0745    CO2 30 (H) 08/17/2019 0745    CO2 30 (H) 08/17/2019 0745    BUN 13 08/17/2019 0745    BUN 13 08/17/2019 0745    CREATININE 0.9 08/17/2019 0745    CREATININE 0.9 08/17/2019 0745     08/17/2019 0745     08/17/2019 0745        Component Value Date/Time    CALCIUM 9.8 08/17/2019 0745    CALCIUM 9.8 08/17/2019 0745    ALKPHOS 63 08/17/2019 0745    ALKPHOS 63 08/17/2019 0745    AST 22 08/17/2019 0745    AST 22 08/17/2019 0745    ALT 26 08/17/2019 0745    ALT 26 08/17/2019 0745    BILITOT 0.4 08/17/2019 0745    BILITOT 0.4 08/17/2019 0745    ESTGFRAFRICA >60.0 08/17/2019 0745    ESTGFRAFRICA >60.0 08/17/2019 0745     EGFRNONAA >60.0 08/17/2019 0745    EGFRNONAA >60.0 08/17/2019 0745            ..  Lab Results   Component Value Date    CHOL 149 08/17/2019    CHOL 149 08/17/2019    CHOL 137 08/11/2018     Lab Results   Component Value Date    HDL 36 (L) 08/17/2019    HDL 36 (L) 08/17/2019    HDL 36 (L) 08/11/2018     Lab Results   Component Value Date    LDLCALC 79.4 08/17/2019    LDLCALC 79.4 08/17/2019    LDLCALC 69.6 08/11/2018     Lab Results   Component Value Date    TRIG 168 (H) 08/17/2019    TRIG 168 (H) 08/17/2019    TRIG 157 (H) 08/11/2018     Lab Results   Component Value Date    CHOLHDL 24.2 08/17/2019    CHOLHDL 24.2 08/17/2019    CHOLHDL 26.3 08/11/2018     ..  Lab Results   Component Value Date    WBC 6.85 08/17/2019    WBC 6.85 08/17/2019    HGB 14.1 08/17/2019    HGB 14.1 08/17/2019    HCT 44.3 08/17/2019    HCT 44.3 08/17/2019    MCV 94 08/17/2019    MCV 94 08/17/2019     08/17/2019     08/17/2019       Test(s) Reviewed  I have reviewed the following in detail:  [] Stress test   [] Angiography   [x] Echocardiogram   [x] Labs   [x] Other:       Assessment:         ICD-10-CM ICD-9-CM   1. History of PTCA     (7/12 RCA BMS 2.75x12) Z98.61 V45.82   2. Mixed hyperlipidemia E78.2 272.2   3. Family history of ischemic heart disease (IHD) Z82.49 V17.3     Problem List Items Addressed This Visit     HLD (hyperlipidemia)    History of PTCA     (7/12 RCA BMS 2.75x12) - Primary (Chronic)    Overview     LAD/CX- LI. RCA- occluded         Family history of ischemic heart disease (IHD)           Plan:           Return to clinic 9 months   Aerobic exercise 5x's/wk. Heart healthy diet and risk factor modification.    See labs and med orders.  [facility] rt radial access due to increasing IMER and now angina similar to previous (CCS3).      Portions of this note may have been created with voice recognition software.  Grammatical, syntax and spelling errors may be inevitable.

## 2019-08-27 NOTE — PATIENT INSTRUCTIONS
Angiogram    Arrive for procedure at: [facility] on Wednesday September 18th 2019. Procedure will start at 8 a.m.     You will receive a phone call from [facility] Pre-Op Department with arrival time and further instructions prior to your scheduled procedure.    Notify the nurse if you are ALLERGIC TO IODINE.    FASTING: You MAY NOT have anything to eat or drink AFTER MIDNIGHT the day before your procedure.     MEDICATIONS: You may take your regular morning medications with water. If there are any medications that you should not take, you will be instructed to hold them for that morning.    ? CARDIOLOGY PRE-PROCEDURE MEDICATION ORDERS:  ** Please hold any medications that are checked below:    HOLD   # OF DAYS TO HOLD  ? Coumadin   Consult with Coumadin Clinic   ? Xarelto    _DAY BEFORE & DAY OF_  ? Pradaxa  _ DAY BEFORE & DAY OF _  ? Eliquis   _ DAY BEFORE & DAY OF _  ? Metformin    Day before procedure & morning of procedure  ? Short acting insulin   Morning of procedure    CONTINUE the Following Medications   ? Plavix      ? Effient     ? Aspirin    WHAT TO EXPECT:    How long will the procedure take?  The procedure will take an average of 1 - 2 hours to perform.  After the procedure, you will need to lay flat for around 4 - 6 hours to minimize bleeding from the puncture site. If the wrist is accessed you will need to keep your arm still as instructed by the nurse.    When can I go home?  You may be able to be discharged home that same afternoon if there were no complications.  If you have one of the following: balloon; stent; pacemaker or defibrillator procedures, you may spend one night for observation.  Your doctor will determine your discharge based upon your progress.  The results of your procedure will be discussed with you before you are discharged.  Any further testing or procedures will be scheduled for you either before you leave or you will be instructed to call for a future  appointment.      TRANSPORTATION:  PLEASE ARRANGE TO HAVE SOMEONE DRIVE YOU HOME FOLLOWING YOUR PROCEDURE, YOU WILL NOT BE ALLOWED TO DRIVE.

## 2019-09-18 ENCOUNTER — TELEPHONE (OUTPATIENT)
Dept: CARDIOLOGY | Facility: CLINIC | Age: 54
End: 2019-09-18

## 2019-09-18 NOTE — TELEPHONE ENCOUNTER
----- Message from Sri Corrales sent at 9/18/2019  3:23 PM CDT -----  Contact: Evonne  Type:  Sooner Apoointment Request    Caller is requesting a sooner appointment.  Caller declined first available appointment listed below.  Caller will not accept being placed on the waitlist and is requesting a message be sent to doctor.    Name of Caller:  Patient's wife Evonne  When is the first available appointment?  01/28/2020  Symptoms:    Best Call Back Number:  [phone]  Additional Information:  Requesting a call back to confirm appointment,patient had angiogram today, requesting to see patient for f/u within 3 months.

## 2019-09-19 ENCOUNTER — TELEPHONE (OUTPATIENT)
Dept: CARDIOLOGY | Facility: CLINIC | Age: 54
End: 2019-09-19

## 2019-09-19 NOTE — TELEPHONE ENCOUNTER
----- Message from Austen Segovia sent at 9/19/2019  9:28 AM CDT -----  Contact: Evonne Gautam - Wife  Type:  Patient Returning Call    Who Called:  Evonne  Who Left Message for Patient:  Evonne Tian  Does the patient know what this is regarding?:  Scheduling 3 month follow up  Best Call Back Number:  [phone]  Additional Information:  Caller states to call her and not the patient. Please call to advise. Thanks!

## 2019-09-19 NOTE — TELEPHONE ENCOUNTER
Spoke with patient's wife scheduled appointment next available with Dr Renteria patient's wife agreed to appointment put patient on cancellation list..

## 2019-09-19 NOTE — TELEPHONE ENCOUNTER
----- Message from Austen Segovia sent at 9/19/2019  9:28 AM CDT -----  Contact: Evonne Gautam - Wife  Type:  Patient Returning Call    Who Called:  Evonne  Who Left Message for Patient:  Evonne Tian  Does the patient know what this is regarding?:  Scheduling 3 month follow up  Best Call Back Number:  [phone]  Additional Information:  Caller states to call her and not the patient. Please call to advise. Thanks!

## 2020-01-24 ENCOUNTER — PATIENT OUTREACH (OUTPATIENT)
Dept: ADMINISTRATIVE | Facility: OTHER | Age: 55
End: 2020-01-24

## 2020-01-28 ENCOUNTER — OFFICE VISIT (OUTPATIENT)
Dept: CARDIOLOGY | Facility: CLINIC | Age: 55
End: 2020-01-28
Payer: COMMERCIAL

## 2020-01-28 VITALS
BODY MASS INDEX: 33.24 KG/M2 | DIASTOLIC BLOOD PRESSURE: 71 MMHG | SYSTOLIC BLOOD PRESSURE: 113 MMHG | HEART RATE: 60 BPM | HEIGHT: 66 IN | WEIGHT: 206.81 LBS

## 2020-01-28 DIAGNOSIS — I25.10 CORONARY ARTERY DISEASE INVOLVING NATIVE CORONARY ARTERY OF NATIVE HEART WITHOUT ANGINA PECTORIS: ICD-10-CM

## 2020-01-28 DIAGNOSIS — Z98.61 HISTORY OF PTCA: Primary | Chronic | ICD-10-CM

## 2020-01-28 DIAGNOSIS — Z86.73 HISTORY OF CVA (CEREBROVASCULAR ACCIDENT): ICD-10-CM

## 2020-01-28 DIAGNOSIS — E78.2 MIXED HYPERLIPIDEMIA: ICD-10-CM

## 2020-01-28 DIAGNOSIS — Z82.49 FAMILY HISTORY OF ISCHEMIC HEART DISEASE (IHD): ICD-10-CM

## 2020-01-28 PROCEDURE — 99999 PR PBB SHADOW E&M-EST. PATIENT-LVL III: ICD-10-PCS | Mod: PBBFAC,,, | Performed by: INTERNAL MEDICINE

## 2020-01-28 PROCEDURE — 99999 PR PBB SHADOW E&M-EST. PATIENT-LVL III: CPT | Mod: PBBFAC,,, | Performed by: INTERNAL MEDICINE

## 2020-01-28 PROCEDURE — 3074F SYST BP LT 130 MM HG: CPT | Mod: CPTII,S$GLB,, | Performed by: INTERNAL MEDICINE

## 2020-01-28 PROCEDURE — 3008F PR BODY MASS INDEX (BMI) DOCUMENTED: ICD-10-PCS | Mod: CPTII,S$GLB,, | Performed by: INTERNAL MEDICINE

## 2020-01-28 PROCEDURE — 3074F PR MOST RECENT SYSTOLIC BLOOD PRESSURE < 130 MM HG: ICD-10-PCS | Mod: CPTII,S$GLB,, | Performed by: INTERNAL MEDICINE

## 2020-01-28 PROCEDURE — 3078F DIAST BP <80 MM HG: CPT | Mod: CPTII,S$GLB,, | Performed by: INTERNAL MEDICINE

## 2020-01-28 PROCEDURE — 3008F BODY MASS INDEX DOCD: CPT | Mod: CPTII,S$GLB,, | Performed by: INTERNAL MEDICINE

## 2020-01-28 PROCEDURE — 99214 OFFICE O/P EST MOD 30 MIN: CPT | Mod: S$GLB,,, | Performed by: INTERNAL MEDICINE

## 2020-01-28 PROCEDURE — 99214 PR OFFICE/OUTPT VISIT, EST, LEVL IV, 30-39 MIN: ICD-10-PCS | Mod: S$GLB,,, | Performed by: INTERNAL MEDICINE

## 2020-01-28 PROCEDURE — 3078F PR MOST RECENT DIASTOLIC BLOOD PRESSURE < 80 MM HG: ICD-10-PCS | Mod: CPTII,S$GLB,, | Performed by: INTERNAL MEDICINE

## 2020-01-28 NOTE — PROGRESS NOTES
Subjective:    Patient ID:  Rafael Gautam Jr. is a 54 y.o. male who presents for follow-up of Hyperlipidemia; Coronary Artery Disease; and History of PTCA (7/12 RCA BMS 2.75x12)      HPI  Here for follow up of CAD-PCI 2012. Doing well remains active, no angina.     Review of Systems   Constitution: Negative for malaise/fatigue.   Eyes: Negative for blurred vision.   Cardiovascular: Negative for chest pain, claudication, cyanosis, dyspnea on exertion, irregular heartbeat, leg swelling, near-syncope, orthopnea, palpitations, paroxysmal nocturnal dyspnea and syncope.   Respiratory: Negative for cough and shortness of breath.    Hematologic/Lymphatic: Does not bruise/bleed easily.   Musculoskeletal: Negative for back pain, falls, joint pain, muscle cramps, muscle weakness and myalgias.   Gastrointestinal: Negative for abdominal pain, change in bowel habit, nausea and vomiting.   Genitourinary: Negative for urgency.   Neurological: Negative for dizziness, focal weakness and light-headedness.       Past Medical History:   Diagnosis Date    Anemia     Anticoagulant long-term use     B12 deficiency anemia     CAD (coronary artery disease)     Carotid atherosclerosis     Essential hypertension 7/8/2015    Family history of ischemic heart disease (IHD) 1/31/2012    Hemorrhoid     History of CVA (cerebrovascular accident) 5/9/2019    History of PTCA     (7/12 RCA BMS 2.75x12) 8/3/2012    LAD/CX- LI. RCA- occluded     HLD (hyperlipidemia)     Hypertension     Hyperuricemia     right foot and left wrist    Sleep apnea     mild ---does not need a C-PAP    Stroke 1/2012     Patient Active Problem List   Diagnosis    Family history of ischemic heart disease (IHD)    History of PTCA     (7/12 RCA BMS 2.75x12)    HLD (hyperlipidemia)    CAD (coronary artery disease)    Gout, chronic    B12 deficiency anemia    Hyperuricemia    Colon cancer screening    History of CVA (cerebrovascular accident)         Objective:     Vitals:    01/28/20 1204   BP: 113/71   Pulse: 60        Physical Exam   Constitutional: He is oriented to person, place, and time. He appears well-developed and well-nourished. He is cooperative.   HENT:   Head: Normocephalic and atraumatic.   Eyes: Conjunctivae are normal. Right eye exhibits no exudate. Left eye exhibits no exudate.   Neck: Normal range of motion. Neck supple. Normal carotid pulses and no JVD present. Carotid bruit is not present. No thyromegaly present.   Cardiovascular: Normal rate, regular rhythm, normal heart sounds and intact distal pulses.   Pulses:       Carotid pulses are 2+ on the right side, and 2+ on the left side.       Radial pulses are 2+ on the right side, and 2+ on the left side.        Dorsalis pedis pulses are 2+ on the right side, and 2+ on the left side.        Posterior tibial pulses are 2+ on the right side, and 2+ on the left side.   Pulmonary/Chest: Effort normal and breath sounds normal.   Abdominal: Soft. Bowel sounds are normal.   Musculoskeletal: Normal range of motion. He exhibits no edema.   Neurological: He is alert and oriented to person, place, and time. Gait normal.   Skin: Skin is warm, dry and intact. No cyanosis. Nails show no clubbing.   Psychiatric: He has a normal mood and affect. His speech is normal and behavior is normal. Judgment and thought content normal.   Nursing note and vitals reviewed.            ..    Chemistry        Component Value Date/Time     08/17/2019 0745     08/17/2019 0745    K 4.3 08/17/2019 0745    K 4.3 08/17/2019 0745     08/17/2019 0745     08/17/2019 0745    CO2 30 (H) 08/17/2019 0745    CO2 30 (H) 08/17/2019 0745    BUN 13 08/17/2019 0745    BUN 13 08/17/2019 0745    CREATININE 0.9 08/17/2019 0745    CREATININE 0.9 08/17/2019 0745     08/17/2019 0745     08/17/2019 0745        Component Value Date/Time    CALCIUM 9.8 08/17/2019 0745    CALCIUM 9.8 08/17/2019 0745    ALKPHOS  63 08/17/2019 0745    ALKPHOS 63 08/17/2019 0745    AST 22 08/17/2019 0745    AST 22 08/17/2019 0745    ALT 26 08/17/2019 0745    ALT 26 08/17/2019 0745    BILITOT 0.4 08/17/2019 0745    BILITOT 0.4 08/17/2019 0745    ESTGFRAFRICA >60.0 08/17/2019 0745    ESTGFRAFRICA >60.0 08/17/2019 0745    EGFRNONAA >60.0 08/17/2019 0745    EGFRNONAA >60.0 08/17/2019 0745            ..  Lab Results   Component Value Date    CHOL 149 08/17/2019    CHOL 149 08/17/2019    CHOL 137 08/11/2018     Lab Results   Component Value Date    HDL 36 (L) 08/17/2019    HDL 36 (L) 08/17/2019    HDL 36 (L) 08/11/2018     Lab Results   Component Value Date    LDLCALC 79.4 08/17/2019    LDLCALC 79.4 08/17/2019    LDLCALC 69.6 08/11/2018     Lab Results   Component Value Date    TRIG 168 (H) 08/17/2019    TRIG 168 (H) 08/17/2019    TRIG 157 (H) 08/11/2018     Lab Results   Component Value Date    CHOLHDL 24.2 08/17/2019    CHOLHDL 24.2 08/17/2019    CHOLHDL 26.3 08/11/2018     ..  Lab Results   Component Value Date    WBC 6.85 08/17/2019    WBC 6.85 08/17/2019    HGB 14.1 08/17/2019    HGB 14.1 08/17/2019    HCT 44.3 08/17/2019    HCT 44.3 08/17/2019    MCV 94 08/17/2019    MCV 94 08/17/2019     08/17/2019     08/17/2019       Test(s) Reviewed  I have reviewed the following in detail:  [] Stress test   [] Angiography   [x] Echocardiogram   [x] Labs   [x] Other:       Assessment:         ICD-10-CM ICD-9-CM   1. History of PTCA     (7/12 RCA BMS 2.75x12) Z98.61 V45.82   2. History of CVA (cerebrovascular accident) Z86.73 V12.54   3. Family history of ischemic heart disease (IHD) Z82.49 V17.3   4. Mixed hyperlipidemia E78.2 272.2   5. Coronary artery disease involving native coronary artery of native heart without angina pectoris I25.10 414.01     Problem List Items Addressed This Visit     HLD (hyperlipidemia)    History of PTCA     (7/12 RCA BMS 2.75x12) - Primary (Chronic)    Overview     LAD/CX- LI. RCA- occluded         History of  CVA (cerebrovascular accident)    Family history of ischemic heart disease (IHD)    CAD (coronary artery disease)    Overview     8/19  Mild disease of the LAD and circumflex verified FFR as above.  Diastolic dysfunction.                Plan:           Return to clinic 9 months   Low level/low impact aerobic exercise 5x's/wk. Heart healthy diet and risk factor modification.    See labs and med orders.    Portions of this note may have been created with voice recognition software.  Grammatical, syntax and spelling errors may be inevitable.

## 2020-05-05 ENCOUNTER — PATIENT MESSAGE (OUTPATIENT)
Dept: ADMINISTRATIVE | Facility: HOSPITAL | Age: 55
End: 2020-05-05

## 2020-05-28 DIAGNOSIS — E79.0 HYPERURICEMIA: ICD-10-CM

## 2020-05-28 DIAGNOSIS — I10 ESSENTIAL HYPERTENSION: Chronic | ICD-10-CM

## 2020-05-28 DIAGNOSIS — I25.10 CORONARY ARTERY DISEASE INVOLVING NATIVE CORONARY ARTERY OF NATIVE HEART WITHOUT ANGINA PECTORIS: ICD-10-CM

## 2020-05-28 DIAGNOSIS — E78.2 MIXED HYPERLIPIDEMIA: ICD-10-CM

## 2020-05-29 RX ORDER — CLOPIDOGREL BISULFATE 75 MG/1
75 TABLET ORAL DAILY
Qty: 90 TABLET | Refills: 0 | Status: SHIPPED | OUTPATIENT
Start: 2020-05-29 | End: 2020-10-12 | Stop reason: SDUPTHER

## 2020-05-29 RX ORDER — EZETIMIBE 10 MG/1
10 TABLET ORAL DAILY
Qty: 90 TABLET | Refills: 0 | Status: SHIPPED | OUTPATIENT
Start: 2020-05-29 | End: 2020-10-12 | Stop reason: SDUPTHER

## 2020-05-29 RX ORDER — ROSUVASTATIN CALCIUM 10 MG/1
10 TABLET, COATED ORAL NIGHTLY
Qty: 90 TABLET | Refills: 0 | Status: SHIPPED | OUTPATIENT
Start: 2020-05-29 | End: 2020-10-12 | Stop reason: SDUPTHER

## 2020-05-29 RX ORDER — ALLOPURINOL 300 MG/1
300 TABLET ORAL DAILY
Qty: 90 TABLET | Refills: 0 | Status: SHIPPED | OUTPATIENT
Start: 2020-05-29 | End: 2020-10-12 | Stop reason: SDUPTHER

## 2020-05-29 RX ORDER — AMLODIPINE BESYLATE 2.5 MG/1
2.5 TABLET ORAL NIGHTLY
Qty: 90 TABLET | Refills: 0 | Status: SHIPPED | OUTPATIENT
Start: 2020-05-29 | End: 2020-10-12 | Stop reason: SDUPTHER

## 2020-05-29 NOTE — PROGRESS NOTES
Refill Authorization Note    is requesting a refill authorization.    Brief assessment and rationale for refill: APPROVE: prr          Medication Therapy Plan: Uric acid completed 8/19; Approve 3 more allopurinol    Medication reconciliation completed: No                         Comments:      Requested Prescriptions   Signed Prescriptions Disp Refills    allopurinoL (ZYLOPRIM) 300 MG tablet 90 tablet 0     Sig: Take 1 tablet (300 mg total) by mouth once daily.       Endocrinology:  Gout Agents - allopurinol Failed - 5/28/2020  2:19 PM        Failed - Uric Acid is 5.9 or below and within 360 days     Uric Acid   Date Value Ref Range Status   08/17/2019 6.2 3.4 - 7.0 mg/dL Final   08/11/2018 5.7 3.4 - 7.0 mg/dL Final   08/19/2017 6.6 3.4 - 7.0 mg/dL Final              Passed - Patient is at least 18 years old        Passed - Office visit in past 12 months or future 90 days.     Recent Outpatient Visits            4 months ago History of PTCA     (7/12 RCA BMS 2.75x12)    [facility] Clem Renteria MD    9 months ago History of PTCA     (7/12 RCA BMS 2.75x12)    [facility] Clem Renteria MD    9 months ago Preventative health care    [facility] Pj Church MD    1 year ago Fever, unspecified fever cause    [facility] Rika Bright, NP    1 year ago Mixed hyperlipidemia    [facility] Pj Church MD          Future Appointments              In 4 months LAB, [place] [facility]    In 4 months ECHO, [facility]    In 4 months [facility]                Passed - Cr is 1.3 or below and within 360 days     Creatinine   Date Value Ref Range Status   08/17/2019 0.9 0.5 - 1.4 mg/dL Final   08/17/2019 0.9 0.5 - 1.4 mg/dL Final   08/11/2018 0.9 0.5 - 1.4 mg/dL Final              Passed - WBC in normal range and within 360  days     WBC   Date Value Ref Range Status   08/17/2019 6.85 3.90 - 12.70 K/uL Final   08/17/2019 6.85 3.90 - 12.70 K/uL Final   08/11/2018 7.96 3.90 - 12.70 K/uL Final              Passed - RBC in normal range and within 360 days     RBC   Date Value Ref Range Status   08/17/2019 4.71 4.60 - 6.20 M/uL Final   08/17/2019 4.71 4.60 - 6.20 M/uL Final   08/11/2018 4.62 4.60 - 6.20 M/uL Final              Passed - HGB in normal range and within 360 days     Hemoglobin   Date Value Ref Range Status   08/17/2019 14.1 14.0 - 18.0 g/dL Final   08/17/2019 14.1 14.0 - 18.0 g/dL Final   08/11/2018 14.0 14.0 - 18.0 g/dL Final              Passed - HCT in normal range and within 360 days     Hematocrit   Date Value Ref Range Status   08/17/2019 44.3 40.0 - 54.0 % Final   08/17/2019 44.3 40.0 - 54.0 % Final   08/11/2018 42.8 40.0 - 54.0 % Final              Passed - PLT in normal range and within 360 days     Platelets   Date Value Ref Range Status   08/17/2019 278 150 - 350 K/uL Final   08/17/2019 278 150 - 350 K/uL Final   08/11/2018 271 150 - 350 K/uL Final              Passed - ALT is 94 or below and within 360 days     ALT   Date Value Ref Range Status   08/17/2019 26 10 - 44 U/L Final   08/17/2019 26 10 - 44 U/L Final   08/11/2018 27 10 - 44 U/L Final                Passed - AST is 54 or below and within 360 days     AST   Date Value Ref Range Status   08/17/2019 22 10 - 40 U/L Final   08/17/2019 22 10 - 40 U/L Final   08/11/2018 23 10 - 40 U/L Final              Passed - eGFR is 60 or above and within 360 days     eGFR if non    Date Value Ref Range Status   08/17/2019 >60.0 >60 mL/min/1.73 m^2 Final     Comment:     Calculation used to obtain the estimated glomerular filtration  rate (eGFR) is the CKD-EPI equation.      08/17/2019 >60.0 >60 mL/min/1.73 m^2 Final     Comment:     Calculation used to obtain the estimated glomerular filtration  rate (eGFR) is the CKD-EPI equation.      08/11/2018 >60.0 >60  mL/min/1.73 m^2 Final     Comment:     Calculation used to obtain the estimated glomerular filtration  rate (eGFR) is the CKD-EPI equation.        eGFR if    Date Value Ref Range Status   08/17/2019 >60.0 >60 mL/min/1.73 m^2 Final   08/17/2019 >60.0 >60 mL/min/1.73 m^2 Final   08/11/2018 >60.0 >60 mL/min/1.73 m^2 Final             clopidogreL (PLAVIX) 75 mg tablet 90 tablet 0     Sig: Take 1 tablet (75 mg total) by mouth once daily.       Hematology: Antiplatelets - clopidogrel Passed - 5/28/2020  2:19 PM        Passed - Patient is at least 18 years old        Passed - No contraindicated proton pump inhibitors on active medication list        Passed - Office visit in past 12 months or future 90 days.     Recent Outpatient Visits            4 months ago History of PTCA     (7/12 RCA BMS 2.75x12)    [facility] Clem Renteria MD    9 months ago History of PTCA     (7/12 RCA BMS 2.75x12)    [facility] Clem Renteria MD    9 months ago Preventative health care    [facility] Pj Church MD    1 year ago Fever, unspecified fever cause    [facility] Rika Bright, NP    1 year ago Mixed hyperlipidemia    [facility] Pj Church MD          Future Appointments              In 4 months LAB, [place] [facility] [place]    In 4 months ECHO, [facility]    In 4 months Clem Renteria MD [facility]                Passed - HCT in normal range and within 360 days     Hematocrit   Date Value Ref Range Status   08/17/2019 44.3 40.0 - 54.0 % Final   08/17/2019 44.3 40.0 - 54.0 % Final   08/11/2018 42.8 40.0 - 54.0 % Final              Passed - HGB in normal range and within 360 days     Hemoglobin   Date Value Ref Range Status   08/17/2019 14.1 14.0 - 18.0 g/dL Final   08/17/2019 14.1 14.0 - 18.0 g/dL Final   08/11/2018 14.0 14.0 - 18.0 g/dL Final               Passed - PLT in normal range and within 360 days     Platelets   Date Value Ref Range Status   08/17/2019 278 150 - 350 K/uL Final   08/17/2019 278 150 - 350 K/uL Final   08/11/2018 271 150 - 350 K/uL Final             rosuvastatin (CRESTOR) 10 MG tablet 90 tablet 0     Sig: Take 1 tablet (10 mg total) by mouth every evening.       Cardiovascular:  Antilipid - Statins Passed - 5/28/2020  2:19 PM        Passed - Patient is at least 18 years old        Passed - Office visit in past 12 months or future 90 days.     Recent Outpatient Visits            4 months ago History of PTCA     (7/12 RCA BMS 2.75x12)    [facility] Clem Renteria MD    9 months ago History of PTCA     (7/12 RCA BMS 2.75x12)    [facility] Clem Renteria MD    9 months ago Preventative health care    [facility] Pj Church MD    1 year ago Fever, unspecified fever cause    [facility] Rkia Bright, NP    1 year ago Mixed hyperlipidemia    [facility] Pj Church MD          Future Appointments              In 4 months LAB, [place] [facility]    In 4 months ECHO, [facility]    In 4 months [facility]                Passed - Lipid Panel completed in last 360 days     Lab Results   Component Value Date    CHOL 149 08/17/2019    CHOL 149 08/17/2019    HDL 36 (L) 08/17/2019    HDL 36 (L) 08/17/2019    LDLCALC 79.4 08/17/2019    LDLCALC 79.4 08/17/2019    TRIG 168 (H) 08/17/2019    TRIG 168 (H) 08/17/2019             Passed - ALT is 94 or below and within 360 days     ALT   Date Value Ref Range Status   08/17/2019 26 10 - 44 U/L Final   08/17/2019 26 10 - 44 U/L Final   08/11/2018 27 10 - 44 U/L Final              Passed - AST is 54 or below and within 360 days     AST   Date Value Ref Range Status   08/17/2019 22 10 - 40 U/L Final   08/17/2019  22 10 - 40 U/L Final   08/11/2018 23 10 - 40 U/L Final             ezetimibe (ZETIA) 10 mg tablet 90 tablet 0     Sig: Take 1 tablet (10 mg total) by mouth once daily.       Cardiovascular:  Antilipid - Sterol Transport Inhibitors Passed - 5/28/2020  2:19 PM        Passed - Patient is at least 18 years old        Passed - Office visit in past 12 months or future 90 days.     Recent Outpatient Visits            4 months ago History of PTCA     (7/12 RCA BMS 2.75x12)    [facility] Clem Renteria MD    9 months ago History of PTCA     (7/12 RCA BMS 2.75x12)    [facility] Clem Renteria MD    9 months ago Preventative health care    [facility] Pj Church MD    1 year ago Fever, unspecified fever cause    [facility] Rika Bright NP    1 year ago Mixed hyperlipidemia    [facility] Pj Church MD          Future Appointments              In 4 months LAB, [place] [facility] [place]    In 4 months ECHO, [facility]    In 4 months Celm Renteria MD [facility]                Passed - Lipid Panel completed in last 360 days     Lab Results   Component Value Date    CHOL 149 08/17/2019    CHOL 149 08/17/2019    HDL 36 (L) 08/17/2019    HDL 36 (L) 08/17/2019    LDLCALC 79.4 08/17/2019    LDLCALC 79.4 08/17/2019    TRIG 168 (H) 08/17/2019    TRIG 168 (H) 08/17/2019            amLODIPine (NORVASC) 2.5 MG tablet 90 tablet 0     Sig: Take 1 tablet (2.5 mg total) by mouth every evening.       Cardiovascular:  Calcium Channel Blockers Passed - 5/28/2020  2:19 PM        Passed - Patient is at least 18 years old        Passed - Last BP in normal range within 360 days.     BP Readings from Last 3 Encounters:   01/28/20 113/71   09/18/19 117/70   08/27/19 122/73              Passed - Office visit in past 12 months or future 90 days.     Recent Outpatient Visits             4 months ago History of PTCA     (7/12 RCA BMS 2.75x12)    [facility] Clem Renteria MD    9 months ago History of PTCA     (7/12 RCA BMS 2.75x12)    [facility] Clem Renteria MD    9 months ago Preventative health care    [facility] Pj Church MD    1 year ago Fever, unspecified fever cause    [facility] Rika Bright, NP    1 year ago Mixed hyperlipidemia    [facility] Pj Church MD          Future Appointments              In 4 months LAB, [place] [facility] [place]    In 4 months ECHO, [facility]    In 4 months Clem Renteria MD [facility]                 Appointments  past 12m or future 3m with PCP    Date Provider   Last Visit   8/21/2019 Pj Church MD   Next Visit   Visit date not found Pj Church MD   ED visits in past 90 days: 0     Note composed:7:47 AM 05/29/2020

## 2020-09-28 ENCOUNTER — LAB VISIT (OUTPATIENT)
Dept: LAB | Facility: HOSPITAL | Age: 55
End: 2020-09-28
Attending: INTERNAL MEDICINE
Payer: COMMERCIAL

## 2020-09-28 ENCOUNTER — CLINICAL SUPPORT (OUTPATIENT)
Dept: CARDIOLOGY | Facility: CLINIC | Age: 55
End: 2020-09-28
Attending: INTERNAL MEDICINE
Payer: COMMERCIAL

## 2020-09-28 VITALS — HEIGHT: 66 IN | BODY MASS INDEX: 33.11 KG/M2 | WEIGHT: 206 LBS

## 2020-09-28 DIAGNOSIS — Z86.73 HISTORY OF CVA (CEREBROVASCULAR ACCIDENT): ICD-10-CM

## 2020-09-28 DIAGNOSIS — Z98.61 HISTORY OF PTCA: Chronic | ICD-10-CM

## 2020-09-28 DIAGNOSIS — Z98.61 HISTORY OF PTCA: ICD-10-CM

## 2020-09-28 DIAGNOSIS — E78.2 MIXED HYPERLIPIDEMIA: ICD-10-CM

## 2020-09-28 DIAGNOSIS — Z82.49 FAMILY HISTORY OF ISCHEMIC HEART DISEASE (IHD): ICD-10-CM

## 2020-09-28 DIAGNOSIS — Z12.5 PROSTATE CANCER SCREENING: ICD-10-CM

## 2020-09-28 DIAGNOSIS — I25.10 CORONARY ARTERY DISEASE INVOLVING NATIVE CORONARY ARTERY OF NATIVE HEART WITHOUT ANGINA PECTORIS: ICD-10-CM

## 2020-09-28 LAB
ALBUMIN SERPL BCP-MCNC: 4.4 G/DL (ref 3.5–5.2)
ALP SERPL-CCNC: 57 U/L (ref 55–135)
ALT SERPL W/O P-5'-P-CCNC: 26 U/L (ref 10–44)
ANION GAP SERPL CALC-SCNC: 9 MMOL/L (ref 8–16)
AST SERPL-CCNC: 22 U/L (ref 10–40)
BASOPHILS # BLD AUTO: 0.03 K/UL (ref 0–0.2)
BASOPHILS NFR BLD: 0.4 % (ref 0–1.9)
BILIRUB SERPL-MCNC: 0.5 MG/DL (ref 0.1–1)
BUN SERPL-MCNC: 14 MG/DL (ref 6–20)
CALCIUM SERPL-MCNC: 9.5 MG/DL (ref 8.7–10.5)
CHLORIDE SERPL-SCNC: 105 MMOL/L (ref 95–110)
CHOLEST SERPL-MCNC: 129 MG/DL (ref 120–199)
CHOLEST/HDLC SERPL: 3.6 {RATIO} (ref 2–5)
CO2 SERPL-SCNC: 27 MMOL/L (ref 23–29)
COMPLEXED PSA SERPL-MCNC: 0.5 NG/ML (ref 0–4)
CREAT SERPL-MCNC: 1 MG/DL (ref 0.5–1.4)
DIFFERENTIAL METHOD: ABNORMAL
EOSINOPHIL # BLD AUTO: 0.2 K/UL (ref 0–0.5)
EOSINOPHIL NFR BLD: 2.3 % (ref 0–8)
ERYTHROCYTE [DISTWIDTH] IN BLOOD BY AUTOMATED COUNT: 13 % (ref 11.5–14.5)
EST. GFR  (AFRICAN AMERICAN): >60 ML/MIN/1.73 M^2
EST. GFR  (NON AFRICAN AMERICAN): >60 ML/MIN/1.73 M^2
GLUCOSE SERPL-MCNC: 103 MG/DL (ref 70–110)
HCT VFR BLD AUTO: 42.9 % (ref 40–54)
HDLC SERPL-MCNC: 36 MG/DL (ref 40–75)
HDLC SERPL: 27.9 % (ref 20–50)
HGB BLD-MCNC: 13.8 G/DL (ref 14–18)
IMM GRANULOCYTES # BLD AUTO: 0.01 K/UL (ref 0–0.04)
IMM GRANULOCYTES NFR BLD AUTO: 0.1 % (ref 0–0.5)
LDLC SERPL CALC-MCNC: 77.4 MG/DL (ref 63–159)
LYMPHOCYTES # BLD AUTO: 3.4 K/UL (ref 1–4.8)
LYMPHOCYTES NFR BLD: 43.4 % (ref 18–48)
MCH RBC QN AUTO: 29.9 PG (ref 27–31)
MCHC RBC AUTO-ENTMCNC: 32.2 G/DL (ref 32–36)
MCV RBC AUTO: 93 FL (ref 82–98)
MONOCYTES # BLD AUTO: 0.6 K/UL (ref 0.3–1)
MONOCYTES NFR BLD: 8 % (ref 4–15)
NEUTROPHILS # BLD AUTO: 3.6 K/UL (ref 1.8–7.7)
NEUTROPHILS NFR BLD: 45.8 % (ref 38–73)
NONHDLC SERPL-MCNC: 93 MG/DL
NRBC BLD-RTO: 0 /100 WBC
PLATELET # BLD AUTO: 291 K/UL (ref 150–350)
PMV BLD AUTO: 9.3 FL (ref 9.2–12.9)
POTASSIUM SERPL-SCNC: 4.4 MMOL/L (ref 3.5–5.1)
PROT SERPL-MCNC: 7.3 G/DL (ref 6–8.4)
RBC # BLD AUTO: 4.62 M/UL (ref 4.6–6.2)
SODIUM SERPL-SCNC: 141 MMOL/L (ref 136–145)
TRIGL SERPL-MCNC: 78 MG/DL (ref 30–150)
WBC # BLD AUTO: 7.78 K/UL (ref 3.9–12.7)

## 2020-09-28 PROCEDURE — 99999 PR PBB SHADOW E&M-EST. PATIENT-LVL I: ICD-10-PCS | Mod: PBBFAC,,,

## 2020-09-28 PROCEDURE — 80053 COMPREHEN METABOLIC PANEL: CPT

## 2020-09-28 PROCEDURE — 80061 LIPID PANEL: CPT

## 2020-09-28 PROCEDURE — 85025 COMPLETE CBC W/AUTO DIFF WBC: CPT

## 2020-09-28 PROCEDURE — 84153 ASSAY OF PSA TOTAL: CPT

## 2020-09-28 PROCEDURE — 93306 ECHO (CUPID ONLY): ICD-10-PCS | Mod: S$GLB,,, | Performed by: INTERNAL MEDICINE

## 2020-09-28 PROCEDURE — 93306 TTE W/DOPPLER COMPLETE: CPT | Mod: S$GLB,,, | Performed by: INTERNAL MEDICINE

## 2020-09-28 PROCEDURE — 99999 PR PBB SHADOW E&M-EST. PATIENT-LVL I: CPT | Mod: PBBFAC,,,

## 2020-09-28 PROCEDURE — 36415 COLL VENOUS BLD VENIPUNCTURE: CPT | Mod: PO

## 2020-09-29 LAB
ASCENDING AORTA: 3.03 CM
AV INDEX (PROSTH): 0.99
AV MEAN GRADIENT: 2 MMHG
AV PEAK GRADIENT: 3 MMHG
AV VALVE AREA: 3.66 CM2
AV VELOCITY RATIO: 1.1
BSA FOR ECHO PROCEDURE: 2.09 M2
CV ECHO LV RWT: 0.31 CM
DOP CALC AO PEAK VEL: 0.9 M/S
DOP CALC AO VTI: 21.32 CM
DOP CALC LVOT AREA: 3.7 CM2
DOP CALC LVOT DIAMETER: 2.17 CM
DOP CALC LVOT PEAK VEL: 0.99 M/S
DOP CALC LVOT STROKE VOLUME: 78.03 CM3
DOP CALCLVOT PEAK VEL VTI: 21.11 CM
E WAVE DECELERATION TIME: 185.25 MSEC
E/A RATIO: 1.9
E/E' RATIO: 5.9 M/S
ECHO LV POSTERIOR WALL: 0.96 CM (ref 0.6–1.1)
FRACTIONAL SHORTENING: 38 % (ref 28–44)
INTERVENTRICULAR SEPTUM: 1.03 CM (ref 0.6–1.1)
IVRT: 87.54 MSEC
LA MAJOR: 5.29 CM
LA MINOR: 4.59 CM
LA WIDTH: 3.92 CM
LEFT ATRIUM SIZE: 3.95 CM
LEFT ATRIUM VOLUME INDEX: 31.9 ML/M2
LEFT ATRIUM VOLUME: 64.69 CM3
LEFT INTERNAL DIMENSION IN SYSTOLE: 3.81 CM (ref 2.1–4)
LEFT VENTRICLE DIASTOLIC VOLUME INDEX: 95.52 ML/M2
LEFT VENTRICLE DIASTOLIC VOLUME: 193.46 ML
LEFT VENTRICLE MASS INDEX: 128 G/M2
LEFT VENTRICLE SYSTOLIC VOLUME INDEX: 30.7 ML/M2
LEFT VENTRICLE SYSTOLIC VOLUME: 62.2 ML
LEFT VENTRICULAR INTERNAL DIMENSION IN DIASTOLE: 6.19 CM (ref 3.5–6)
LEFT VENTRICULAR MASS: 258.66 G
LV LATERAL E/E' RATIO: 5.36 M/S
LV SEPTAL E/E' RATIO: 6.56 M/S
MV PEAK A VEL: 0.31 M/S
MV PEAK E VEL: 0.59 M/S
MV STENOSIS PRESSURE HALF TIME: 53.72 MS
MV VALVE AREA P 1/2 METHOD: 4.1 CM2
PULM VEIN S/D RATIO: 1.14
PV PEAK D VEL: 0.42 M/S
PV PEAK S VEL: 0.48 M/S
RA MAJOR: 4.64 CM
RA PRESSURE: 3 MMHG
RA WIDTH: 4.4 CM
RIGHT VENTRICULAR END-DIASTOLIC DIMENSION: 3.34 CM
SINUS: 3.13 CM
STJ: 2.95 CM
TDI LATERAL: 0.11 M/S
TDI SEPTAL: 0.09 M/S
TDI: 0.1 M/S
TRICUSPID ANNULAR PLANE SYSTOLIC EXCURSION: 2.45 CM

## 2020-10-05 ENCOUNTER — PATIENT OUTREACH (OUTPATIENT)
Dept: ADMINISTRATIVE | Facility: OTHER | Age: 55
End: 2020-10-05

## 2020-10-05 NOTE — PROGRESS NOTES
LINKS immunization registry updated  Care Everywhere updated  Health Maintenance updated  Chart reviewed for overdue [facility] ([facility]) health maintenance testing (CRS, Breast Ca, Diabetic Eye Exam)   Orders entered:N/A

## 2020-10-06 ENCOUNTER — OFFICE VISIT (OUTPATIENT)
Dept: CARDIOLOGY | Facility: CLINIC | Age: 55
End: 2020-10-06
Payer: COMMERCIAL

## 2020-10-06 VITALS
HEART RATE: 65 BPM | WEIGHT: 206.13 LBS | BODY MASS INDEX: 33.13 KG/M2 | SYSTOLIC BLOOD PRESSURE: 127 MMHG | DIASTOLIC BLOOD PRESSURE: 79 MMHG | HEIGHT: 66 IN

## 2020-10-06 DIAGNOSIS — E78.2 MIXED HYPERLIPIDEMIA: ICD-10-CM

## 2020-10-06 DIAGNOSIS — Z98.61 HISTORY OF PTCA: Primary | Chronic | ICD-10-CM

## 2020-10-06 DIAGNOSIS — Z86.73 HISTORY OF CVA (CEREBROVASCULAR ACCIDENT): ICD-10-CM

## 2020-10-06 DIAGNOSIS — Z82.49 FAMILY HISTORY OF ISCHEMIC HEART DISEASE (IHD): ICD-10-CM

## 2020-10-06 DIAGNOSIS — I25.10 CORONARY ARTERY DISEASE INVOLVING NATIVE CORONARY ARTERY OF NATIVE HEART WITHOUT ANGINA PECTORIS: ICD-10-CM

## 2020-10-06 PROCEDURE — 99214 PR OFFICE/OUTPT VISIT, EST, LEVL IV, 30-39 MIN: ICD-10-PCS | Mod: S$GLB,,, | Performed by: INTERNAL MEDICINE

## 2020-10-06 PROCEDURE — 99999 PR PBB SHADOW E&M-EST. PATIENT-LVL III: CPT | Mod: PBBFAC,,, | Performed by: INTERNAL MEDICINE

## 2020-10-06 PROCEDURE — 3008F PR BODY MASS INDEX (BMI) DOCUMENTED: ICD-10-PCS | Mod: CPTII,S$GLB,, | Performed by: INTERNAL MEDICINE

## 2020-10-06 PROCEDURE — 3074F SYST BP LT 130 MM HG: CPT | Mod: CPTII,S$GLB,, | Performed by: INTERNAL MEDICINE

## 2020-10-06 PROCEDURE — 99999 PR PBB SHADOW E&M-EST. PATIENT-LVL III: ICD-10-PCS | Mod: PBBFAC,,, | Performed by: INTERNAL MEDICINE

## 2020-10-06 PROCEDURE — 3078F DIAST BP <80 MM HG: CPT | Mod: CPTII,S$GLB,, | Performed by: INTERNAL MEDICINE

## 2020-10-06 PROCEDURE — 3078F PR MOST RECENT DIASTOLIC BLOOD PRESSURE < 80 MM HG: ICD-10-PCS | Mod: CPTII,S$GLB,, | Performed by: INTERNAL MEDICINE

## 2020-10-06 PROCEDURE — 3008F BODY MASS INDEX DOCD: CPT | Mod: CPTII,S$GLB,, | Performed by: INTERNAL MEDICINE

## 2020-10-06 PROCEDURE — 3074F PR MOST RECENT SYSTOLIC BLOOD PRESSURE < 130 MM HG: ICD-10-PCS | Mod: CPTII,S$GLB,, | Performed by: INTERNAL MEDICINE

## 2020-10-06 PROCEDURE — 99214 OFFICE O/P EST MOD 30 MIN: CPT | Mod: S$GLB,,, | Performed by: INTERNAL MEDICINE

## 2020-10-06 NOTE — PROGRESS NOTES
Subjective:    Patient ID:  Rafael Gautam Jr. is a 55 y.o. male who presents for follow-up of No chief complaint on file.      HPI  Here for follow up of CAD-PCI 2012. Doing well remains active, no angina. Still working in tire shop.     Review of Systems   Constitution: Negative for malaise/fatigue.   Eyes: Negative for blurred vision.   Cardiovascular: Negative for chest pain, claudication, cyanosis, dyspnea on exertion, irregular heartbeat, leg swelling, near-syncope, orthopnea, palpitations, paroxysmal nocturnal dyspnea and syncope.   Respiratory: Negative for cough and shortness of breath.    Hematologic/Lymphatic: Does not bruise/bleed easily.   Musculoskeletal: Negative for back pain, falls, joint pain, muscle cramps, muscle weakness and myalgias.   Gastrointestinal: Negative for abdominal pain, change in bowel habit, nausea and vomiting.   Genitourinary: Negative for urgency.   Neurological: Negative for dizziness, focal weakness and light-headedness.       Past Medical History:   Diagnosis Date    Anemia     Anticoagulant long-term use     B12 deficiency anemia     CAD (coronary artery disease)     Carotid atherosclerosis     Essential hypertension 7/8/2015    Family history of ischemic heart disease (IHD) 1/31/2012    Hemorrhoid     History of CVA (cerebrovascular accident) 5/9/2019    History of PTCA     (7/12 RCA BMS 2.75x12) 8/3/2012    LAD/CX- LI. RCA- occluded     HLD (hyperlipidemia)     Hypertension     Hyperuricemia     right foot and left wrist    Sleep apnea     mild ---does not need a C-PAP    Stroke 1/2012     Patient Active Problem List   Diagnosis    Family history of ischemic heart disease (IHD)    History of PTCA     (7/12 RCA BMS 2.75x12)    HLD (hyperlipidemia)    CAD (coronary artery disease)    Gout, chronic    B12 deficiency anemia    Hyperuricemia    Colon cancer screening    History of CVA (cerebrovascular accident)        Objective:     There were no  vitals filed for this visit.     Physical Exam   Constitutional: He is oriented to person, place, and time. He appears well-developed and well-nourished. He is cooperative.   HENT:   Head: Normocephalic and atraumatic.   Eyes: Conjunctivae are normal. Right eye exhibits no exudate. Left eye exhibits no exudate.   Neck: Normal range of motion. Neck supple. Normal carotid pulses and no JVD present. Carotid bruit is not present. No thyromegaly present.   Cardiovascular: Normal rate, regular rhythm, normal heart sounds and intact distal pulses.   Pulses:       Carotid pulses are 2+ on the right side and 2+ on the left side.       Radial pulses are 2+ on the right side and 2+ on the left side.        Dorsalis pedis pulses are 2+ on the right side and 2+ on the left side.        Posterior tibial pulses are 2+ on the right side and 2+ on the left side.   Pulmonary/Chest: Effort normal and breath sounds normal.   Abdominal: Soft. Bowel sounds are normal.   Musculoskeletal: Normal range of motion.         General: No edema.   Neurological: He is alert and oriented to person, place, and time. Gait normal.   Skin: Skin is warm, dry and intact. No cyanosis. Nails show no clubbing.   Psychiatric: He has a normal mood and affect. His speech is normal and behavior is normal. Judgment and thought content normal.   Nursing note and vitals reviewed.            ..    Chemistry        Component Value Date/Time     09/28/2020 1012    K 4.4 09/28/2020 1012     09/28/2020 1012    CO2 27 09/28/2020 1012    BUN 14 09/28/2020 1012    CREATININE 1.0 09/28/2020 1012     09/28/2020 1012        Component Value Date/Time    CALCIUM 9.5 09/28/2020 1012    ALKPHOS 57 09/28/2020 1012    AST 22 09/28/2020 1012    ALT 26 09/28/2020 1012    BILITOT 0.5 09/28/2020 1012    ESTGFRAFRICA >60.0 09/28/2020 1012    EGFRNONAA >60.0 09/28/2020 1012            ..  Lab Results   Component Value Date    CHOL 129 09/28/2020    CHOL 149 08/17/2019     CHOL 149 08/17/2019     Lab Results   Component Value Date    HDL 36 (L) 09/28/2020    HDL 36 (L) 08/17/2019    HDL 36 (L) 08/17/2019     Lab Results   Component Value Date    LDLCALC 77.4 09/28/2020    LDLCALC 79.4 08/17/2019    LDLCALC 79.4 08/17/2019     Lab Results   Component Value Date    TRIG 78 09/28/2020    TRIG 168 (H) 08/17/2019    TRIG 168 (H) 08/17/2019     Lab Results   Component Value Date    CHOLHDL 27.9 09/28/2020    CHOLHDL 24.2 08/17/2019    CHOLHDL 24.2 08/17/2019     ..  Lab Results   Component Value Date    WBC 7.78 09/28/2020    HGB 13.8 (L) 09/28/2020    HCT 42.9 09/28/2020    MCV 93 09/28/2020     09/28/2020       Test(s) Reviewed  I have reviewed the following in detail:  [] Stress test   [] Angiography   [x] Echocardiogram   [x] Labs   [x] Other:       Assessment:         ICD-10-CM ICD-9-CM   1. History of PTCA     (7/12 RCA BMS 2.75x12)  Z98.61 V45.82   2. Coronary artery disease involving native coronary artery of native heart without angina pectoris  I25.10 414.01   3. Mixed hyperlipidemia  E78.2 272.2   4. Family history of ischemic heart disease (IHD)  Z82.49 V17.3   5. History of CVA (cerebrovascular accident)  Z86.73 V12.54     Problem List Items Addressed This Visit     CAD (coronary artery disease)    Overview     8/19  Mild disease of the LAD and circumflex verified FFR as above.  Diastolic dysfunction.         Family history of ischemic heart disease (IHD)    History of CVA (cerebrovascular accident)    History of PTCA     (7/12 RCA BMS 2.75x12) - Primary (Chronic)    Overview     LAD/CX- LI. RCA- occluded         HLD (hyperlipidemia)           Plan:           Return to clinic 12 months   Low level/low impact aerobic exercise 5x's/wk. Heart healthy diet and risk factor modification.    See labs and med orders.    Portions of this note may have been created with voice recognition software.  Grammatical, syntax and spelling errors may be inevitable.

## 2020-10-12 ENCOUNTER — OFFICE VISIT (OUTPATIENT)
Dept: FAMILY MEDICINE | Facility: CLINIC | Age: 55
End: 2020-10-12
Payer: COMMERCIAL

## 2020-10-12 VITALS
HEIGHT: 66 IN | HEART RATE: 61 BPM | WEIGHT: 207.44 LBS | TEMPERATURE: 99 F | DIASTOLIC BLOOD PRESSURE: 74 MMHG | OXYGEN SATURATION: 97 % | BODY MASS INDEX: 33.34 KG/M2 | SYSTOLIC BLOOD PRESSURE: 128 MMHG

## 2020-10-12 DIAGNOSIS — I10 ESSENTIAL HYPERTENSION: Chronic | ICD-10-CM

## 2020-10-12 DIAGNOSIS — I25.10 CORONARY ARTERY DISEASE INVOLVING NATIVE CORONARY ARTERY OF NATIVE HEART WITHOUT ANGINA PECTORIS: ICD-10-CM

## 2020-10-12 DIAGNOSIS — E78.2 MIXED HYPERLIPIDEMIA: ICD-10-CM

## 2020-10-12 DIAGNOSIS — E79.0 HYPERURICEMIA: ICD-10-CM

## 2020-10-12 DIAGNOSIS — Z00.00 PREVENTATIVE HEALTH CARE: Primary | ICD-10-CM

## 2020-10-12 PROCEDURE — 99999 PR PBB SHADOW E&M-EST. PATIENT-LVL III: ICD-10-PCS | Mod: PBBFAC,,, | Performed by: FAMILY MEDICINE

## 2020-10-12 PROCEDURE — 90471 FLU VACCINE (QUAD) GREATER THAN OR EQUAL TO 3YO PRESERVATIVE FREE IM: ICD-10-PCS | Mod: S$GLB,,, | Performed by: FAMILY MEDICINE

## 2020-10-12 PROCEDURE — 3078F DIAST BP <80 MM HG: CPT | Mod: CPTII,S$GLB,, | Performed by: FAMILY MEDICINE

## 2020-10-12 PROCEDURE — 99396 PR PREVENTIVE VISIT,EST,40-64: ICD-10-PCS | Mod: 25,S$GLB,, | Performed by: FAMILY MEDICINE

## 2020-10-12 PROCEDURE — 90686 IIV4 VACC NO PRSV 0.5 ML IM: CPT | Mod: S$GLB,,, | Performed by: FAMILY MEDICINE

## 2020-10-12 PROCEDURE — 99999 PR PBB SHADOW E&M-EST. PATIENT-LVL III: CPT | Mod: PBBFAC,,, | Performed by: FAMILY MEDICINE

## 2020-10-12 PROCEDURE — 90471 IMMUNIZATION ADMIN: CPT | Mod: S$GLB,,, | Performed by: FAMILY MEDICINE

## 2020-10-12 PROCEDURE — 3078F PR MOST RECENT DIASTOLIC BLOOD PRESSURE < 80 MM HG: ICD-10-PCS | Mod: CPTII,S$GLB,, | Performed by: FAMILY MEDICINE

## 2020-10-12 PROCEDURE — 3008F PR BODY MASS INDEX (BMI) DOCUMENTED: ICD-10-PCS | Mod: CPTII,S$GLB,, | Performed by: FAMILY MEDICINE

## 2020-10-12 PROCEDURE — 3074F SYST BP LT 130 MM HG: CPT | Mod: CPTII,S$GLB,, | Performed by: FAMILY MEDICINE

## 2020-10-12 PROCEDURE — 90686 FLU VACCINE (QUAD) GREATER THAN OR EQUAL TO 3YO PRESERVATIVE FREE IM: ICD-10-PCS | Mod: S$GLB,,, | Performed by: FAMILY MEDICINE

## 2020-10-12 PROCEDURE — 3074F PR MOST RECENT SYSTOLIC BLOOD PRESSURE < 130 MM HG: ICD-10-PCS | Mod: CPTII,S$GLB,, | Performed by: FAMILY MEDICINE

## 2020-10-12 PROCEDURE — 3008F BODY MASS INDEX DOCD: CPT | Mod: CPTII,S$GLB,, | Performed by: FAMILY MEDICINE

## 2020-10-12 PROCEDURE — 99396 PREV VISIT EST AGE 40-64: CPT | Mod: 25,S$GLB,, | Performed by: FAMILY MEDICINE

## 2020-10-12 RX ORDER — ROSUVASTATIN CALCIUM 10 MG/1
10 TABLET, COATED ORAL NIGHTLY
Qty: 90 TABLET | Refills: 3 | Status: SHIPPED | OUTPATIENT
Start: 2020-10-12 | End: 2021-11-11 | Stop reason: SDUPTHER

## 2020-10-12 RX ORDER — ALLOPURINOL 300 MG/1
300 TABLET ORAL DAILY
Qty: 90 TABLET | Refills: 3 | Status: SHIPPED | OUTPATIENT
Start: 2020-10-12 | End: 2021-12-27 | Stop reason: SDUPTHER

## 2020-10-12 RX ORDER — EZETIMIBE 10 MG/1
10 TABLET ORAL DAILY
Qty: 90 TABLET | Refills: 3 | Status: SHIPPED | OUTPATIENT
Start: 2020-10-12 | End: 2021-11-11 | Stop reason: SDUPTHER

## 2020-10-12 RX ORDER — CLOPIDOGREL BISULFATE 75 MG/1
75 TABLET ORAL DAILY
Qty: 90 TABLET | Refills: 3 | Status: SHIPPED | OUTPATIENT
Start: 2020-10-12 | End: 2021-11-11 | Stop reason: SDUPTHER

## 2020-10-12 RX ORDER — AMLODIPINE BESYLATE 2.5 MG/1
2.5 TABLET ORAL NIGHTLY
Qty: 90 TABLET | Refills: 3 | Status: SHIPPED | OUTPATIENT
Start: 2020-10-12 | End: 2021-11-11 | Stop reason: SDUPTHER

## 2020-10-12 NOTE — PROGRESS NOTES
Subjective:       Patient ID: Rafael Gautam Jr. is a 55 y.o. male.    Chief Complaint: Annual Exam    HPI Comments: Here today for annual exam and f/u on chronic health issues    He reports overall feeling well.  HTN - tolerating Diovan HCT  and amlodipine 2.5mg daily; home /70  HLD - tolerating Crestor 10mg and Zetia 10mg daily  CAD s/p cardiac stents - tolerating Aspirin 81mg, Plavix 75mg daily  Gout - uses indomethacin PRN flares; no flares in the last 6 months; He is taking allopurinol 300mg daily  b12 deficiency - stopped oral B12         Past Medical History:   Diagnosis Date    Anemia     Anticoagulant long-term use     B12 deficiency anemia     CAD (coronary artery disease)     Carotid atherosclerosis     Essential hypertension 7/8/2015    Family history of ischemic heart disease (IHD) 1/31/2012    Hemorrhoid     History of CVA (cerebrovascular accident) 5/9/2019    History of PTCA     (7/12 RCA BMS 2.75x12) 8/3/2012    LAD/CX- LI. RCA- occluded     HLD (hyperlipidemia)     Hypertension     Hyperuricemia     right foot and left wrist    Sleep apnea     mild ---does not need a C-PAP    Stroke 1/2012     Current Outpatient Medications on File Prior to Visit   Medication Sig Dispense Refill    aspirin 81 mg Tab Take 81 mg by mouth once daily.        nitroGLYCERIN (NITROSTAT) 0.4 MG SL tablet Place 1 tablet (0.4 mg total) under the tongue every 5 (five) minutes as needed. 25 tablet 0    [DISCONTINUED] allopurinoL (ZYLOPRIM) 300 MG tablet Take 1 tablet (300 mg total) by mouth once daily. 90 tablet 0    [DISCONTINUED] amLODIPine (NORVASC) 2.5 MG tablet Take 1 tablet (2.5 mg total) by mouth every evening. 90 tablet 0    [DISCONTINUED] clopidogreL (PLAVIX) 75 mg tablet Take 1 tablet (75 mg total) by mouth once daily. 90 tablet 0    [DISCONTINUED] ezetimibe (ZETIA) 10 mg tablet Take 1 tablet (10 mg total) by mouth once daily. 90 tablet 0    [DISCONTINUED] rosuvastatin (CRESTOR) 10 MG  "tablet Take 1 tablet (10 mg total) by mouth every evening. 90 tablet 0    valsartan-hydrochlorothiazide (DIOVAN-HCT) 80-12.5 mg per tablet Take 1 tablet by mouth once daily. 90 tablet 3     No current facility-administered medications on file prior to visit.        Review of Systems   Constitutional: Negative for fever, appetite change and unexpected weight change.   HENT: Negative for ear pain, nosebleeds, congestion, sore throat, rhinorrhea, trouble swallowing, neck pain, postnasal drip, sinus pressure and ear discharge.    Eyes: Negative for pain, discharge, redness and visual disturbance.   Respiratory: Negative for apnea, cough, chest tightness, shortness of breath and wheezing.    Cardiovascular: Negative for chest pain, palpitations and leg swelling.   Gastrointestinal: Negative for nausea, vomiting, abdominal pain, diarrhea, constipation, blood in stool and abdominal distention.   Genitourinary: Negative for dysuria, urgency, frequency, hematuria, flank pain, scrotal swelling, difficulty urinating and testicular pain.   Skin: Negative for color change, rash and wound.   Neurological: Negative for dizziness, tremors, seizures, syncope, speech difficulty, weakness, light-headedness and numbness.   Hematological: Negative for adenopathy. Does not bruise/bleed easily.   Psychiatric/Behavioral: Negative for suicidal ideas, hallucinations, behavioral problems and confusion.   Intermittent right knee pains      Objective:      /74 (BP Location: Right arm, Patient Position: Sitting)   Pulse 61   Temp 98.5 °F (36.9 °C) (Oral)   Ht 5' 6" (1.676 m)   Wt 94.1 kg (207 lb 7.3 oz)   SpO2 97%   BMI 33.48 kg/m²   Physical Exam   Constitutional: He is oriented to person, place, and time. He appears well-developed and well-nourished. He is active and cooperative.   Neck: Trachea normal, normal range of motion and full passive range of motion without pain. Neck supple. Normal carotid pulses and no JVD present. " Carotid bruit is not present. No tracheal deviation present. No mass and no thyromegaly present.   Cardiovascular: Normal rate, regular rhythm, S1 normal, S2 normal, normal heart sounds and intact distal pulses.  Exam reveals no gallop and no friction rub.    No murmur heard.  Pulmonary/Chest: Effort normal and breath sounds normal. No respiratory distress. He has no wheezes. He has no rales.   Abdominal: Soft. Normal appearance and bowel sounds are normal. He exhibits no distension and no mass. There is no hepatosplenomegaly. There is no tenderness. There is no rebound and no guarding.   Musculoskeletal: Normal range of motion. He exhibits no edema and no tenderness.   Neurological: He is alert and oriented to person, place, and time. He has normal strength. No cranial nerve deficit. Coordination normal.   Skin: Skin is warm, dry and intact. No rash noted. No cyanosis or erythema. No pallor. Nails show no clubbing.   Psychiatric: He has a normal mood and affect. His speech is normal and behavior is normal. Judgment and thought content normal.   Right lower leg varicosities and 1+ edema.        Results for orders placed or performed in visit on 09/28/20   Echo Color Flow Doppler? Yes   Result Value Ref Range    BSA 2.09 m2    TDI SEPTAL 0.09 m/s    LV LATERAL E/E' RATIO 5.36 m/s    LV SEPTAL E/E' RATIO 6.56 m/s    LA WIDTH 3.92 cm    TDI LATERAL 0.11 m/s    LVIDd 6.19 (A) 3.5 - 6.0 cm    IVS 1.03 0.6 - 1.1 cm    Posterior Wall 0.96 0.6 - 1.1 cm    LVIDs 3.81 2.1 - 4.0 cm    FS 38 28 - 44 %    LA volume 64.69 cm3    Sinus 3.13 cm    STJ 2.95 cm    Ascending aorta 3.03 cm    LV mass 258.66 g    LA size 3.95 cm    RVDD 3.34 cm    TAPSE 2.45 cm    Left Ventricle Relative Wall Thickness 0.31 cm    AV mean gradient 2 mmHg    AV valve area 3.66 cm2    AV Velocity Ratio 1.10     AV index (prosthetic) 0.99     MV valve area p 1/2 method 4.10 cm2    E/A ratio 1.90     Mean e' 0.10 m/s    E wave decelartion time 185.25 msec     IVRT 87.54 msec    Pulm vein S/D ratio 1.14     LVOT diameter 2.17 cm    LVOT area 3.7 cm2    LVOT peak donald 0.99 m/s    LVOT peak VTI 21.11 cm    Ao peak donald 0.90 m/s    Ao VTI 21.32 cm    LVOT stroke volume 78.03 cm3    AV peak gradient 3 mmHg    E/E' ratio 5.90 m/s    MV Peak E Donald 0.59 m/s    MV stenosis pressure 1/2 time 53.72 ms    MV Peak A Donald 0.31 m/s    PV Peak S Donald 0.48 m/s    PV Peak D Donald 0.42 m/s    LV Systolic Volume 62.20 mL    LV Systolic Volume Index 30.7 mL/m2    LV Diastolic Volume 193.46 mL    LV Diastolic Volume Index 95.52 mL/m2    LA Volume Index 31.9 mL/m2    LV Mass Index 128 g/m2    RA Major Axis 4.64 cm    Left Atrium Minor Axis 4.59 cm    Left Atrium Major Axis 5.29 cm    RA Width 4.40 cm    Right Atrial Pressure (from IVC) 3 mmHg       Assessment:       1. Preventative health care    2. Coronary artery disease involving native coronary artery of native heart without angina pectoris    3. Essential hypertension    4. Hyperuricemia    5. Mixed hyperlipidemia        Plan:       Preventative health care    Coronary artery disease involving native coronary artery of native heart without angina pectoris  -     clopidogreL (PLAVIX) 75 mg tablet; Take 1 tablet (75 mg total) by mouth once daily.  Dispense: 90 tablet; Refill: 3    Essential hypertension  -     amLODIPine (NORVASC) 2.5 MG tablet; Take 1 tablet (2.5 mg total) by mouth every evening.  Dispense: 90 tablet; Refill: 3    Hyperuricemia  -     allopurinoL (ZYLOPRIM) 300 MG tablet; Take 1 tablet (300 mg total) by mouth once daily.  Dispense: 90 tablet; Refill: 3    Mixed hyperlipidemia  -     ezetimibe (ZETIA) 10 mg tablet; Take 1 tablet (10 mg total) by mouth once daily.  Dispense: 90 tablet; Refill: 3  -     rosuvastatin (CRESTOR) 10 MG tablet; Take 1 tablet (10 mg total) by mouth every evening.  Dispense: 90 tablet; Refill: 3        Continue present meds  Continue f/u with cardiology as planned; discussed likely need for further  cardiac evaluation due to recent chest pains episodes  Continue indomethacin PRN  Continue allopurinol daily  Continue oral B12 1,000mcg daily  Counseled on regular exercise, maintenance of a healthy weight, balanced diet rich in fruits/vegetables and lean protein, and avoidance of unhealthy habits like smoking and excessive alcohol intake.  Goal weight of 190  Flu shot  meds refilled  F/u 12 months with labs

## 2021-02-17 ENCOUNTER — HOSPITAL ENCOUNTER (OUTPATIENT)
Dept: RADIOLOGY | Facility: HOSPITAL | Age: 56
Discharge: HOME | End: 2021-02-17
Attending: NURSE PRACTITIONER
Payer: COMMERCIAL

## 2021-02-17 ENCOUNTER — OFFICE VISIT (OUTPATIENT)
Dept: FAMILY MEDICINE | Facility: CLINIC | Age: 56
End: 2021-02-17
Payer: COMMERCIAL

## 2021-02-17 VITALS
HEART RATE: 67 BPM | WEIGHT: 212.75 LBS | OXYGEN SATURATION: 97 % | DIASTOLIC BLOOD PRESSURE: 84 MMHG | BODY MASS INDEX: 34.34 KG/M2 | TEMPERATURE: 99 F | SYSTOLIC BLOOD PRESSURE: 126 MMHG

## 2021-02-17 DIAGNOSIS — M25.511 ACUTE PAIN OF RIGHT SHOULDER: ICD-10-CM

## 2021-02-17 DIAGNOSIS — M25.511 ACUTE PAIN OF RIGHT SHOULDER: Primary | ICD-10-CM

## 2021-02-17 PROCEDURE — 73030 XR SHOULDER COMPLETE 2 OR MORE VIEWS RIGHT: ICD-10-PCS | Mod: 26,RT,, | Performed by: RADIOLOGY

## 2021-02-17 PROCEDURE — 99214 OFFICE O/P EST MOD 30 MIN: CPT | Mod: S$GLB,,, | Performed by: NURSE PRACTITIONER

## 2021-02-17 PROCEDURE — 99999 PR PBB SHADOW E&M-EST. PATIENT-LVL IV: CPT | Mod: PBBFAC,,, | Performed by: NURSE PRACTITIONER

## 2021-02-17 PROCEDURE — 3079F PR MOST RECENT DIASTOLIC BLOOD PRESSURE 80-89 MM HG: ICD-10-PCS | Mod: CPTII,S$GLB,, | Performed by: NURSE PRACTITIONER

## 2021-02-17 PROCEDURE — 99214 PR OFFICE/OUTPT VISIT, EST, LEVL IV, 30-39 MIN: ICD-10-PCS | Mod: S$GLB,,, | Performed by: NURSE PRACTITIONER

## 2021-02-17 PROCEDURE — 1125F PR PAIN SEVERITY QUANTIFIED, PAIN PRESENT: ICD-10-PCS | Mod: S$GLB,,, | Performed by: NURSE PRACTITIONER

## 2021-02-17 PROCEDURE — 3079F DIAST BP 80-89 MM HG: CPT | Mod: CPTII,S$GLB,, | Performed by: NURSE PRACTITIONER

## 2021-02-17 PROCEDURE — 3008F BODY MASS INDEX DOCD: CPT | Mod: CPTII,S$GLB,, | Performed by: NURSE PRACTITIONER

## 2021-02-17 PROCEDURE — 99999 PR PBB SHADOW E&M-EST. PATIENT-LVL IV: ICD-10-PCS | Mod: PBBFAC,,, | Performed by: NURSE PRACTITIONER

## 2021-02-17 PROCEDURE — 3074F PR MOST RECENT SYSTOLIC BLOOD PRESSURE < 130 MM HG: ICD-10-PCS | Mod: CPTII,S$GLB,, | Performed by: NURSE PRACTITIONER

## 2021-02-17 PROCEDURE — 1125F AMNT PAIN NOTED PAIN PRSNT: CPT | Mod: S$GLB,,, | Performed by: NURSE PRACTITIONER

## 2021-02-17 PROCEDURE — 73030 X-RAY EXAM OF SHOULDER: CPT | Mod: TC,FY,PO,RT

## 2021-02-17 PROCEDURE — 3074F SYST BP LT 130 MM HG: CPT | Mod: CPTII,S$GLB,, | Performed by: NURSE PRACTITIONER

## 2021-02-17 PROCEDURE — 3008F PR BODY MASS INDEX (BMI) DOCUMENTED: ICD-10-PCS | Mod: CPTII,S$GLB,, | Performed by: NURSE PRACTITIONER

## 2021-02-17 PROCEDURE — 73030 X-RAY EXAM OF SHOULDER: CPT | Mod: 26,RT,, | Performed by: RADIOLOGY

## 2021-02-18 ENCOUNTER — OFFICE VISIT (OUTPATIENT)
Dept: ORTHOPEDICS | Facility: CLINIC | Age: 56
End: 2021-02-18
Payer: COMMERCIAL

## 2021-02-18 VITALS — BODY MASS INDEX: 34.19 KG/M2 | HEIGHT: 66 IN | WEIGHT: 212.75 LBS

## 2021-02-18 DIAGNOSIS — M25.511 ACUTE PAIN OF RIGHT SHOULDER: ICD-10-CM

## 2021-02-18 PROCEDURE — 99243 OFF/OP CNSLTJ NEW/EST LOW 30: CPT | Mod: 25,S$GLB,, | Performed by: ORTHOPAEDIC SURGERY

## 2021-02-18 PROCEDURE — 3008F BODY MASS INDEX DOCD: CPT | Mod: CPTII,S$GLB,, | Performed by: ORTHOPAEDIC SURGERY

## 2021-02-18 PROCEDURE — 99999 PR PBB SHADOW E&M-EST. PATIENT-LVL III: ICD-10-PCS | Mod: PBBFAC,,, | Performed by: ORTHOPAEDIC SURGERY

## 2021-02-18 PROCEDURE — 1125F PR PAIN SEVERITY QUANTIFIED, PAIN PRESENT: ICD-10-PCS | Mod: S$GLB,,, | Performed by: ORTHOPAEDIC SURGERY

## 2021-02-18 PROCEDURE — 1125F AMNT PAIN NOTED PAIN PRSNT: CPT | Mod: S$GLB,,, | Performed by: ORTHOPAEDIC SURGERY

## 2021-02-18 PROCEDURE — 3008F PR BODY MASS INDEX (BMI) DOCUMENTED: ICD-10-PCS | Mod: CPTII,S$GLB,, | Performed by: ORTHOPAEDIC SURGERY

## 2021-02-18 PROCEDURE — 20610 DRAIN/INJ JOINT/BURSA W/O US: CPT | Mod: RT,S$GLB,, | Performed by: ORTHOPAEDIC SURGERY

## 2021-02-18 PROCEDURE — 99243 PR OFFICE CONSULTATION,LEVEL III: ICD-10-PCS | Mod: 25,S$GLB,, | Performed by: ORTHOPAEDIC SURGERY

## 2021-02-18 PROCEDURE — 20610 LARGE JOINT ASPIRATION/INJECTION: R SUBACROMIAL BURSA: ICD-10-PCS | Mod: RT,S$GLB,, | Performed by: ORTHOPAEDIC SURGERY

## 2021-02-18 PROCEDURE — 99999 PR PBB SHADOW E&M-EST. PATIENT-LVL III: CPT | Mod: PBBFAC,,, | Performed by: ORTHOPAEDIC SURGERY

## 2021-02-18 RX ORDER — TRIAMCINOLONE ACETONIDE 40 MG/ML
80 INJECTION, SUSPENSION INTRA-ARTICULAR; INTRAMUSCULAR
Status: DISCONTINUED | OUTPATIENT
Start: 2021-02-18 | End: 2021-02-18 | Stop reason: HOSPADM

## 2021-02-18 RX ADMIN — TRIAMCINOLONE ACETONIDE 80 MG: 40 INJECTION, SUSPENSION INTRA-ARTICULAR; INTRAMUSCULAR at 02:02

## 2021-02-22 ENCOUNTER — TELEPHONE (OUTPATIENT)
Dept: FAMILY MEDICINE | Facility: CLINIC | Age: 56
End: 2021-02-22

## 2021-06-29 ENCOUNTER — TELEPHONE (OUTPATIENT)
Dept: CARDIOLOGY | Facility: CLINIC | Age: 56
End: 2021-06-29

## 2021-07-21 ENCOUNTER — TELEPHONE (OUTPATIENT)
Dept: FAMILY MEDICINE | Facility: CLINIC | Age: 56
End: 2021-07-21

## 2021-07-21 ENCOUNTER — OFFICE VISIT (OUTPATIENT)
Dept: URGENT CARE | Facility: CLINIC | Age: 56
End: 2021-07-21
Payer: COMMERCIAL

## 2021-07-21 VITALS
DIASTOLIC BLOOD PRESSURE: 81 MMHG | TEMPERATURE: 99 F | RESPIRATION RATE: 19 BRPM | HEIGHT: 66 IN | OXYGEN SATURATION: 96 % | SYSTOLIC BLOOD PRESSURE: 134 MMHG | HEART RATE: 79 BPM | BODY MASS INDEX: 34.07 KG/M2 | WEIGHT: 212 LBS

## 2021-07-21 DIAGNOSIS — U07.1 COVID-19: Primary | ICD-10-CM

## 2021-07-21 DIAGNOSIS — U07.1 COVID-19 VIRUS DETECTED: ICD-10-CM

## 2021-07-21 DIAGNOSIS — R50.9 FEVER AND CHILLS: ICD-10-CM

## 2021-07-21 LAB
CTP QC/QA: YES
SARS-COV-2 RDRP RESP QL NAA+PROBE: POSITIVE

## 2021-07-21 PROCEDURE — 99214 OFFICE O/P EST MOD 30 MIN: CPT | Mod: S$GLB,,, | Performed by: PHYSICIAN ASSISTANT

## 2021-07-21 PROCEDURE — 3075F PR MOST RECENT SYSTOLIC BLOOD PRESS GE 130-139MM HG: ICD-10-PCS | Mod: CPTII,S$GLB,, | Performed by: PHYSICIAN ASSISTANT

## 2021-07-21 PROCEDURE — U0002: ICD-10-PCS | Mod: QW,CR,S$GLB, | Performed by: PHYSICIAN ASSISTANT

## 2021-07-21 PROCEDURE — 3008F BODY MASS INDEX DOCD: CPT | Mod: CPTII,S$GLB,, | Performed by: PHYSICIAN ASSISTANT

## 2021-07-21 PROCEDURE — U0002 COVID-19 LAB TEST NON-CDC: HCPCS | Mod: QW,CR,S$GLB, | Performed by: PHYSICIAN ASSISTANT

## 2021-07-21 PROCEDURE — 3079F DIAST BP 80-89 MM HG: CPT | Mod: CPTII,S$GLB,, | Performed by: PHYSICIAN ASSISTANT

## 2021-07-21 PROCEDURE — 3008F PR BODY MASS INDEX (BMI) DOCUMENTED: ICD-10-PCS | Mod: CPTII,S$GLB,, | Performed by: PHYSICIAN ASSISTANT

## 2021-07-21 PROCEDURE — 99214 PR OFFICE/OUTPT VISIT, EST, LEVL IV, 30-39 MIN: ICD-10-PCS | Mod: S$GLB,,, | Performed by: PHYSICIAN ASSISTANT

## 2021-07-21 PROCEDURE — 3079F PR MOST RECENT DIASTOLIC BLOOD PRESSURE 80-89 MM HG: ICD-10-PCS | Mod: CPTII,S$GLB,, | Performed by: PHYSICIAN ASSISTANT

## 2021-07-21 PROCEDURE — 3075F SYST BP GE 130 - 139MM HG: CPT | Mod: CPTII,S$GLB,, | Performed by: PHYSICIAN ASSISTANT

## 2021-07-21 RX ORDER — BENZONATATE 200 MG/1
200 CAPSULE ORAL 3 TIMES DAILY PRN
Qty: 30 CAPSULE | Refills: 0 | Status: SHIPPED | OUTPATIENT
Start: 2021-07-21 | End: 2021-07-31

## 2021-07-27 ENCOUNTER — NURSE TRIAGE (OUTPATIENT)
Dept: ADMINISTRATIVE | Facility: CLINIC | Age: 56
End: 2021-07-27

## 2021-07-27 ENCOUNTER — TELEPHONE (OUTPATIENT)
Dept: FAMILY MEDICINE | Facility: CLINIC | Age: 56
End: 2021-07-27

## 2021-11-06 ENCOUNTER — LAB VISIT (OUTPATIENT)
Dept: LAB | Facility: HOSPITAL | Age: 56
End: 2021-11-06
Attending: INTERNAL MEDICINE
Payer: COMMERCIAL

## 2021-11-06 DIAGNOSIS — E78.2 MIXED HYPERLIPIDEMIA: ICD-10-CM

## 2021-11-06 DIAGNOSIS — Z82.49 FAMILY HISTORY OF ISCHEMIC HEART DISEASE (IHD): ICD-10-CM

## 2021-11-06 DIAGNOSIS — Z98.61 HISTORY OF PTCA: Chronic | ICD-10-CM

## 2021-11-06 DIAGNOSIS — I25.10 CORONARY ARTERY DISEASE INVOLVING NATIVE CORONARY ARTERY OF NATIVE HEART WITHOUT ANGINA PECTORIS: ICD-10-CM

## 2021-11-06 LAB
ALBUMIN SERPL BCP-MCNC: 4.1 G/DL (ref 3.5–5.2)
ALP SERPL-CCNC: 57 U/L (ref 55–135)
ALT SERPL W/O P-5'-P-CCNC: 30 U/L (ref 10–44)
ANION GAP SERPL CALC-SCNC: 7 MMOL/L (ref 8–16)
AST SERPL-CCNC: 24 U/L (ref 10–40)
BASOPHILS # BLD AUTO: 0.04 K/UL (ref 0–0.2)
BASOPHILS NFR BLD: 0.5 % (ref 0–1.9)
BILIRUB SERPL-MCNC: 0.4 MG/DL (ref 0.1–1)
BUN SERPL-MCNC: 15 MG/DL (ref 6–20)
CALCIUM SERPL-MCNC: 9.8 MG/DL (ref 8.7–10.5)
CHLORIDE SERPL-SCNC: 104 MMOL/L (ref 95–110)
CHOLEST SERPL-MCNC: 164 MG/DL (ref 120–199)
CHOLEST/HDLC SERPL: 5 {RATIO} (ref 2–5)
CO2 SERPL-SCNC: 28 MMOL/L (ref 23–29)
CREAT SERPL-MCNC: 0.9 MG/DL (ref 0.5–1.4)
DIFFERENTIAL METHOD: ABNORMAL
EOSINOPHIL # BLD AUTO: 0.3 K/UL (ref 0–0.5)
EOSINOPHIL NFR BLD: 3.7 % (ref 0–8)
ERYTHROCYTE [DISTWIDTH] IN BLOOD BY AUTOMATED COUNT: 13.2 % (ref 11.5–14.5)
EST. GFR  (AFRICAN AMERICAN): >60 ML/MIN/1.73 M^2
EST. GFR  (NON AFRICAN AMERICAN): >60 ML/MIN/1.73 M^2
GLUCOSE SERPL-MCNC: 104 MG/DL (ref 70–110)
HCT VFR BLD AUTO: 44.3 % (ref 40–54)
HDLC SERPL-MCNC: 33 MG/DL (ref 40–75)
HDLC SERPL: 20.1 % (ref 20–50)
HGB BLD-MCNC: 13.9 G/DL (ref 14–18)
IMM GRANULOCYTES # BLD AUTO: 0.02 K/UL (ref 0–0.04)
IMM GRANULOCYTES NFR BLD AUTO: 0.2 % (ref 0–0.5)
LDLC SERPL CALC-MCNC: 78.8 MG/DL (ref 63–159)
LYMPHOCYTES # BLD AUTO: 3.8 K/UL (ref 1–4.8)
LYMPHOCYTES NFR BLD: 45.6 % (ref 18–48)
MCH RBC QN AUTO: 29.4 PG (ref 27–31)
MCHC RBC AUTO-ENTMCNC: 31.4 G/DL (ref 32–36)
MCV RBC AUTO: 94 FL (ref 82–98)
MONOCYTES # BLD AUTO: 0.8 K/UL (ref 0.3–1)
MONOCYTES NFR BLD: 9.5 % (ref 4–15)
NEUTROPHILS # BLD AUTO: 3.4 K/UL (ref 1.8–7.7)
NEUTROPHILS NFR BLD: 40.5 % (ref 38–73)
NONHDLC SERPL-MCNC: 131 MG/DL
NRBC BLD-RTO: 0 /100 WBC
PLATELET # BLD AUTO: 290 K/UL (ref 150–450)
PMV BLD AUTO: 9 FL (ref 9.2–12.9)
POTASSIUM SERPL-SCNC: 4.3 MMOL/L (ref 3.5–5.1)
PROT SERPL-MCNC: 7.2 G/DL (ref 6–8.4)
RBC # BLD AUTO: 4.72 M/UL (ref 4.6–6.2)
SODIUM SERPL-SCNC: 139 MMOL/L (ref 136–145)
TRIGL SERPL-MCNC: 261 MG/DL (ref 30–150)
WBC # BLD AUTO: 8.39 K/UL (ref 3.9–12.7)

## 2021-11-06 PROCEDURE — 80053 COMPREHEN METABOLIC PANEL: CPT | Performed by: INTERNAL MEDICINE

## 2021-11-06 PROCEDURE — 85025 COMPLETE CBC W/AUTO DIFF WBC: CPT | Performed by: INTERNAL MEDICINE

## 2021-11-06 PROCEDURE — 80061 LIPID PANEL: CPT | Performed by: INTERNAL MEDICINE

## 2021-11-06 PROCEDURE — 36415 COLL VENOUS BLD VENIPUNCTURE: CPT | Mod: PO | Performed by: INTERNAL MEDICINE

## 2021-11-11 ENCOUNTER — OFFICE VISIT (OUTPATIENT)
Dept: CARDIOLOGY | Facility: CLINIC | Age: 56
End: 2021-11-11
Payer: COMMERCIAL

## 2021-11-11 VITALS
HEIGHT: 66 IN | SYSTOLIC BLOOD PRESSURE: 123 MMHG | BODY MASS INDEX: 33.91 KG/M2 | HEART RATE: 75 BPM | WEIGHT: 211 LBS | DIASTOLIC BLOOD PRESSURE: 72 MMHG

## 2021-11-11 DIAGNOSIS — Z82.49 FAMILY HISTORY OF ISCHEMIC HEART DISEASE (IHD): ICD-10-CM

## 2021-11-11 DIAGNOSIS — E78.2 MIXED HYPERLIPIDEMIA: ICD-10-CM

## 2021-11-11 DIAGNOSIS — M1A.9XX0 CHRONIC GOUT WITHOUT TOPHUS, UNSPECIFIED CAUSE, UNSPECIFIED SITE: ICD-10-CM

## 2021-11-11 DIAGNOSIS — I25.10 CORONARY ARTERY DISEASE INVOLVING NATIVE CORONARY ARTERY OF NATIVE HEART WITHOUT ANGINA PECTORIS: ICD-10-CM

## 2021-11-11 DIAGNOSIS — Z98.61 HISTORY OF PTCA: Primary | Chronic | ICD-10-CM

## 2021-11-11 DIAGNOSIS — I10 ESSENTIAL HYPERTENSION: Chronic | ICD-10-CM

## 2021-11-11 DIAGNOSIS — Z86.73 HISTORY OF CVA (CEREBROVASCULAR ACCIDENT): ICD-10-CM

## 2021-11-11 PROCEDURE — 99214 PR OFFICE/OUTPT VISIT, EST, LEVL IV, 30-39 MIN: ICD-10-PCS | Mod: S$GLB,,, | Performed by: INTERNAL MEDICINE

## 2021-11-11 PROCEDURE — 3078F DIAST BP <80 MM HG: CPT | Mod: CPTII,S$GLB,, | Performed by: INTERNAL MEDICINE

## 2021-11-11 PROCEDURE — 3074F PR MOST RECENT SYSTOLIC BLOOD PRESSURE < 130 MM HG: ICD-10-PCS | Mod: CPTII,S$GLB,, | Performed by: INTERNAL MEDICINE

## 2021-11-11 PROCEDURE — 3008F BODY MASS INDEX DOCD: CPT | Mod: CPTII,S$GLB,, | Performed by: INTERNAL MEDICINE

## 2021-11-11 PROCEDURE — 99999 PR PBB SHADOW E&M-EST. PATIENT-LVL II: ICD-10-PCS | Mod: PBBFAC,,, | Performed by: INTERNAL MEDICINE

## 2021-11-11 PROCEDURE — 3078F PR MOST RECENT DIASTOLIC BLOOD PRESSURE < 80 MM HG: ICD-10-PCS | Mod: CPTII,S$GLB,, | Performed by: INTERNAL MEDICINE

## 2021-11-11 PROCEDURE — 3008F PR BODY MASS INDEX (BMI) DOCUMENTED: ICD-10-PCS | Mod: CPTII,S$GLB,, | Performed by: INTERNAL MEDICINE

## 2021-11-11 PROCEDURE — 99214 OFFICE O/P EST MOD 30 MIN: CPT | Mod: S$GLB,,, | Performed by: INTERNAL MEDICINE

## 2021-11-11 PROCEDURE — 3074F SYST BP LT 130 MM HG: CPT | Mod: CPTII,S$GLB,, | Performed by: INTERNAL MEDICINE

## 2021-11-11 PROCEDURE — 99999 PR PBB SHADOW E&M-EST. PATIENT-LVL II: CPT | Mod: PBBFAC,,, | Performed by: INTERNAL MEDICINE

## 2021-11-11 RX ORDER — CLOPIDOGREL BISULFATE 75 MG/1
75 TABLET ORAL DAILY
Qty: 90 TABLET | Refills: 3 | Status: SHIPPED | OUTPATIENT
Start: 2021-11-11 | End: 2022-11-22 | Stop reason: SDUPTHER

## 2021-11-11 RX ORDER — ROSUVASTATIN CALCIUM 10 MG/1
10 TABLET, COATED ORAL NIGHTLY
Qty: 90 TABLET | Refills: 3 | Status: SHIPPED | OUTPATIENT
Start: 2021-11-11 | End: 2022-11-22 | Stop reason: SDUPTHER

## 2021-11-11 RX ORDER — VALSARTAN AND HYDROCHLOROTHIAZIDE 80; 12.5 MG/1; MG/1
1 TABLET, FILM COATED ORAL DAILY
Qty: 90 TABLET | Refills: 4 | Status: SHIPPED | OUTPATIENT
Start: 2021-11-11 | End: 2023-03-22

## 2021-11-11 RX ORDER — AMLODIPINE BESYLATE 2.5 MG/1
2.5 TABLET ORAL NIGHTLY
Qty: 90 TABLET | Refills: 3 | Status: SHIPPED | OUTPATIENT
Start: 2021-11-11 | End: 2022-11-22 | Stop reason: SDUPTHER

## 2021-11-11 RX ORDER — EZETIMIBE 10 MG/1
10 TABLET ORAL DAILY
Qty: 90 TABLET | Refills: 3 | Status: SHIPPED | OUTPATIENT
Start: 2021-11-11 | End: 2022-11-22 | Stop reason: SDUPTHER

## 2021-12-27 DIAGNOSIS — E79.0 HYPERURICEMIA: ICD-10-CM

## 2021-12-27 RX ORDER — ALLOPURINOL 300 MG/1
300 TABLET ORAL DAILY
Qty: 90 TABLET | Refills: 3 | Status: SHIPPED | OUTPATIENT
Start: 2021-12-27 | End: 2022-11-22 | Stop reason: SDUPTHER

## 2022-05-31 ENCOUNTER — PATIENT MESSAGE (OUTPATIENT)
Dept: ADMINISTRATIVE | Facility: HOSPITAL | Age: 57
End: 2022-05-31
Payer: COMMERCIAL

## 2022-07-08 ENCOUNTER — TELEPHONE (OUTPATIENT)
Dept: CARDIOLOGY | Facility: CLINIC | Age: 57
End: 2022-07-08
Payer: COMMERCIAL

## 2022-08-02 ENCOUNTER — LAB VISIT (OUTPATIENT)
Dept: LAB | Facility: HOSPITAL | Age: 57
End: 2022-08-02
Attending: FAMILY MEDICINE
Payer: COMMERCIAL

## 2022-08-02 ENCOUNTER — OFFICE VISIT (OUTPATIENT)
Dept: FAMILY MEDICINE | Facility: CLINIC | Age: 57
End: 2022-08-02
Payer: COMMERCIAL

## 2022-08-02 VITALS
HEIGHT: 66 IN | BODY MASS INDEX: 33.66 KG/M2 | TEMPERATURE: 98 F | SYSTOLIC BLOOD PRESSURE: 120 MMHG | OXYGEN SATURATION: 98 % | HEART RATE: 61 BPM | WEIGHT: 209.44 LBS | DIASTOLIC BLOOD PRESSURE: 78 MMHG

## 2022-08-02 DIAGNOSIS — Z00.00 PREVENTATIVE HEALTH CARE: Primary | ICD-10-CM

## 2022-08-02 DIAGNOSIS — Z00.00 PREVENTATIVE HEALTH CARE: ICD-10-CM

## 2022-08-02 LAB
ALBUMIN SERPL BCP-MCNC: 4.1 G/DL (ref 3.5–5.2)
ALP SERPL-CCNC: 54 U/L (ref 55–135)
ALT SERPL W/O P-5'-P-CCNC: 23 U/L (ref 10–44)
ANION GAP SERPL CALC-SCNC: 6 MMOL/L (ref 8–16)
AST SERPL-CCNC: 17 U/L (ref 10–40)
BASOPHILS # BLD AUTO: 0.03 K/UL (ref 0–0.2)
BASOPHILS NFR BLD: 0.3 % (ref 0–1.9)
BILIRUB SERPL-MCNC: 0.4 MG/DL (ref 0.1–1)
BUN SERPL-MCNC: 14 MG/DL (ref 6–20)
CALCIUM SERPL-MCNC: 9.7 MG/DL (ref 8.7–10.5)
CHLORIDE SERPL-SCNC: 105 MMOL/L (ref 95–110)
CHOLEST SERPL-MCNC: 126 MG/DL (ref 120–199)
CHOLEST/HDLC SERPL: 3.7 {RATIO} (ref 2–5)
CK SERPL-CCNC: 79 U/L (ref 20–200)
CO2 SERPL-SCNC: 28 MMOL/L (ref 23–29)
COMPLEXED PSA SERPL-MCNC: 0.59 NG/ML (ref 0–4)
CREAT SERPL-MCNC: 0.8 MG/DL (ref 0.5–1.4)
DIFFERENTIAL METHOD: ABNORMAL
EOSINOPHIL # BLD AUTO: 0.2 K/UL (ref 0–0.5)
EOSINOPHIL NFR BLD: 2.8 % (ref 0–8)
ERYTHROCYTE [DISTWIDTH] IN BLOOD BY AUTOMATED COUNT: 13.4 % (ref 11.5–14.5)
EST. GFR  (NO RACE VARIABLE): >60 ML/MIN/1.73 M^2
GLUCOSE SERPL-MCNC: 99 MG/DL (ref 70–110)
HCT VFR BLD AUTO: 41.7 % (ref 40–54)
HDLC SERPL-MCNC: 34 MG/DL (ref 40–75)
HDLC SERPL: 27 % (ref 20–50)
HGB BLD-MCNC: 13.9 G/DL (ref 14–18)
IMM GRANULOCYTES # BLD AUTO: 0.03 K/UL (ref 0–0.04)
IMM GRANULOCYTES NFR BLD AUTO: 0.3 % (ref 0–0.5)
LDLC SERPL CALC-MCNC: 66.4 MG/DL (ref 63–159)
LYMPHOCYTES # BLD AUTO: 4 K/UL (ref 1–4.8)
LYMPHOCYTES NFR BLD: 46.9 % (ref 18–48)
MCH RBC QN AUTO: 30.8 PG (ref 27–31)
MCHC RBC AUTO-ENTMCNC: 33.3 G/DL (ref 32–36)
MCV RBC AUTO: 92 FL (ref 82–98)
MONOCYTES # BLD AUTO: 0.8 K/UL (ref 0.3–1)
MONOCYTES NFR BLD: 9.3 % (ref 4–15)
NEUTROPHILS # BLD AUTO: 3.5 K/UL (ref 1.8–7.7)
NEUTROPHILS NFR BLD: 40.4 % (ref 38–73)
NONHDLC SERPL-MCNC: 92 MG/DL
NRBC BLD-RTO: 0 /100 WBC
PLATELET # BLD AUTO: 277 K/UL (ref 150–450)
PMV BLD AUTO: 8.8 FL (ref 9.2–12.9)
POTASSIUM SERPL-SCNC: 4.6 MMOL/L (ref 3.5–5.1)
PROT SERPL-MCNC: 7.1 G/DL (ref 6–8.4)
RBC # BLD AUTO: 4.52 M/UL (ref 4.6–6.2)
SODIUM SERPL-SCNC: 139 MMOL/L (ref 136–145)
TRIGL SERPL-MCNC: 128 MG/DL (ref 30–150)
URATE SERPL-MCNC: 5.8 MG/DL (ref 3.4–7)
WBC # BLD AUTO: 8.6 K/UL (ref 3.9–12.7)

## 2022-08-02 PROCEDURE — 80061 LIPID PANEL: CPT | Performed by: FAMILY MEDICINE

## 2022-08-02 PROCEDURE — 86803 HEPATITIS C AB TEST: CPT | Performed by: FAMILY MEDICINE

## 2022-08-02 PROCEDURE — 3008F PR BODY MASS INDEX (BMI) DOCUMENTED: ICD-10-PCS | Mod: CPTII,S$GLB,, | Performed by: FAMILY MEDICINE

## 2022-08-02 PROCEDURE — 3074F PR MOST RECENT SYSTOLIC BLOOD PRESSURE < 130 MM HG: ICD-10-PCS | Mod: CPTII,S$GLB,, | Performed by: FAMILY MEDICINE

## 2022-08-02 PROCEDURE — 3008F BODY MASS INDEX DOCD: CPT | Mod: CPTII,S$GLB,, | Performed by: FAMILY MEDICINE

## 2022-08-02 PROCEDURE — 84550 ASSAY OF BLOOD/URIC ACID: CPT | Performed by: FAMILY MEDICINE

## 2022-08-02 PROCEDURE — 85025 COMPLETE CBC W/AUTO DIFF WBC: CPT | Performed by: FAMILY MEDICINE

## 2022-08-02 PROCEDURE — 82550 ASSAY OF CK (CPK): CPT | Performed by: FAMILY MEDICINE

## 2022-08-02 PROCEDURE — 80053 COMPREHEN METABOLIC PANEL: CPT | Performed by: FAMILY MEDICINE

## 2022-08-02 PROCEDURE — 99396 PR PREVENTIVE VISIT,EST,40-64: ICD-10-PCS | Mod: S$GLB,,, | Performed by: FAMILY MEDICINE

## 2022-08-02 PROCEDURE — 99999 PR PBB SHADOW E&M-EST. PATIENT-LVL IV: CPT | Mod: PBBFAC,,, | Performed by: FAMILY MEDICINE

## 2022-08-02 PROCEDURE — 99999 PR PBB SHADOW E&M-EST. PATIENT-LVL IV: ICD-10-PCS | Mod: PBBFAC,,, | Performed by: FAMILY MEDICINE

## 2022-08-02 PROCEDURE — 3078F DIAST BP <80 MM HG: CPT | Mod: CPTII,S$GLB,, | Performed by: FAMILY MEDICINE

## 2022-08-02 PROCEDURE — 3078F PR MOST RECENT DIASTOLIC BLOOD PRESSURE < 80 MM HG: ICD-10-PCS | Mod: CPTII,S$GLB,, | Performed by: FAMILY MEDICINE

## 2022-08-02 PROCEDURE — 84153 ASSAY OF PSA TOTAL: CPT | Performed by: FAMILY MEDICINE

## 2022-08-02 PROCEDURE — 36415 COLL VENOUS BLD VENIPUNCTURE: CPT | Mod: PO | Performed by: FAMILY MEDICINE

## 2022-08-02 PROCEDURE — 99396 PREV VISIT EST AGE 40-64: CPT | Mod: S$GLB,,, | Performed by: FAMILY MEDICINE

## 2022-08-02 PROCEDURE — 1159F MED LIST DOCD IN RCRD: CPT | Mod: CPTII,S$GLB,, | Performed by: FAMILY MEDICINE

## 2022-08-02 PROCEDURE — 3074F SYST BP LT 130 MM HG: CPT | Mod: CPTII,S$GLB,, | Performed by: FAMILY MEDICINE

## 2022-08-02 PROCEDURE — 1160F PR REVIEW ALL MEDS BY PRESCRIBER/CLIN PHARMACIST DOCUMENTED: ICD-10-PCS | Mod: CPTII,S$GLB,, | Performed by: FAMILY MEDICINE

## 2022-08-02 PROCEDURE — 1160F RVW MEDS BY RX/DR IN RCRD: CPT | Mod: CPTII,S$GLB,, | Performed by: FAMILY MEDICINE

## 2022-08-02 PROCEDURE — 1159F PR MEDICATION LIST DOCUMENTED IN MEDICAL RECORD: ICD-10-PCS | Mod: CPTII,S$GLB,, | Performed by: FAMILY MEDICINE

## 2022-08-02 NOTE — PROGRESS NOTES
Subjective:       Patient ID: Rafael Gautam Jr. is a 57 y.o. male.    Chief Complaint: Annual Exam    HPI Comments: Here today for annual exam and f/u on chronic health issues    He reports overall feeling well.  HTN - tolerating Diovan HCT  and amlodipine 2.5mg daily; home /70  HLD - tolerating Crestor 10mg and Zetia 10mg daily  CAD s/p cardiac stents - tolerating Aspirin 81mg, Plavix 75mg daily  Gout - uses indomethacin PRN flares; no flares in the last 6 months; He is taking allopurinol 300mg daily  b12 deficiency - stopped oral B12         Past Medical History:   Diagnosis Date    Anemia     Anticoagulant long-term use     B12 deficiency anemia     CAD (coronary artery disease)     Carotid atherosclerosis     Essential hypertension 7/8/2015    Family history of ischemic heart disease (IHD) 1/31/2012    Hemorrhoid     History of CVA (cerebrovascular accident) 5/9/2019    History of PTCA     (7/12 RCA BMS 2.75x12) 8/3/2012    LAD/CX- LI. RCA- occluded     HLD (hyperlipidemia)     Hypertension     Hyperuricemia     right foot and left wrist    Sleep apnea     mild ---does not need a C-PAP    Stroke 1/2012     Current Outpatient Medications on File Prior to Visit   Medication Sig Dispense Refill    allopurinoL (ZYLOPRIM) 300 MG tablet Take 1 tablet (300 mg total) by mouth once daily. 90 tablet 3    amLODIPine (NORVASC) 2.5 MG tablet Take 1 tablet (2.5 mg total) by mouth every evening. 90 tablet 3    aspirin 81 mg Tab Take 81 mg by mouth once daily.      clopidogreL (PLAVIX) 75 mg tablet Take 1 tablet (75 mg total) by mouth once daily. 90 tablet 3    ezetimibe (ZETIA) 10 mg tablet Take 1 tablet (10 mg total) by mouth once daily. 90 tablet 3    nitroGLYCERIN (NITROSTAT) 0.4 MG SL tablet Place 1 tablet (0.4 mg total) under the tongue every 5 (five) minutes as needed. 25 tablet 0    rosuvastatin (CRESTOR) 10 MG tablet Take 1 tablet (10 mg total) by mouth every evening. 90 tablet 3     valsartan-hydrochlorothiazide (DIOVAN-HCT) 80-12.5 mg per tablet Take 1 tablet by mouth once daily. 90 tablet 4     Current Facility-Administered Medications on File Prior to Visit   Medication Dose Route Frequency Provider Last Rate Last Admin    acetaminophen tablet 650 mg  650 mg Oral Once PRN aZc Wing, NP        albuterol inhaler 2 puff  2 puff Inhalation Q20 Min PRN Zac Wing, NP        diphenhydrAMINE injection 25 mg  25 mg Intravenous Once PRN Zac Wing, KASANDRA        EPINEPHrine injection 0.3 mg  0.3 mg Intramuscular PRN Zac Wing, KASANDRA        methylPREDNISolone sodium succinate injection 40 mg  40 mg Intravenous Once PRN Zac Wing, KASANDRA        ondansetron disintegrating tablet 4 mg  4 mg Oral Once PRN Zac Wing, NP        sodium chloride 0.9% 500 mL flush bag   Intravenous PRN Zac Wing, NP        sodium chloride 0.9% flush 10 mL  10 mL Intravenous PRN Zac Wing, KASANDRA           Review of Systems   Constitutional: Negative for fever, appetite change and unexpected weight change.   HENT: Negative for ear pain, nosebleeds, congestion, sore throat, rhinorrhea, trouble swallowing, neck pain, postnasal drip, sinus pressure and ear discharge.    Eyes: Negative for pain, discharge, redness and visual disturbance.   Respiratory: Negative for apnea, cough, chest tightness, shortness of breath and wheezing.    Cardiovascular: Negative for chest pain, palpitations and leg swelling.   Gastrointestinal: Negative for nausea, vomiting, abdominal pain, diarrhea, constipation, blood in stool and abdominal distention.   Genitourinary: Negative for dysuria, urgency, frequency, hematuria, flank pain, scrotal swelling, difficulty urinating and testicular pain.   Skin: Negative for color change, rash and wound.   Neurological: Negative for dizziness, tremors, seizures, syncope, speech difficulty, weakness, light-headedness and numbness.   Hematological: Negative for adenopathy. Does  "not bruise/bleed easily.   Psychiatric/Behavioral: Negative for suicidal ideas, hallucinations, behavioral problems and confusion.   Intermittent right knee pains      Objective:      /78 (BP Location: Right arm, Patient Position: Sitting)   Pulse 61   Temp 98.1 °F (36.7 °C) (Oral)   Ht 5' 6" (1.676 m)   Wt 95 kg (209 lb 7 oz)   SpO2 98%   BMI 33.80 kg/m²   Physical Exam   Constitutional: He is oriented to person, place, and time. He appears well-developed and well-nourished. He is active and cooperative.   Neck: Trachea normal, normal range of motion and full passive range of motion without pain. Neck supple. Normal carotid pulses and no JVD present. Carotid bruit is not present. No tracheal deviation present. No mass and no thyromegaly present.   Cardiovascular: Normal rate, regular rhythm, S1 normal, S2 normal, normal heart sounds and intact distal pulses.  Exam reveals no gallop and no friction rub.    No murmur heard.  Pulmonary/Chest: Effort normal and breath sounds normal. No respiratory distress. He has no wheezes. He has no rales.   Abdominal: Soft. Normal appearance and bowel sounds are normal. He exhibits no distension and no mass. There is no hepatosplenomegaly. There is no tenderness. There is no rebound and no guarding.   Musculoskeletal: Normal range of motion. He exhibits no edema and no tenderness.   Neurological: He is alert and oriented to person, place, and time. He has normal strength. No cranial nerve deficit. Coordination normal.   Skin: Skin is warm, dry and intact. No rash noted. No cyanosis or erythema. No pallor. Nails show no clubbing.   Psychiatric: He has a normal mood and affect. His speech is normal and behavior is normal. Judgment and thought content normal.   Right lower leg varicosities and 1+ edema.        Results for orders placed or performed in visit on 11/06/21   Lipid Panel   Result Value Ref Range    Cholesterol 164 120 - 199 mg/dL    Triglycerides 261 (H) 30 - " 150 mg/dL    HDL 33 (L) 40 - 75 mg/dL    LDL Cholesterol 78.8 63.0 - 159.0 mg/dL    HDL/Cholesterol Ratio 20.1 20.0 - 50.0 %    Total Cholesterol/HDL Ratio 5.0 2.0 - 5.0    Non-HDL Cholesterol 131 mg/dL   Comprehensive Metabolic Panel   Result Value Ref Range    Sodium 139 136 - 145 mmol/L    Potassium 4.3 3.5 - 5.1 mmol/L    Chloride 104 95 - 110 mmol/L    CO2 28 23 - 29 mmol/L    Glucose 104 70 - 110 mg/dL    BUN 15 6 - 20 mg/dL    Creatinine 0.9 0.5 - 1.4 mg/dL    Calcium 9.8 8.7 - 10.5 mg/dL    Total Protein 7.2 6.0 - 8.4 g/dL    Albumin 4.1 3.5 - 5.2 g/dL    Total Bilirubin 0.4 0.1 - 1.0 mg/dL    Alkaline Phosphatase 57 55 - 135 U/L    AST 24 10 - 40 U/L    ALT 30 10 - 44 U/L    Anion Gap 7 (L) 8 - 16 mmol/L    eGFR if African American >60.0 >60 mL/min/1.73 m^2    eGFR if non African American >60.0 >60 mL/min/1.73 m^2   CBC auto differential   Result Value Ref Range    WBC 8.39 3.90 - 12.70 K/uL    RBC 4.72 4.60 - 6.20 M/uL    Hemoglobin 13.9 (L) 14.0 - 18.0 g/dL    Hematocrit 44.3 40.0 - 54.0 %    MCV 94 82 - 98 fL    MCH 29.4 27.0 - 31.0 pg    MCHC 31.4 (L) 32.0 - 36.0 g/dL    RDW 13.2 11.5 - 14.5 %    Platelets 290 150 - 450 K/uL    MPV 9.0 (L) 9.2 - 12.9 fL    Immature Granulocytes 0.2 0.0 - 0.5 %    Gran # (ANC) 3.4 1.8 - 7.7 K/uL    Immature Grans (Abs) 0.02 0.00 - 0.04 K/uL    Lymph # 3.8 1.0 - 4.8 K/uL    Mono # 0.8 0.3 - 1.0 K/uL    Eos # 0.3 0.0 - 0.5 K/uL    Baso # 0.04 0.00 - 0.20 K/uL    nRBC 0 0 /100 WBC    Gran % 40.5 38.0 - 73.0 %    Lymph % 45.6 18.0 - 48.0 %    Mono % 9.5 4.0 - 15.0 %    Eosinophil % 3.7 0.0 - 8.0 %    Basophil % 0.5 0.0 - 1.9 %    Differential Method Automated        Assessment:       1. Preventative health care        Plan:       Preventative health care  -     Comprehensive Metabolic Panel; Future; Expected date: 08/02/2022  -     Lipid Panel; Future; Expected date: 08/02/2022  -     CBC Auto Differential; Future; Expected date: 08/02/2022  -     PSA, Screening; Future;  Expected date: 08/02/2022  -     CK; Future; Expected date: 08/02/2022  -     Uric Acid; Future; Expected date: 08/02/2022  -     Hepatitis C Antibody; Future; Expected date: 08/02/2022      Labs today  Continue present meds  Continue indomethacin PRN  Continue allopurinol daily  Continue oral B12 1,000mcg daily  Counseled on regular exercise, maintenance of a healthy weight, balanced diet rich in fruits/vegetables and lean protein, and avoidance of unhealthy habits like smoking and excessive alcohol intake.  Goal weight of 190  meds refilled  F/u 12 months with labs

## 2022-08-03 LAB — HCV AB SERPL QL IA: NEGATIVE

## 2022-11-01 ENCOUNTER — OFFICE VISIT (OUTPATIENT)
Dept: CARDIOLOGY | Facility: CLINIC | Age: 57
End: 2022-11-01
Payer: COMMERCIAL

## 2022-11-01 VITALS
HEIGHT: 66 IN | SYSTOLIC BLOOD PRESSURE: 146 MMHG | WEIGHT: 210.13 LBS | DIASTOLIC BLOOD PRESSURE: 81 MMHG | HEART RATE: 64 BPM | BODY MASS INDEX: 33.77 KG/M2

## 2022-11-01 DIAGNOSIS — Z98.61 HISTORY OF PTCA: Chronic | ICD-10-CM

## 2022-11-01 DIAGNOSIS — Z82.49 FAMILY HISTORY OF ISCHEMIC HEART DISEASE (IHD): ICD-10-CM

## 2022-11-01 DIAGNOSIS — E78.2 MIXED HYPERLIPIDEMIA: ICD-10-CM

## 2022-11-01 DIAGNOSIS — I25.10 CORONARY ARTERY DISEASE INVOLVING NATIVE CORONARY ARTERY OF NATIVE HEART WITHOUT ANGINA PECTORIS: Primary | ICD-10-CM

## 2022-11-01 PROCEDURE — 1159F PR MEDICATION LIST DOCUMENTED IN MEDICAL RECORD: ICD-10-PCS | Mod: CPTII,S$GLB,, | Performed by: INTERNAL MEDICINE

## 2022-11-01 PROCEDURE — 1160F RVW MEDS BY RX/DR IN RCRD: CPT | Mod: CPTII,S$GLB,, | Performed by: INTERNAL MEDICINE

## 2022-11-01 PROCEDURE — 3079F DIAST BP 80-89 MM HG: CPT | Mod: CPTII,S$GLB,, | Performed by: INTERNAL MEDICINE

## 2022-11-01 PROCEDURE — 99214 PR OFFICE/OUTPT VISIT, EST, LEVL IV, 30-39 MIN: ICD-10-PCS | Mod: S$GLB,,, | Performed by: INTERNAL MEDICINE

## 2022-11-01 PROCEDURE — 99999 PR PBB SHADOW E&M-EST. PATIENT-LVL III: CPT | Mod: PBBFAC,,, | Performed by: INTERNAL MEDICINE

## 2022-11-01 PROCEDURE — 99999 PR PBB SHADOW E&M-EST. PATIENT-LVL III: ICD-10-PCS | Mod: PBBFAC,,, | Performed by: INTERNAL MEDICINE

## 2022-11-01 PROCEDURE — 3079F PR MOST RECENT DIASTOLIC BLOOD PRESSURE 80-89 MM HG: ICD-10-PCS | Mod: CPTII,S$GLB,, | Performed by: INTERNAL MEDICINE

## 2022-11-01 PROCEDURE — 1159F MED LIST DOCD IN RCRD: CPT | Mod: CPTII,S$GLB,, | Performed by: INTERNAL MEDICINE

## 2022-11-01 PROCEDURE — 99214 OFFICE O/P EST MOD 30 MIN: CPT | Mod: S$GLB,,, | Performed by: INTERNAL MEDICINE

## 2022-11-01 PROCEDURE — 3077F PR MOST RECENT SYSTOLIC BLOOD PRESSURE >= 140 MM HG: ICD-10-PCS | Mod: CPTII,S$GLB,, | Performed by: INTERNAL MEDICINE

## 2022-11-01 PROCEDURE — 3077F SYST BP >= 140 MM HG: CPT | Mod: CPTII,S$GLB,, | Performed by: INTERNAL MEDICINE

## 2022-11-01 PROCEDURE — 1160F PR REVIEW ALL MEDS BY PRESCRIBER/CLIN PHARMACIST DOCUMENTED: ICD-10-PCS | Mod: CPTII,S$GLB,, | Performed by: INTERNAL MEDICINE

## 2022-11-01 NOTE — PROGRESS NOTES
"Subjective:    Patient ID:  Rafael Gautam Jr. is a 57 y.o. male who presents for follow-up of Hypertension      Problem List Items Addressed This Visit          Cardiac/Vascular    History of PTCA     (7/12 RCA BMS 2.75x12) (Chronic)    Overview     LAD/CX- LI. RCA- occluded         Coronary artery disease involving native coronary artery of native heart without angina pectoris - Primary    Overview     8/19  Mild disease of the LAD and circumflex verified FFR as above.  Diastolic dysfunction.         Family history of ischemic heart disease (IHD)    Mixed hyperlipidemia       HPI    Patient was previously seen by Dr. Renteria    On assessment today, the patient states that he feels OK today.    No chest pain.  No shortness of breath.  Staying very active    Stent history - 6 months prior to stent, had a stroke, had weakness, went to hospital for blood in stool. Ultimately had angiogram and had stent.        Objective:     Vitals:    11/01/22 1003   BP: (!) 146/81   BP Location: Left arm   Patient Position: Sitting   BP Method: Large (Automatic)   Pulse: 64   Weight: 95.3 kg (210 lb 1.6 oz)   Height: 5' 6" (1.676 m)        Physical Exam  Constitutional:       Appearance: He is well-developed.   HENT:      Head: Normocephalic and atraumatic.   Neck:      Vascular: No JVD.   Cardiovascular:      Rate and Rhythm: Normal rate and regular rhythm.      Heart sounds: Normal heart sounds. No murmur heard.    No friction rub. No gallop.   Pulmonary:      Effort: Pulmonary effort is normal. No respiratory distress.      Breath sounds: Normal breath sounds. No wheezing or rales.   Abdominal:      General: Bowel sounds are normal.      Palpations: Abdomen is soft.      Tenderness: There is no abdominal tenderness. There is no guarding or rebound.   Musculoskeletal:      Cervical back: Normal range of motion and neck supple.   Skin:     General: Skin is warm and dry.   Neurological:      Mental Status: He is alert and oriented to " person, place, and time.   Psychiatric:         Behavior: Behavior normal.           Current Outpatient Medications on File Prior to Visit   Medication Sig    allopurinoL (ZYLOPRIM) 300 MG tablet Take 1 tablet (300 mg total) by mouth once daily.    amLODIPine (NORVASC) 2.5 MG tablet Take 1 tablet (2.5 mg total) by mouth every evening.    aspirin 81 mg Tab Take 81 mg by mouth once daily.    clopidogreL (PLAVIX) 75 mg tablet Take 1 tablet (75 mg total) by mouth once daily.    ezetimibe (ZETIA) 10 mg tablet Take 1 tablet (10 mg total) by mouth once daily.    nitroGLYCERIN (NITROSTAT) 0.4 MG SL tablet Place 1 tablet (0.4 mg total) under the tongue every 5 (five) minutes as needed.    rosuvastatin (CRESTOR) 10 MG tablet Take 1 tablet (10 mg total) by mouth every evening.    valsartan-hydrochlorothiazide (DIOVAN-HCT) 80-12.5 mg per tablet Take 1 tablet by mouth once daily.     Current Facility-Administered Medications on File Prior to Visit   Medication    acetaminophen tablet 650 mg    albuterol inhaler 2 puff    diphenhydrAMINE injection 25 mg    EPINEPHrine injection 0.3 mg    ondansetron disintegrating tablet 4 mg    sodium chloride 0.9% 500 mL flush bag    sodium chloride 0.9% flush 10 mL       Lipid Panel:   Lab Results   Component Value Date    CHOL 126 08/02/2022    HDL 34 (L) 08/02/2022    LDLCALC 66.4 08/02/2022    TRIG 128 08/02/2022    CHOLHDL 27.0 08/02/2022       The ASCVD Risk score (Joe SOTO, et al., 2019) failed to calculate for the following reasons:    The valid total cholesterol range is 130 to 320 mg/dL    All pertinent labs, imaging, and EKGs reviewed.  Patient's most recent EKG tracing was personally interpreted by this provider.    Most Recent Echocardiogram Results  Results for orders placed in visit on 09/28/20    Echo Color Flow Doppler? Yes    Interpretation Summary  · There is left ventricular eccentric hypertrophy.  · The left ventricle is moderately enlarged with normal systolic function.  The estimated ejection fraction is 55%.  · Normal left ventricular diastolic function.  · Normal right ventricular systolic function.  · Normal central venous pressure (3 mmHg).  · Mild right ventricular enlargement.  · Mild left atrial enlargement.  · Mild pulmonic regurgitation.        Most Recent EKG  Results for orders placed or performed during the hospital encounter of 07/27/21   EKG 12-lead    Collection Time: 07/27/21  2:44 PM    Narrative    Test Reason : R06.02,    Vent. Rate : 086 BPM     Atrial Rate : 086 BPM     P-R Int : 134 ms          QRS Dur : 098 ms      QT Int : 358 ms       P-R-T Axes : 073 009 000 degrees     QTc Int : 428 ms    Normal sinus rhythm  Inferior infarct ,age undetermined  Abnormal ECG  When compared with ECG of 07-MAR-2014 10:04,  Previous ECG has undetermined rhythm, needs review  Inferior infarct is now Present  Confirmed by Izabel Mitchell MD ([id number]) on 7/27/2021 7:29:19 PM    Referred By:             Confirmed By:Izabel Mitchell MD       No valid procedures specified.   No results found for this or any previous visit.    No valid procedures specified.      Assessment:       1. Coronary artery disease involving native coronary artery of native heart without angina pectoris    2. Family history of ischemic heart disease (IHD)    3. History of PTCA     (7/12 RCA BMS 2.75x12)    4. Mixed hyperlipidemia         Plan:     Symptoms good today  BP borderline today  Most recent echocardiogram reviewed personally     Echocardiogram    Continue amlodipine 2.5 mg PO Daily   Continue aspirin 81 mg PO Daily   Continue Plavix 75 mg PO Daily    Continue Zetia 10 mg PO Daily   Continue Crestor 10 mg PO Daily   Continue valsartan-hydrochlorothiazide 80-12.5 mg PO Daily    Continue other cardiac medications  Mediterranean Diet/Cardiovascular Exercise Program    Patient queried and all questions were answered.    F/u in 1 year to reassess      Signed:    Sumit Mooney MD  11/1/2022 8:33 AM

## 2022-11-22 DIAGNOSIS — E79.0 HYPERURICEMIA: ICD-10-CM

## 2022-11-22 DIAGNOSIS — E78.2 MIXED HYPERLIPIDEMIA: ICD-10-CM

## 2022-11-22 DIAGNOSIS — I25.10 CORONARY ARTERY DISEASE INVOLVING NATIVE CORONARY ARTERY OF NATIVE HEART WITHOUT ANGINA PECTORIS: ICD-10-CM

## 2022-11-22 DIAGNOSIS — I10 ESSENTIAL HYPERTENSION: Chronic | ICD-10-CM

## 2022-11-22 RX ORDER — EZETIMIBE 10 MG/1
10 TABLET ORAL DAILY
Qty: 90 TABLET | Refills: 3 | Status: SHIPPED | OUTPATIENT
Start: 2022-11-22 | End: 2023-11-27

## 2022-11-22 RX ORDER — ALLOPURINOL 300 MG/1
300 TABLET ORAL DAILY
Qty: 90 TABLET | Refills: 3 | Status: SHIPPED | OUTPATIENT
Start: 2022-11-22 | End: 2023-11-28 | Stop reason: SDUPTHER

## 2022-11-22 RX ORDER — ROSUVASTATIN CALCIUM 10 MG/1
10 TABLET, COATED ORAL NIGHTLY
Qty: 90 TABLET | Refills: 3 | Status: SHIPPED | OUTPATIENT
Start: 2022-11-22 | End: 2023-11-28 | Stop reason: SDUPTHER

## 2022-11-22 RX ORDER — AMLODIPINE BESYLATE 2.5 MG/1
2.5 TABLET ORAL NIGHTLY
Qty: 90 TABLET | Refills: 3 | Status: SHIPPED | OUTPATIENT
Start: 2022-11-22 | End: 2023-11-28 | Stop reason: SDUPTHER

## 2022-11-22 RX ORDER — CLOPIDOGREL BISULFATE 75 MG/1
75 TABLET ORAL DAILY
Qty: 90 TABLET | Refills: 3 | Status: SHIPPED | OUTPATIENT
Start: 2022-11-22 | End: 2023-11-28 | Stop reason: SDUPTHER

## 2022-11-22 NOTE — TELEPHONE ENCOUNTER
----- Message from Audrey Arellano sent at 11/22/2022  3:28 PM CST -----  Contact: Wife  Type:  RX Refill Request    Who Called: Wife     Refill or New Rx:refill    RX Name and Strength:  ezetimibe (ZETIA) 10 mg tablet    rosuvastatin (CRESTOR) 10 MG tablet    amLODIPine (NORVASC) 2.5 MG tablet    clopidogreL (PLAVIX) 75 mg tablet    allopurinoL (ZYLOPRIM) 300 MG tablet      How is the patient currently taking it? (ex. 1XDay): multiple     Is this a 30 day or 90 day RX:90    Preferred Pharmacy with phone number[facility]  [facility] [address]  [address]  [place] [address]  Phone: [phone] Fax: [phone]      Local or Mail Order:Local    Ordering Provider: Ranjit     Would the patient rather a call back or a response via [facility]? Call    Best Call Back Number:[phone] (home) [phone] (work)    Additional Information: Patient requesting refills on the following meds. Please call to advise when sent to the pharmacy                 ezetimibe (ZETIA) 10 mg tablet    rosuvastatin (CRESTOR) 10 MG tablet    amLODIPine (NORVASC) 2.5 MG tablet    clopidogreL (PLAVIX) 75 mg tablet    allopurinoL (ZYLOPRIM) 300 MG tablet

## 2022-11-22 NOTE — TELEPHONE ENCOUNTER
No new care gaps identified.  [facility] Embedded Care Gaps. Reference number: [id number]. 11/22/2022   3:37:58 PM CST

## 2022-12-05 NOTE — TELEPHONE ENCOUNTER
Spoke to Evonne. She states patient usually has labs for Dr. Church every 6 months. No labs ordered except labs for Dr. Renteria. Please advise.    117

## 2023-01-06 ENCOUNTER — PATIENT MESSAGE (OUTPATIENT)
Dept: ADMINISTRATIVE | Facility: HOSPITAL | Age: 58
End: 2023-01-06
Payer: COMMERCIAL

## 2023-01-06 ENCOUNTER — PATIENT OUTREACH (OUTPATIENT)
Dept: ADMINISTRATIVE | Facility: HOSPITAL | Age: 58
End: 2023-01-06
Payer: COMMERCIAL

## 2023-01-06 NOTE — PROGRESS NOTES
Uncontrolled BP REPORT  BP Readings from Last 3 Encounters:   11/01/22 (!) 146/81   08/02/22 120/78   11/11/21 123/72       Non-compliant report chart audits for HYPERTENSION MANAGEMENT     Outreach to patient in reference to hypertension management       BLOOD PRESSURE FOLLOW UP NEEDED WITH A CARE TEAM MEMBER    ACTIVE PORTAL MESSAGE OR LETTER SENT

## 2023-01-09 ENCOUNTER — CLINICAL SUPPORT (OUTPATIENT)
Dept: CARDIOLOGY | Facility: HOSPITAL | Age: 58
End: 2023-01-09
Attending: INTERNAL MEDICINE
Payer: COMMERCIAL

## 2023-01-09 VITALS — BODY MASS INDEX: 33.75 KG/M2 | WEIGHT: 210 LBS | HEIGHT: 66 IN

## 2023-01-09 DIAGNOSIS — Z98.61 HISTORY OF PTCA: ICD-10-CM

## 2023-01-09 DIAGNOSIS — Z98.61 HISTORY OF PTCA: Chronic | ICD-10-CM

## 2023-01-09 LAB
ASCENDING AORTA: 3.01 CM
AV INDEX (PROSTH): 0.89
AV MEAN GRADIENT: 2 MMHG
AV PEAK GRADIENT: 4 MMHG
AV VALVE AREA: 3.78 CM2
AV VELOCITY RATIO: 0.93
BSA FOR ECHO PROCEDURE: 2.11 M2
CV ECHO LV RWT: 0.46 CM
DOP CALC AO PEAK VEL: 0.95 M/S
DOP CALC AO VTI: 18.8 CM
DOP CALC LVOT AREA: 4.2 CM2
DOP CALC LVOT DIAMETER: 2.32 CM
DOP CALC LVOT PEAK VEL: 0.88 M/S
DOP CALC LVOT STROKE VOLUME: 70.98 CM3
DOP CALCLVOT PEAK VEL VTI: 16.8 CM
E WAVE DECELERATION TIME: 142.48 MSEC
E/A RATIO: 1.82
E/E' RATIO: 8.35 M/S
ECHO LV POSTERIOR WALL: 1.19 CM (ref 0.6–1.1)
EJECTION FRACTION: 60 %
FRACTIONAL SHORTENING: 36 % (ref 28–44)
INTERVENTRICULAR SEPTUM: 1.22 CM (ref 0.6–1.1)
IVRT: 110.37 MSEC
LA MAJOR: 6 CM
LA MINOR: 5.46 CM
LA WIDTH: 3.5 CM
LEFT ATRIUM SIZE: 3.8 CM
LEFT ATRIUM VOLUME INDEX: 31.7 ML/M2
LEFT ATRIUM VOLUME: 64.63 CM3
LEFT INTERNAL DIMENSION IN SYSTOLE: 3.3 CM (ref 2.1–4)
LEFT VENTRICLE DIASTOLIC VOLUME INDEX: 61.82 ML/M2
LEFT VENTRICLE DIASTOLIC VOLUME: 126.12 ML
LEFT VENTRICLE MASS INDEX: 120 G/M2
LEFT VENTRICLE SYSTOLIC VOLUME INDEX: 21.7 ML/M2
LEFT VENTRICLE SYSTOLIC VOLUME: 44.18 ML
LEFT VENTRICULAR INTERNAL DIMENSION IN DIASTOLE: 5.14 CM (ref 3.5–6)
LEFT VENTRICULAR MASS: 245.68 G
LV LATERAL E/E' RATIO: 7.1 M/S
LV SEPTAL E/E' RATIO: 10.14 M/S
LVOT MG: 1.6 MMHG
LVOT MV: 0.59 CM/S
MV PEAK A VEL: 0.39 M/S
MV PEAK E VEL: 0.71 M/S
MV STENOSIS PRESSURE HALF TIME: 41.32 MS
MV VALVE AREA P 1/2 METHOD: 5.32 CM2
PISA TR MAX VEL: 1.83 M/S
PULM VEIN S/D RATIO: 1.04
PV PEAK D VEL: 0.5 M/S
PV PEAK S VEL: 0.52 M/S
RA MAJOR: 5.3 CM
RA PRESSURE: 3 MMHG
RA WIDTH: 3.62 CM
RIGHT VENTRICULAR END-DIASTOLIC DIMENSION: 3.89 CM
RIGHT VENTRICULAR LENGTH IN DIASTOLE (APICAL 4-CHAMBER VIEW): 7.17 CM
RV MID DIAMA: 2.41 CM
RV TISSUE DOPPLER FREE WALL SYSTOLIC VELOCITY 1 (APICAL 4 CHAMBER VIEW): 0.02 CM/S
SINUS: 3.19 CM
STJ: 2.93 CM
TDI LATERAL: 0.1 M/S
TDI SEPTAL: 0.07 M/S
TDI: 0.09 M/S
TR MAX PG: 13 MMHG
TRICUSPID ANNULAR PLANE SYSTOLIC EXCURSION: 2.12 CM
TV REST PULMONARY ARTERY PRESSURE: 16 MMHG

## 2023-01-09 PROCEDURE — 93306 ECHO (CUPID ONLY): ICD-10-PCS | Mod: 26,,, | Performed by: INTERNAL MEDICINE

## 2023-01-09 PROCEDURE — 93306 TTE W/DOPPLER COMPLETE: CPT | Mod: PO

## 2023-01-09 PROCEDURE — 93306 TTE W/DOPPLER COMPLETE: CPT | Mod: 26,,, | Performed by: INTERNAL MEDICINE

## 2023-08-18 ENCOUNTER — OFFICE VISIT (OUTPATIENT)
Dept: FAMILY MEDICINE | Facility: CLINIC | Age: 58
End: 2023-08-18
Payer: COMMERCIAL

## 2023-08-18 ENCOUNTER — LAB VISIT (OUTPATIENT)
Dept: LAB | Facility: HOSPITAL | Age: 58
End: 2023-08-18
Attending: FAMILY MEDICINE
Payer: COMMERCIAL

## 2023-08-18 VITALS
DIASTOLIC BLOOD PRESSURE: 66 MMHG | HEART RATE: 73 BPM | SYSTOLIC BLOOD PRESSURE: 134 MMHG | WEIGHT: 211.19 LBS | RESPIRATION RATE: 18 BRPM | HEIGHT: 66 IN | BODY MASS INDEX: 33.94 KG/M2 | TEMPERATURE: 99 F | OXYGEN SATURATION: 96 %

## 2023-08-18 DIAGNOSIS — Z98.61 HISTORY OF PTCA: Chronic | ICD-10-CM

## 2023-08-18 DIAGNOSIS — Z12.11 COLON CANCER SCREENING: ICD-10-CM

## 2023-08-18 DIAGNOSIS — Z86.73 HISTORY OF CVA (CEREBROVASCULAR ACCIDENT): ICD-10-CM

## 2023-08-18 DIAGNOSIS — Z00.00 PREVENTATIVE HEALTH CARE: Primary | ICD-10-CM

## 2023-08-18 DIAGNOSIS — Z00.00 PREVENTATIVE HEALTH CARE: ICD-10-CM

## 2023-08-18 DIAGNOSIS — D49.2 NEOPLASM OF SKIN OF BACK: ICD-10-CM

## 2023-08-18 PROCEDURE — 85025 COMPLETE CBC W/AUTO DIFF WBC: CPT | Performed by: FAMILY MEDICINE

## 2023-08-18 PROCEDURE — 3078F PR MOST RECENT DIASTOLIC BLOOD PRESSURE < 80 MM HG: ICD-10-PCS | Mod: CPTII,S$GLB,, | Performed by: FAMILY MEDICINE

## 2023-08-18 PROCEDURE — 84550 ASSAY OF BLOOD/URIC ACID: CPT | Performed by: FAMILY MEDICINE

## 2023-08-18 PROCEDURE — 80061 LIPID PANEL: CPT | Performed by: FAMILY MEDICINE

## 2023-08-18 PROCEDURE — 99999 PR PBB SHADOW E&M-EST. PATIENT-LVL IV: ICD-10-PCS | Mod: PBBFAC,,, | Performed by: FAMILY MEDICINE

## 2023-08-18 PROCEDURE — 1159F PR MEDICATION LIST DOCUMENTED IN MEDICAL RECORD: ICD-10-PCS | Mod: CPTII,S$GLB,, | Performed by: FAMILY MEDICINE

## 2023-08-18 PROCEDURE — 83036 HEMOGLOBIN GLYCOSYLATED A1C: CPT | Performed by: FAMILY MEDICINE

## 2023-08-18 PROCEDURE — 3008F PR BODY MASS INDEX (BMI) DOCUMENTED: ICD-10-PCS | Mod: CPTII,S$GLB,, | Performed by: FAMILY MEDICINE

## 2023-08-18 PROCEDURE — 1159F MED LIST DOCD IN RCRD: CPT | Mod: CPTII,S$GLB,, | Performed by: FAMILY MEDICINE

## 2023-08-18 PROCEDURE — 80053 COMPREHEN METABOLIC PANEL: CPT | Performed by: FAMILY MEDICINE

## 2023-08-18 PROCEDURE — 3044F PR MOST RECENT HEMOGLOBIN A1C LEVEL <7.0%: ICD-10-PCS | Mod: CPTII,S$GLB,, | Performed by: FAMILY MEDICINE

## 2023-08-18 PROCEDURE — 3044F HG A1C LEVEL LT 7.0%: CPT | Mod: CPTII,S$GLB,, | Performed by: FAMILY MEDICINE

## 2023-08-18 PROCEDURE — 99999 PR PBB SHADOW E&M-EST. PATIENT-LVL IV: CPT | Mod: PBBFAC,,, | Performed by: FAMILY MEDICINE

## 2023-08-18 PROCEDURE — 3075F SYST BP GE 130 - 139MM HG: CPT | Mod: CPTII,S$GLB,, | Performed by: FAMILY MEDICINE

## 2023-08-18 PROCEDURE — 99396 PREV VISIT EST AGE 40-64: CPT | Mod: S$GLB,,, | Performed by: FAMILY MEDICINE

## 2023-08-18 PROCEDURE — 3075F PR MOST RECENT SYSTOLIC BLOOD PRESS GE 130-139MM HG: ICD-10-PCS | Mod: CPTII,S$GLB,, | Performed by: FAMILY MEDICINE

## 2023-08-18 PROCEDURE — 36415 COLL VENOUS BLD VENIPUNCTURE: CPT | Mod: PO | Performed by: FAMILY MEDICINE

## 2023-08-18 PROCEDURE — 99396 PR PREVENTIVE VISIT,EST,40-64: ICD-10-PCS | Mod: S$GLB,,, | Performed by: FAMILY MEDICINE

## 2023-08-18 PROCEDURE — 3078F DIAST BP <80 MM HG: CPT | Mod: CPTII,S$GLB,, | Performed by: FAMILY MEDICINE

## 2023-08-18 PROCEDURE — 3008F BODY MASS INDEX DOCD: CPT | Mod: CPTII,S$GLB,, | Performed by: FAMILY MEDICINE

## 2023-08-18 PROCEDURE — 84153 ASSAY OF PSA TOTAL: CPT | Performed by: FAMILY MEDICINE

## 2023-08-18 RX ORDER — VALSARTAN AND HYDROCHLOROTHIAZIDE 80; 12.5 MG/1; MG/1
1 TABLET, FILM COATED ORAL DAILY
Qty: 90 TABLET | Refills: 3 | Status: SHIPPED | OUTPATIENT
Start: 2023-08-18 | End: 2023-11-28 | Stop reason: SDUPTHER

## 2023-08-18 NOTE — Clinical Note
This patient has a very suspicious black irregular shaped neoplasm on his left upper back. Please see his note today for the picture. Please contact him to arrange a visit for likely need for excision.

## 2023-08-18 NOTE — PROGRESS NOTES
Subjective:       Patient ID: Rafael Gautam Jr. is a 58 y.o. male.    Chief Complaint: Preventative Health Care (Physical)    HPI Comments: Here today for annual exam and f/u on chronic health issues    He reports overall feeling well.  HTN - tolerating Diovan HCT  and amlodipine 2.5mg daily; home /70  HLD - tolerating Crestor 10mg and Zetia 10mg daily  CAD s/p cardiac stents - tolerating Aspirin 81mg, Plavix 75mg daily  Gout - uses indomethacin PRN flares; no flares in the last 6 months; He is taking allopurinol 300mg daily  b12 deficiency - stopped oral B12     C/o itchy black mole on left upper back      Past Medical History:   Diagnosis Date    Anemia     Anticoagulant long-term use     B12 deficiency anemia     CAD (coronary artery disease)     Carotid atherosclerosis     Essential hypertension 7/8/2015    Family history of ischemic heart disease (IHD) 1/31/2012    Hemorrhoid     History of CVA (cerebrovascular accident) 5/9/2019    History of PTCA     (7/12 RCA BMS 2.75x12) 8/3/2012    LAD/CX- LI. RCA- occluded     HLD (hyperlipidemia)     Hypertension     Hyperuricemia     right foot and left wrist    Sleep apnea     mild ---does not need a C-PAP    Stroke 1/2012     Current Outpatient Medications on File Prior to Visit   Medication Sig Dispense Refill    allopurinoL (ZYLOPRIM) 300 MG tablet Take 1 tablet (300 mg total) by mouth once daily. 90 tablet 3    amLODIPine (NORVASC) 2.5 MG tablet Take 1 tablet (2.5 mg total) by mouth every evening. 90 tablet 3    aspirin 81 mg Tab Take 81 mg by mouth once daily.      clopidogreL (PLAVIX) 75 mg tablet Take 1 tablet (75 mg total) by mouth once daily. 90 tablet 3    ezetimibe (ZETIA) 10 mg tablet Take 1 tablet (10 mg total) by mouth once daily. 90 tablet 3    rosuvastatin (CRESTOR) 10 MG tablet Take 1 tablet (10 mg total) by mouth every evening. 90 tablet 3    [DISCONTINUED] valsartan-hydrochlorothiazide (DIOVAN-HCT) 80-12.5 mg per tablet Take 1 tablet by  mouth once daily. 90 tablet 0    nitroGLYCERIN (NITROSTAT) 0.4 MG SL tablet Place 1 tablet (0.4 mg total) under the tongue every 5 (five) minutes as needed. (Patient not taking: Reported on 8/18/2023) 25 tablet 0     Current Facility-Administered Medications on File Prior to Visit   Medication Dose Route Frequency Provider Last Rate Last Admin    acetaminophen tablet 650 mg  650 mg Oral Once PRN Zac Wing, NP        albuterol inhaler 2 puff  2 puff Inhalation Q20 Min PRN Zac Wing, NP        diphenhydrAMINE injection 25 mg  25 mg Intravenous Once PRN Zac Wing, NP        EPINEPHrine injection 0.3 mg  0.3 mg Intramuscular PRN Zac Wing, NP        ondansetron disintegrating tablet 4 mg  4 mg Oral Once PRN Zac Wing, NP        sodium chloride 0.9% 500 mL flush bag   Intravenous PRN Zac Wing, NP        sodium chloride 0.9% flush 10 mL  10 mL Intravenous PRN Zac Wing, NP           Review of Systems   Constitutional: Negative for fever, appetite change and unexpected weight change.   HENT: Negative for ear pain, nosebleeds, congestion, sore throat, rhinorrhea, trouble swallowing, neck pain, postnasal drip, sinus pressure and ear discharge.    Eyes: Negative for pain, discharge, redness and visual disturbance.   Respiratory: Negative for apnea, cough, chest tightness, shortness of breath and wheezing.    Cardiovascular: Negative for chest pain, palpitations and leg swelling.   Gastrointestinal: Negative for nausea, vomiting, abdominal pain, diarrhea, constipation, blood in stool and abdominal distention.   Genitourinary: Negative for dysuria, urgency, frequency, hematuria, flank pain, scrotal swelling, difficulty urinating and testicular pain.   Skin: Negative for color change, rash and wound.   Neurological: Negative for dizziness, tremors, seizures, syncope, speech difficulty, weakness, light-headedness and numbness.   Hematological: Negative for adenopathy. Does not  "bruise/bleed easily.   Psychiatric/Behavioral: Negative for suicidal ideas, hallucinations, behavioral problems and confusion.   Intermittent right knee pains      Objective:      /66   Pulse 73   Temp 98.5 °F (36.9 °C) (Oral)   Resp 18   Ht 5' 6" (1.676 m)   Wt 95.8 kg (211 lb 3.2 oz)   SpO2 96%   BMI 34.09 kg/m²   Physical Exam   Constitutional: He is oriented to person, place, and time. He appears well-developed and well-nourished. He is active and cooperative.   Neck: Trachea normal, normal range of motion and full passive range of motion without pain. Neck supple. Normal carotid pulses and no JVD present. Carotid bruit is not present. No tracheal deviation present. No mass and no thyromegaly present.   Cardiovascular: Normal rate, regular rhythm, S1 normal, S2 normal, normal heart sounds and intact distal pulses.  Exam reveals no gallop and no friction rub.    No murmur heard.  Pulmonary/Chest: Effort normal and breath sounds normal. No respiratory distress. He has no wheezes. He has no rales.   Abdominal: Soft. Normal appearance and bowel sounds are normal. He exhibits no distension and no mass. There is no hepatosplenomegaly. There is no tenderness. There is no rebound and no guarding.   Musculoskeletal: Normal range of motion. He exhibits no edema and no tenderness.   Neurological: He is alert and oriented to person, place, and time. He has normal strength. No cranial nerve deficit. Coordination normal.   Skin: Skin is warm, dry and intact. No rash noted. No cyanosis or erythema. No pallor. Nails show no clubbing.   Psychiatric: He has a normal mood and affect. His speech is normal and behavior is normal. Judgment and thought content normal.   Right lower leg varicosities and 1+ edema.    Left upper back:            Results for orders placed or performed in visit on 01/09/23   Echo   Result Value Ref Range    BSA 2.11 m2    TDI SEPTAL 0.07 m/s    LV LATERAL E/E' RATIO 7.10 m/s    LV SEPTAL E/E' " RATIO 10.14 m/s    LA WIDTH 3.50 cm    RV mid diameter 2.41 cm    Left Ventricular Outflow Tract Mean Velocity 0.59 cm/s    Left Ventricular Outflow Tract Mean Gradient 1.60 mmHg    TDI LATERAL 0.10 m/s    LVIDd 5.14 3.5 - 6.0 cm    IVS 1.22 (A) 0.6 - 1.1 cm    Posterior Wall 1.19 (A) 0.6 - 1.1 cm    LVIDs 3.30 2.1 - 4.0 cm    FS 36 28 - 44 %    LA volume 64.63 cm3    Sinus 3.19 cm    STJ 2.93 cm    Ascending aorta 3.01 cm    LV mass 245.68 g    LA size 3.80 cm    RVDD 3.89 cm    TAPSE 2.12 cm    Right ventricular length in diastole (apical 4-chamber view) 7.17 cm    RV S' 0.02 cm/s    Left Ventricle Relative Wall Thickness 0.46 cm    AV mean gradient 2 mmHg    AV valve area 3.78 cm2    AV Velocity Ratio 0.93     AV index (prosthetic) 0.89     MV valve area p 1/2 method 5.32 cm2    E/A ratio 1.82     Mean e' 0.09 m/s    E wave deceleration time 142.48 msec    IVRT 110.37 msec    Pulm vein S/D ratio 1.04     LVOT diameter 2.32 cm    LVOT area 4.2 cm2    LVOT peak donald 0.88 m/s    LVOT peak VTI 16.80 cm    Ao peak donald 0.95 m/s    Ao VTI 18.8 cm    LVOT stroke volume 70.98 cm3    AV peak gradient 4 mmHg    E/E' ratio 8.35 m/s    MV Peak E Donald 0.71 m/s    TR Max Donald 1.83 m/s    MV stenosis pressure 1/2 time 41.32 ms    MV Peak A Donald 0.39 m/s    PV Peak S Donald 0.52 m/s    PV Peak D Donald 0.50 m/s    LV Systolic Volume 44.18 mL    LV Systolic Volume Index 21.7 mL/m2    LV Diastolic Volume 126.12 mL    LV Diastolic Volume Index 61.82 mL/m2    LA Volume Index 31.7 mL/m2    LV Mass Index 120 g/m2    RA Major Axis 5.30 cm    Left Atrium Minor Axis 5.46 cm    Left Atrium Major Axis 6.00 cm    Triscuspid Valve Regurgitation Peak Gradient 13 mmHg    RA Width 3.62 cm    Right Atrial Pressure (from IVC) 3 mmHg    EF 60 %    TV resting pulmonary artery pressure 16 mmHg       Assessment:       1. Preventative health care    2. History of CVA (cerebrovascular accident)    3. Colon cancer screening    4. History of PTCA     (7/12 RCA BMS  2.75x12)          Plan:       Preventative health care  -     Comprehensive Metabolic Panel; Future; Expected date: 08/18/2023  -     CBC Auto Differential; Future; Expected date: 08/18/2023  -     Lipid Panel; Future; Expected date: 08/18/2023  -     PSA, Screening; Future; Expected date: 08/18/2023  -     Hemoglobin A1C; Future; Expected date: 08/18/2023  -     Uric Acid; Future; Expected date: 08/18/2023    History of CVA (cerebrovascular accident)  -     valsartan-hydrochlorothiazide (DIOVAN-HCT) 80-12.5 mg per tablet; Take 1 tablet by mouth once daily.  Dispense: 90 tablet; Refill: 3    Colon cancer screening  -     valsartan-hydrochlorothiazide (DIOVAN-HCT) 80-12.5 mg per tablet; Take 1 tablet by mouth once daily.  Dispense: 90 tablet; Refill: 3    History of PTCA     (7/12 RCA BMS 2.75x12)  -     valsartan-hydrochlorothiazide (DIOVAN-HCT) 80-12.5 mg per tablet; Take 1 tablet by mouth once daily.  Dispense: 90 tablet; Refill: 3      Dermatology referral for likely need for excision of suspicious lesion  Labs today  Continue present meds  Continue indomethacin PRN  Continue allopurinol daily  Continue oral B12 1,000mcg daily  Counseled on regular exercise, maintenance of a healthy weight, balanced diet rich in fruits/vegetables and lean protein, and avoidance of unhealthy habits like smoking and excessive alcohol intake.  Goal weight of 190  meds refilled  F/u 12 months

## 2023-08-19 LAB
ALBUMIN SERPL BCP-MCNC: 4.2 G/DL (ref 3.5–5.2)
ALP SERPL-CCNC: 56 U/L (ref 55–135)
ALT SERPL W/O P-5'-P-CCNC: 28 U/L (ref 10–44)
ANION GAP SERPL CALC-SCNC: 10 MMOL/L (ref 8–16)
AST SERPL-CCNC: 26 U/L (ref 10–40)
BASOPHILS # BLD AUTO: 0.03 K/UL (ref 0–0.2)
BASOPHILS NFR BLD: 0.3 % (ref 0–1.9)
BILIRUB SERPL-MCNC: 0.3 MG/DL (ref 0.1–1)
BUN SERPL-MCNC: 14 MG/DL (ref 6–20)
CALCIUM SERPL-MCNC: 9.3 MG/DL (ref 8.7–10.5)
CHLORIDE SERPL-SCNC: 106 MMOL/L (ref 95–110)
CHOLEST SERPL-MCNC: 158 MG/DL (ref 120–199)
CHOLEST/HDLC SERPL: 4.9 {RATIO} (ref 2–5)
CO2 SERPL-SCNC: 25 MMOL/L (ref 23–29)
COMPLEXED PSA SERPL-MCNC: 0.57 NG/ML (ref 0–4)
CREAT SERPL-MCNC: 1 MG/DL (ref 0.5–1.4)
DIFFERENTIAL METHOD: ABNORMAL
EOSINOPHIL # BLD AUTO: 0.3 K/UL (ref 0–0.5)
EOSINOPHIL NFR BLD: 2.9 % (ref 0–8)
ERYTHROCYTE [DISTWIDTH] IN BLOOD BY AUTOMATED COUNT: 13.1 % (ref 11.5–14.5)
EST. GFR  (NO RACE VARIABLE): >60 ML/MIN/1.73 M^2
ESTIMATED AVG GLUCOSE: 120 MG/DL (ref 68–131)
GLUCOSE SERPL-MCNC: 81 MG/DL (ref 70–110)
HBA1C MFR BLD: 5.8 % (ref 4–5.6)
HCT VFR BLD AUTO: 41.1 % (ref 40–54)
HDLC SERPL-MCNC: 32 MG/DL (ref 40–75)
HDLC SERPL: 20.3 % (ref 20–50)
HGB BLD-MCNC: 13.5 G/DL (ref 14–18)
IMM GRANULOCYTES # BLD AUTO: 0.04 K/UL (ref 0–0.04)
IMM GRANULOCYTES NFR BLD AUTO: 0.4 % (ref 0–0.5)
LDLC SERPL CALC-MCNC: 67.4 MG/DL (ref 63–159)
LYMPHOCYTES # BLD AUTO: 4.3 K/UL (ref 1–4.8)
LYMPHOCYTES NFR BLD: 41 % (ref 18–48)
MCH RBC QN AUTO: 30 PG (ref 27–31)
MCHC RBC AUTO-ENTMCNC: 32.8 G/DL (ref 32–36)
MCV RBC AUTO: 91 FL (ref 82–98)
MONOCYTES # BLD AUTO: 0.8 K/UL (ref 0.3–1)
MONOCYTES NFR BLD: 7.7 % (ref 4–15)
NEUTROPHILS # BLD AUTO: 5 K/UL (ref 1.8–7.7)
NEUTROPHILS NFR BLD: 47.7 % (ref 38–73)
NONHDLC SERPL-MCNC: 126 MG/DL
NRBC BLD-RTO: 0 /100 WBC
PLATELET # BLD AUTO: 297 K/UL (ref 150–450)
PMV BLD AUTO: 9.1 FL (ref 9.2–12.9)
POTASSIUM SERPL-SCNC: 4.1 MMOL/L (ref 3.5–5.1)
PROT SERPL-MCNC: 7.3 G/DL (ref 6–8.4)
RBC # BLD AUTO: 4.5 M/UL (ref 4.6–6.2)
SODIUM SERPL-SCNC: 141 MMOL/L (ref 136–145)
TRIGL SERPL-MCNC: 293 MG/DL (ref 30–150)
URATE SERPL-MCNC: 5.6 MG/DL (ref 3.4–7)
WBC # BLD AUTO: 10.47 K/UL (ref 3.9–12.7)

## 2023-08-22 ENCOUNTER — TELEPHONE (OUTPATIENT)
Dept: DERMATOLOGY | Facility: CLINIC | Age: 58
End: 2023-08-22
Payer: COMMERCIAL

## 2023-08-22 NOTE — TELEPHONE ENCOUNTER
----- Message from Tati Valentino MD sent at 8/22/2023 10:09 AM CDT -----  Yes. See if he can come today . thanks  ----- Message -----  From: Magy Rasheed MA  Sent: 8/22/2023   9:20 AM CDT  To: Tati Valentino MD    Did you want us to schedule this pt, he would a new pt, & If so do you have a place you would like to put him.  ----- Message -----  From: Pj Church MD  Sent: 8/18/2023   3:27 PM CDT  To: Chicho Duffy Staff    This patient has a very suspicious black irregular shaped neoplasm on his left upper back. Please see his note today for the picture. Please contact him to arrange a visit for likely need for excision.

## 2023-08-22 NOTE — TELEPHONE ENCOUNTER
----- Message from Tati Valentino MD sent at 8/22/2023 10:09 AM CDT -----  Yes. See if he can come today . thanks  ----- Message -----  From: Magy Rasheed MA  Sent: 8/22/2023   9:20 AM CDT  To: Tati Valentino MD    Did you want us to schedule this pt, he would a new pt, & If so do you have a place you would like to put him.  ----- Message -----  From: jP Church MD  Sent: 8/18/2023   3:27 PM CDT  To: Chicho Duffy Staff    This patient has a very suspicious black irregular shaped neoplasm on his left upper back. Please see his note today for the picture. Please contact him to arrange a visit for likely need for excision.

## 2023-08-22 NOTE — TELEPHONE ENCOUNTER
----- Message from Pj Church MD sent at 8/18/2023  3:27 PM CDT -----  This patient has a very suspicious black irregular shaped neoplasm on his left upper back. Please see his note today for the picture. Please contact him to arrange a visit for likely need for excision.

## 2023-09-05 ENCOUNTER — PROCEDURE VISIT (OUTPATIENT)
Dept: DERMATOLOGY | Facility: CLINIC | Age: 58
End: 2023-09-05
Payer: COMMERCIAL

## 2023-09-05 VITALS — BODY MASS INDEX: 33.91 KG/M2 | HEIGHT: 66 IN | WEIGHT: 211 LBS

## 2023-09-05 DIAGNOSIS — L82.1 SEBORRHEIC KERATOSES: ICD-10-CM

## 2023-09-05 DIAGNOSIS — D48.5 NEOPLASM OF UNCERTAIN BEHAVIOR OF SKIN: Primary | ICD-10-CM

## 2023-09-05 PROCEDURE — 99202 PR OFFICE/OUTPT VISIT, NEW, LEVL II, 15-29 MIN: ICD-10-PCS | Mod: 25,S$GLB,, | Performed by: DERMATOLOGY

## 2023-09-05 PROCEDURE — 11102 PR TANGENTIAL BIOPSY, SKIN, SINGLE LESION: ICD-10-PCS | Mod: S$GLB,,, | Performed by: DERMATOLOGY

## 2023-09-05 PROCEDURE — 99202 OFFICE O/P NEW SF 15 MIN: CPT | Mod: 25,S$GLB,, | Performed by: DERMATOLOGY

## 2023-09-05 PROCEDURE — 88305 TISSUE EXAM BY PATHOLOGIST: ICD-10-PCS | Mod: 26,,, | Performed by: PATHOLOGY

## 2023-09-05 PROCEDURE — 88305 TISSUE EXAM BY PATHOLOGIST: CPT | Mod: 26,,, | Performed by: PATHOLOGY

## 2023-09-05 PROCEDURE — 88305 TISSUE EXAM BY PATHOLOGIST: CPT | Mod: PO | Performed by: PATHOLOGY

## 2023-09-05 PROCEDURE — 88342 IMHCHEM/IMCYTCHM 1ST ANTB: CPT | Mod: PO | Performed by: PATHOLOGY

## 2023-09-05 PROCEDURE — 11102 TANGNTL BX SKIN SINGLE LES: CPT | Mod: S$GLB,,, | Performed by: DERMATOLOGY

## 2023-09-05 PROCEDURE — 88342 CHG IMMUNOCYTOCHEMISTRY: ICD-10-PCS | Mod: 26,,, | Performed by: PATHOLOGY

## 2023-09-05 PROCEDURE — 88342 IMHCHEM/IMCYTCHM 1ST ANTB: CPT | Mod: 26,,, | Performed by: PATHOLOGY

## 2023-09-05 NOTE — PROGRESS NOTES
Subjective:      Patient ID:  Rafael Gautam Jr. is a 58 y.o. male who presents for   Chief Complaint   Patient presents with    Spot     Spot on back      HPI  New patient c/o spot on back   No personal h/x of skin cancer  No family h/x of skin cancer     Review of Systems   Constitutional:  Negative for fever and chills.   Skin:  Negative for itching and rash.       Objective:   Physical Exam   Constitutional: He appears well-developed and well-nourished. No distress.   Neurological: He is alert and oriented to person, place, and time. He is not disoriented.   Psychiatric: He has a normal mood and affect.   Skin:   Areas Examined (abnormalities noted in diagram):   Head / Face Inspection Performed  Neck Inspection Performed  Chest / Axilla Inspection Performed  Back Inspection Performed  RUE Inspected  LUE Inspection Performed            Diagram Legend     Erythematous scaling macule/papule c/w actinic keratosis       Vascular papule c/w angioma      Pigmented verrucoid papule/plaque c/w seborrheic keratosis      Yellow umbilicated papule c/w sebaceous hyperplasia      Irregularly shaped tan macule c/w lentigo     1-2 mm smooth white papules consistent with Milia      Movable subcutaneous cyst with punctum c/w epidermal inclusion cyst      Subcutaneous movable cyst c/w pilar cyst      Firm pink to brown papule c/w dermatofibroma      Pedunculated fleshy papule(s) c/w skin tag(s)      Evenly pigmented macule c/w junctional nevus     Mildly variegated pigmented, slightly irregular-bordered macule c/w mildly atypical nevus      Flesh colored to evenly pigmented papule c/w intradermal nevus       Pink pearly papule/plaque c/w basal cell carcinoma      Erythematous hyperkeratotic cursted plaque c/w SCC      Surgical scar with no sign of skin cancer recurrence      Open and closed comedones      Inflammatory papules and pustules      Verrucoid papule consistent consistent with wart     Erythematous eczematous  patches and plaques     Dystrophic onycholytic nail with subungual debris c/w onychomycosis     Umbilicated papule    Erythematous-base heme-crusted tan verrucoid plaque consistent with inflamed seborrheic keratosis     Erythematous Silvery Scaling Plaque c/w Psoriasis     See annotation          Assessment / Plan:      Pathology Orders:       Normal Orders This Visit    Specimen to Pathology, Dermatology     Comments:    Number of Specimens:->1  ------------------------->-------------------------  Spec 1 Procedure:->Biopsy  Spec 1 Clinical Impression:->nevus r/o atypia  Spec 1 Source:->left back  Release to patient->Immediate    Questions:    Procedure Type: Dermatology and skin neoplasms    Number of Specimens: 1    ------------------------: -------------------------    Spec 1 Procedure: Biopsy    Spec 1 Clinical Impression: nevus r/o atypia    Spec 1 Source: left back    Clinical Information: irregular pigmented macule, benign appearing on dermoscopy    Release to patient: Immediate          Neoplasm of uncertain behavior of skin  -     Specimen to Pathology, Dermatology  Shave biopsy procedure note:    Shave biopsy performed after verbal consent including risk of infection, scar, recurrence, need for additional treatment of site. Area prepped with alcohol, anesthetized with approximately 1.0cc of 1% lidocaine with epinephrine. Lesional tissue shaved with razor blade. Hemostasis achieved with application of aluminum chloride followed by hyfrecation. No complications. Dressing applied. Wound care explained.      Seborrheic keratoses  These are benign inherited growths without a malignant potential. Reassurance given to patient. No treatment is necessary.       Back  These are benign inherited growths without a malignant potential. Reassurance given to patient. No treatment is necessary.            Follow up in about 6 months (around 3/5/2024).

## 2023-09-11 LAB
FINAL PATHOLOGIC DIAGNOSIS: NORMAL
GROSS: NORMAL
Lab: NORMAL
MICROSCOPIC EXAM: NORMAL

## 2023-11-16 ENCOUNTER — TELEPHONE (OUTPATIENT)
Dept: PHARMACY | Facility: CLINIC | Age: 58
End: 2023-11-16
Payer: COMMERCIAL

## 2023-11-16 NOTE — TELEPHONE ENCOUNTER
Assessed patient medication adherence for population health /[facility] medication adherence project

## 2023-11-27 ENCOUNTER — OFFICE VISIT (OUTPATIENT)
Dept: FAMILY MEDICINE | Facility: CLINIC | Age: 58
End: 2023-11-27
Payer: COMMERCIAL

## 2023-11-27 VITALS
BODY MASS INDEX: 33.55 KG/M2 | DIASTOLIC BLOOD PRESSURE: 80 MMHG | OXYGEN SATURATION: 95 % | HEIGHT: 66 IN | HEART RATE: 81 BPM | WEIGHT: 208.75 LBS | SYSTOLIC BLOOD PRESSURE: 120 MMHG

## 2023-11-27 DIAGNOSIS — L02.212 ABSCESS OF BACK: Primary | ICD-10-CM

## 2023-11-27 DIAGNOSIS — E78.2 MIXED HYPERLIPIDEMIA: ICD-10-CM

## 2023-11-27 PROBLEM — Z12.11 COLON CANCER SCREENING: Status: RESOLVED | Noted: 2017-12-11 | Resolved: 2023-11-27

## 2023-11-27 PROCEDURE — 1159F PR MEDICATION LIST DOCUMENTED IN MEDICAL RECORD: ICD-10-PCS | Mod: CPTII,S$GLB,, | Performed by: PHYSICIAN ASSISTANT

## 2023-11-27 PROCEDURE — 99999 PR PBB SHADOW E&M-EST. PATIENT-LVL IV: ICD-10-PCS | Mod: PBBFAC,,, | Performed by: PHYSICIAN ASSISTANT

## 2023-11-27 PROCEDURE — 3044F PR MOST RECENT HEMOGLOBIN A1C LEVEL <7.0%: ICD-10-PCS | Mod: CPTII,S$GLB,, | Performed by: PHYSICIAN ASSISTANT

## 2023-11-27 PROCEDURE — 99213 OFFICE O/P EST LOW 20 MIN: CPT | Mod: S$GLB,,, | Performed by: PHYSICIAN ASSISTANT

## 2023-11-27 PROCEDURE — 3079F DIAST BP 80-89 MM HG: CPT | Mod: CPTII,S$GLB,, | Performed by: PHYSICIAN ASSISTANT

## 2023-11-27 PROCEDURE — 3044F HG A1C LEVEL LT 7.0%: CPT | Mod: CPTII,S$GLB,, | Performed by: PHYSICIAN ASSISTANT

## 2023-11-27 PROCEDURE — 3079F PR MOST RECENT DIASTOLIC BLOOD PRESSURE 80-89 MM HG: ICD-10-PCS | Mod: CPTII,S$GLB,, | Performed by: PHYSICIAN ASSISTANT

## 2023-11-27 PROCEDURE — 99999 PR PBB SHADOW E&M-EST. PATIENT-LVL IV: CPT | Mod: PBBFAC,,, | Performed by: PHYSICIAN ASSISTANT

## 2023-11-27 PROCEDURE — 1159F MED LIST DOCD IN RCRD: CPT | Mod: CPTII,S$GLB,, | Performed by: PHYSICIAN ASSISTANT

## 2023-11-27 PROCEDURE — 99213 PR OFFICE/OUTPT VISIT, EST, LEVL III, 20-29 MIN: ICD-10-PCS | Mod: S$GLB,,, | Performed by: PHYSICIAN ASSISTANT

## 2023-11-27 PROCEDURE — 3074F SYST BP LT 130 MM HG: CPT | Mod: CPTII,S$GLB,, | Performed by: PHYSICIAN ASSISTANT

## 2023-11-27 PROCEDURE — 3074F PR MOST RECENT SYSTOLIC BLOOD PRESSURE < 130 MM HG: ICD-10-PCS | Mod: CPTII,S$GLB,, | Performed by: PHYSICIAN ASSISTANT

## 2023-11-27 PROCEDURE — 3008F PR BODY MASS INDEX (BMI) DOCUMENTED: ICD-10-PCS | Mod: CPTII,S$GLB,, | Performed by: PHYSICIAN ASSISTANT

## 2023-11-27 PROCEDURE — 3008F BODY MASS INDEX DOCD: CPT | Mod: CPTII,S$GLB,, | Performed by: PHYSICIAN ASSISTANT

## 2023-11-27 RX ORDER — DOXYCYCLINE 100 MG/1
100 CAPSULE ORAL 2 TIMES DAILY
Qty: 14 CAPSULE | Refills: 0 | Status: SHIPPED | OUTPATIENT
Start: 2023-11-27 | End: 2023-12-04

## 2023-11-27 RX ORDER — EZETIMIBE 10 MG/1
10 TABLET ORAL
Qty: 90 TABLET | Refills: 2 | Status: SHIPPED | OUTPATIENT
Start: 2023-11-27 | End: 2023-11-28 | Stop reason: SDUPTHER

## 2023-11-27 NOTE — PROGRESS NOTES
"Subjective:      Patient ID: Rafael Gautam Jr. is a 58 y.o. male.    Chief Complaint: skin lesion    Patient is new to me.    HPI  Patient has PMH of CVA, HLD, gout, and B12 deficiency, and CAD.    Patient reports the he has an area on his back that is growing with pain for the last week.  Has had this lesions for years.  Not used any medication on this.    Review of Systems   Constitutional:  Negative for chills and fever.   Respiratory:  Negative for shortness of breath.    Cardiovascular:  Negative for chest pain.   Skin:  Positive for wound.       Objective:   /80   Pulse 81   Ht 5' 6" (1.676 m)   Wt 94.7 kg (208 lb 12.4 oz)   SpO2 95%   BMI 33.70 kg/m²     Physical Exam  Vitals reviewed.   Constitutional:       Appearance: Normal appearance. He is well-developed.   HENT:      Head: Normocephalic and atraumatic.      Right Ear: External ear normal.      Left Ear: External ear normal.   Eyes:      Conjunctiva/sclera: Conjunctivae normal.   Cardiovascular:      Rate and Rhythm: Normal rate and regular rhythm.      Heart sounds: Normal heart sounds. No murmur heard.     No friction rub. No gallop.   Pulmonary:      Effort: Pulmonary effort is normal. No respiratory distress.      Breath sounds: Normal breath sounds. No wheezing, rhonchi or rales.   Musculoskeletal:         General: Normal range of motion.   Skin:     General: Skin is warm and dry.      Findings: Abscess present. No rash.          Neurological:      General: No focal deficit present.      Mental Status: He is alert and oriented to person, place, and time.   Psychiatric:         Mood and Affect: Mood normal.         Behavior: Behavior normal.         Judgment: Judgment normal.       Assessment:      1. Abscess of back       Plan:   1. Abscess of back  Use warm moist compresses on back or warm showers.  - doxycycline (MONODOX) 100 MG capsule; Take 1 capsule (100 mg total) by mouth 2 (two) times daily. for 7 days  Dispense: 14 capsule; " Refill: 0    Follow up as needed.  Patient agreed with plan and expressed understanding.    Thank you for allowing me to serve you,

## 2023-11-27 NOTE — TELEPHONE ENCOUNTER
No care due was identified.  [facility] Embedded Care Due Messages. Reference number: [id number].   11/27/2023 1:32:49 PM CST

## 2023-11-28 DIAGNOSIS — E78.2 MIXED HYPERLIPIDEMIA: ICD-10-CM

## 2023-11-28 DIAGNOSIS — Z12.11 COLON CANCER SCREENING: ICD-10-CM

## 2023-11-28 DIAGNOSIS — Z98.61 HISTORY OF PTCA: Chronic | ICD-10-CM

## 2023-11-28 DIAGNOSIS — E79.0 HYPERURICEMIA: ICD-10-CM

## 2023-11-28 DIAGNOSIS — I25.10 CORONARY ARTERY DISEASE INVOLVING NATIVE CORONARY ARTERY OF NATIVE HEART WITHOUT ANGINA PECTORIS: ICD-10-CM

## 2023-11-28 DIAGNOSIS — I10 ESSENTIAL HYPERTENSION: Chronic | ICD-10-CM

## 2023-11-28 DIAGNOSIS — L02.212 ABSCESS OF BACK: ICD-10-CM

## 2023-11-28 DIAGNOSIS — Z86.73 HISTORY OF CVA (CEREBROVASCULAR ACCIDENT): ICD-10-CM

## 2023-11-28 RX ORDER — ALLOPURINOL 300 MG/1
300 TABLET ORAL DAILY
Qty: 90 TABLET | Refills: 3 | Status: SHIPPED | OUTPATIENT
Start: 2023-11-28

## 2023-11-28 RX ORDER — AMLODIPINE BESYLATE 2.5 MG/1
2.5 TABLET ORAL NIGHTLY
Qty: 90 TABLET | Refills: 3 | Status: SHIPPED | OUTPATIENT
Start: 2023-11-28 | End: 2024-02-06 | Stop reason: SDUPTHER

## 2023-11-28 RX ORDER — ROSUVASTATIN CALCIUM 10 MG/1
10 TABLET, COATED ORAL NIGHTLY
Qty: 90 TABLET | Refills: 3 | Status: SHIPPED | OUTPATIENT
Start: 2023-11-28 | End: 2024-02-06 | Stop reason: SDUPTHER

## 2023-11-28 RX ORDER — VALSARTAN AND HYDROCHLOROTHIAZIDE 80; 12.5 MG/1; MG/1
1 TABLET, FILM COATED ORAL DAILY
Qty: 90 TABLET | Refills: 3 | Status: SHIPPED | OUTPATIENT
Start: 2023-11-28 | End: 2024-02-06 | Stop reason: SDUPTHER

## 2023-11-28 RX ORDER — EZETIMIBE 10 MG/1
10 TABLET ORAL DAILY
Qty: 90 TABLET | Refills: 3 | Status: SHIPPED | OUTPATIENT
Start: 2023-11-28 | End: 2024-02-06 | Stop reason: SDUPTHER

## 2023-11-28 RX ORDER — DOXYCYCLINE 100 MG/1
100 CAPSULE ORAL 2 TIMES DAILY
Qty: 14 CAPSULE | Refills: 0 | OUTPATIENT
Start: 2023-11-28 | End: 2023-12-05

## 2023-11-28 RX ORDER — CLOPIDOGREL BISULFATE 75 MG/1
75 TABLET ORAL DAILY
Qty: 90 TABLET | Refills: 3 | Status: SHIPPED | OUTPATIENT
Start: 2023-11-28 | End: 2024-02-06 | Stop reason: SDUPTHER

## 2023-11-28 NOTE — TELEPHONE ENCOUNTER
Refill Decision Note   Rafael Gautam  is requesting a refill authorization.  Brief Assessment and Rationale for Refill:  Approve     Medication Therapy Plan:         Comments:     Note composed:6:26 PM 11/27/2023

## 2023-11-28 NOTE — TELEPHONE ENCOUNTER
----- Message from Shari Vazquez sent at 11/27/2023  2:00 PM CST -----  Regarding: rt call  Type:  Patient Returning Call    Who Called:wife Shonda    Who Left Message for Patient:unknown    Does the patient know what this is regarding?:yes    Best Call Back Number:[phone]      Additional Information: wife wants a call back to discuss pt meds refills. Please call to discuss.

## 2023-12-02 ENCOUNTER — PATIENT MESSAGE (OUTPATIENT)
Dept: FAMILY MEDICINE | Facility: CLINIC | Age: 58
End: 2023-12-02
Payer: COMMERCIAL

## 2023-12-02 DIAGNOSIS — L02.212 ABSCESS OF BACK: Primary | ICD-10-CM

## 2023-12-07 ENCOUNTER — TELEPHONE (OUTPATIENT)
Dept: SURGERY | Facility: CLINIC | Age: 58
End: 2023-12-07
Payer: COMMERCIAL

## 2023-12-07 NOTE — TELEPHONE ENCOUNTER
----- Message from Kimi Ramires, Patient Care Assistant sent at 12/7/2023  8:08 AM CST -----  Regarding: appointment  Contact: pt's kevin Melgoza  Type:  Sooner Appointment Request    Caller is requesting a sooner appointment.  Caller declined first available appointment listed below.  Caller will not accept being placed on the waitlist and is requesting a message be sent to doctor.    Name of Caller:  pt's kevin Melgoza    When is the first available appointment?  2023    Symptoms: new pt referral     Would the patient rather a call back or a response via [facility]? Callback     Best Call Back Number:  [phone] (home) [phone] (work)    Additional Information:  please call pt's wife Rhona to advise. Thanks!

## 2023-12-13 ENCOUNTER — OFFICE VISIT (OUTPATIENT)
Dept: SURGERY | Facility: CLINIC | Age: 58
End: 2023-12-13
Payer: COMMERCIAL

## 2023-12-13 VITALS — TEMPERATURE: 97 F | SYSTOLIC BLOOD PRESSURE: 126 MMHG | DIASTOLIC BLOOD PRESSURE: 81 MMHG

## 2023-12-13 DIAGNOSIS — L72.3 INFECTED SEBACEOUS CYST: Primary | ICD-10-CM

## 2023-12-13 DIAGNOSIS — L08.9 INFECTED SEBACEOUS CYST: Primary | ICD-10-CM

## 2023-12-13 PROCEDURE — 3044F HG A1C LEVEL LT 7.0%: CPT | Mod: CPTII,S$GLB,, | Performed by: SURGERY

## 2023-12-13 PROCEDURE — 3074F PR MOST RECENT SYSTOLIC BLOOD PRESSURE < 130 MM HG: ICD-10-PCS | Mod: CPTII,S$GLB,, | Performed by: SURGERY

## 2023-12-13 PROCEDURE — 3044F PR MOST RECENT HEMOGLOBIN A1C LEVEL <7.0%: ICD-10-PCS | Mod: CPTII,S$GLB,, | Performed by: SURGERY

## 2023-12-13 PROCEDURE — 99203 PR OFFICE/OUTPT VISIT, NEW, LEVL III, 30-44 MIN: ICD-10-PCS | Mod: S$GLB,,, | Performed by: SURGERY

## 2023-12-13 PROCEDURE — 99203 OFFICE O/P NEW LOW 30 MIN: CPT | Mod: S$GLB,,, | Performed by: SURGERY

## 2023-12-13 PROCEDURE — 3079F PR MOST RECENT DIASTOLIC BLOOD PRESSURE 80-89 MM HG: ICD-10-PCS | Mod: CPTII,S$GLB,, | Performed by: SURGERY

## 2023-12-13 PROCEDURE — 3074F SYST BP LT 130 MM HG: CPT | Mod: CPTII,S$GLB,, | Performed by: SURGERY

## 2023-12-13 PROCEDURE — 3079F DIAST BP 80-89 MM HG: CPT | Mod: CPTII,S$GLB,, | Performed by: SURGERY

## 2023-12-13 NOTE — LETTER
December 13, 2023    Rafael Gautam Jr.  [address] [address] [address]         [facility]  [address] [address]  Phone: [phone]  Fax: [phone] December 13, 2023     Patient: Rafael Gautam Jr.   YOB: 1965   Date of Visit: 12/13/2023       To Whom It May Concern:    Rafael Gautam  was seen in our clinic today, 12/13/23. Please excuse him form work .    If you have any questions or concerns, please don't hesitate to call.    Sincerely,        Mina Lassiter Jr., MD

## 2023-12-14 ENCOUNTER — TELEPHONE (OUTPATIENT)
Dept: PHARMACY | Facility: CLINIC | Age: 58
End: 2023-12-14
Payer: COMMERCIAL

## 2023-12-26 NOTE — H&P
GENERAL SURGERY  OUTPATIENT H&P    REASON FOR VISIT/CC: Infected sebaceous cyst    HPI: Rafael Gautam Jr. is a 58 y.o. male here with evaluation of an infected sebaceous cyst. Patient was lesion on his back for many years however recently became enlarged and painful. Went to his PCP who identified a 3 cm abscess on doxycycline and warm compresses.  He was referred to General surgery for follow-up. Patient reports lesion has improved and redness and drainage. No fevers or chills. Is taking doxycycline as prescribed. Is currently on blood thinners.    I have reviewed the patient's chart including prior progress notes, procedures and testing. Patient was past medical history of CVA, hyperlipidemia, gout, B12 deficiency and CAD.    ROS:   Review of Systems    PROBLEM LIST:  Patient Active Problem List   Diagnosis    Family history of ischemic heart disease (IHD)    History of PTCA     (7/12 RCA BMS 2.75x12)    Mixed hyperlipidemia    Coronary artery disease involving native coronary artery of native heart without angina pectoris    Gout, chronic    B12 deficiency anemia    Hyperuricemia    History of CVA (cerebrovascular accident)         HISTORY  Past Medical History:   Diagnosis Date    Anemia     Anticoagulant long-term use     B12 deficiency anemia     CAD (coronary artery disease)     Carotid atherosclerosis     Essential hypertension 7/8/2015    Family history of ischemic heart disease (IHD) 1/31/2012    Hemorrhoid     History of CVA (cerebrovascular accident) 5/9/2019    History of PTCA     (7/12 RCA BMS 2.75x12) 8/3/2012    LAD/CX- LI. RCA- occluded     HLD (hyperlipidemia)     Hypertension     Hyperuricemia     right foot and left wrist    Sleep apnea     mild ---does not need a C-PAP    Stroke 1/2012       Past Surgical History:   Procedure Laterality Date    COLONOSCOPY N/A 12/11/2017    Procedure: COLONOSCOPY;  Surgeon: Lemuel Vigil MD;  Location: [facility];  Service: Endoscopy;  Laterality: N/A;     CORONARY ANGIOGRAPHY N/A 9/18/2019    Procedure: ANGIOGRAM, CORONARY ARTERY;  Surgeon: Clem Renteria MD;  Location: [facility] CATH;  Service: Cardiology;  Laterality: N/A;    CORONARY ANGIOPLASTY WITH STENT PLACEMENT      7/2012    LEFT HEART CATHETERIZATION Left 9/18/2019    Procedure: Left heart cath;  Surgeon: Clem Renteria MD;  Location: [facility] C[facility];  Service: Cardiology;  Laterality: Left;       Social History     Tobacco Use    Smoking status: Never    Smokeless tobacco: Former     Quit date: 2/24/2006   Substance Use Topics    Alcohol use: No     Comment: quit 20 years ago    Drug use: No       Family History   Problem Relation Age of Onset    Hypertension Mother     Clotting disorder Father     Hypertension Father     Hypertension Sister     Diabetes Maternal Grandmother     Stroke Maternal Grandmother     Hypertension Paternal Aunt     Stroke Paternal Aunt     Hypertension Paternal Uncle     Stroke Paternal Uncle          MEDS:  Current Outpatient Medications on File Prior to Visit   Medication Sig Dispense Refill    allopurinoL (ZYLOPRIM) 300 MG tablet Take 1 tablet (300 mg total) by mouth once daily. 90 tablet 3    amLODIPine (NORVASC) 2.5 MG tablet Take 1 tablet (2.5 mg total) by mouth every evening. 90 tablet 3    aspirin 81 mg Tab Take 81 mg by mouth once daily.      clopidogreL (PLAVIX) 75 mg tablet Take 1 tablet (75 mg total) by mouth once daily. 90 tablet 3    ezetimibe (ZETIA) 10 mg tablet Take 1 tablet (10 mg total) by mouth once daily. 90 tablet 3    nitroGLYCERIN (NITROSTAT) 0.4 MG SL tablet Place 1 tablet (0.4 mg total) under the tongue every 5 (five) minutes as needed. 25 tablet 0    rosuvastatin (CRESTOR) 10 MG tablet Take 1 tablet (10 mg total) by mouth every evening. 90 tablet 3    valsartan-hydrochlorothiazide (DIOVAN-HCT) 80-12.5 mg per tablet Take 1 tablet by mouth once daily. 90 tablet 3     Current Facility-Administered Medications on File Prior to Visit   Medication Dose Route Frequency  Provider Last Rate Last Admin    acetaminophen tablet 650 mg  650 mg Oral Once PRN Zac Wing, NP        albuterol inhaler 2 puff  2 puff Inhalation Q20 Min PRN Zac Wing, KASANDRA        diphenhydrAMINE injection 25 mg  25 mg Intravenous Once PRN Zac Wing, KASANDRA        EPINEPHrine injection 0.3 mg  0.3 mg Intramuscular PRN Zac Wing, KASANDRA        ondansetron disintegrating tablet 4 mg  4 mg Oral Once PRN Zac Wing, NP        sodium chloride 0.9% 500 mL flush bag   Intravenous PRN Zac Wing, NP        sodium chloride 0.9% flush 10 mL  10 mL Intravenous PRN Zac Wing, KASANDRA           ALLERGIES:  Review of patient's allergies indicates:   Allergen Reactions    Codeine Other (See Comments)     Other reaction(s): Shortness of breath  Other reaction(s): Flatulence  Other reaction(s): Difficulty breathing  fainting    Niacin      Other reaction(s): Rash  Other reaction(s): Flushing (skin)    Penicillins      Other reaction(s): Itching  Other reaction(s): Hives  Other reaction(s): Difficulty breathing         VITALS:  Vitals:    12/13/23 1022   BP: 126/81   Temp: 97.2 °F (36.2 °C)         PHYSICAL EXAM:  Physical Exam  Vitals reviewed.   Constitutional:       General: He is not in acute distress.     Appearance: Normal appearance. He is well-developed.   HENT:      Head: Normocephalic and atraumatic.   Eyes:      General: No scleral icterus.  Neck:      Trachea: No tracheal deviation.   Cardiovascular:      Rate and Rhythm: Normal rate and regular rhythm.      Pulses: Normal pulses.   Pulmonary:      Effort: Pulmonary effort is normal. No respiratory distress.      Breath sounds: Normal breath sounds.   Abdominal:      General: There is no distension.      Palpations: Abdomen is soft.      Tenderness: There is no abdominal tenderness.   Musculoskeletal:         General: No swelling or tenderness. Normal range of motion.      Cervical back: Normal range of motion and neck supple. No  rigidity.   Skin:     General: Skin is warm and dry.      Coloration: Skin is not jaundiced.      Findings: No erythema.             Comments: Upper mid back lesion consistent with infected sebaceous cyst which appears to be resolving, mild overlying erythema but no erythema outside the borders, no obvious fluctuance   Neurological:      General: No focal deficit present.      Mental Status: He is alert and oriented to person, place, and time. He is not disoriented.      Motor: No weakness or abnormal muscle tone.   Psychiatric:         Mood and Affect: Mood normal.         Behavior: Behavior normal.         Thought Content: Thought content normal.         Judgment: Judgment normal.           LABS:  Lab Results   Component Value Date    WBC 10.47 08/18/2023    RBC 4.50 (L) 08/18/2023    HGB 13.5 (L) 08/18/2023    HCT 41.1 08/18/2023     08/18/2023     Lab Results   Component Value Date    GLU 81 08/18/2023     08/18/2023    K 4.1 08/18/2023     08/18/2023    CO2 25 08/18/2023    BUN 14 08/18/2023    CREATININE 1.0 08/18/2023    CALCIUM 9.3 08/18/2023     Lab Results   Component Value Date    ALT 28 08/18/2023    AST 26 08/18/2023    ALKPHOS 56 08/18/2023    BILITOT 0.3 08/18/2023     Lab Results   Component Value Date    MG 2.2 03/20/2017           ASSESSMENT & PLAN:  58 y.o. male with with infected sebaceous cyst of the midback  -history and exam consistent with infected sebaceous cyst  -area appears to be adequately controlled with antibiotics and spontaneous drainage and no further incision drainage recommended  -would benefit from excision to prevent recurrence  -patient was currently on blood thinners therefore would like him to hold his Plavix for proximally 5 days prior to excision of the lesion in the office  -will schedule for procedure on 01/03/2024  -continue doxycycline for now  -if he has any significant changes he will contact the office

## 2024-01-03 ENCOUNTER — OFFICE VISIT (OUTPATIENT)
Dept: SURGERY | Facility: CLINIC | Age: 59
End: 2024-01-03
Payer: COMMERCIAL

## 2024-01-03 VITALS — TEMPERATURE: 98 F | HEART RATE: 61 BPM | SYSTOLIC BLOOD PRESSURE: 134 MMHG | DIASTOLIC BLOOD PRESSURE: 77 MMHG

## 2024-01-03 DIAGNOSIS — L08.9 INFECTED SEBACEOUS CYST: Primary | ICD-10-CM

## 2024-01-03 DIAGNOSIS — L72.3 INFECTED SEBACEOUS CYST: Primary | ICD-10-CM

## 2024-01-03 PROCEDURE — 99999 PR PBB SHADOW E&M-EST. PATIENT-LVL II: CPT | Mod: PBBFAC,,, | Performed by: SURGERY

## 2024-01-03 PROCEDURE — 3075F SYST BP GE 130 - 139MM HG: CPT | Mod: CPTII,S$GLB,, | Performed by: SURGERY

## 2024-01-03 PROCEDURE — 99212 OFFICE O/P EST SF 10 MIN: CPT | Mod: S$GLB,,, | Performed by: SURGERY

## 2024-01-03 PROCEDURE — 3078F DIAST BP <80 MM HG: CPT | Mod: CPTII,S$GLB,, | Performed by: SURGERY

## 2024-01-08 NOTE — PROGRESS NOTES
GENERAL SURGERY PROGRESS NOTE    HPI: Rafael Gautam Jr. is a 58 y.o. male here for follow-up of the mid back sebaceous cyst with infection. At previous visit the cyst has spontaneously drained and was improving. Patient has continued antibiotics which he completed.  He was here today for scheduled follow-up. Denies fevers or ongoing significant drainage.  Pain much improved.      VITALS:  Vitals:    01/03/24 1054   BP: 134/77   Pulse: 61   Temp: 98.2 °F (36.8 °C)       Physical Exam  Vitals reviewed.   Constitutional:       General: He is not in acute distress.     Appearance: Normal appearance. He is well-developed.   HENT:      Head: Normocephalic and atraumatic.   Eyes:      General: No scleral icterus.  Neck:      Trachea: No tracheal deviation.   Cardiovascular:      Rate and Rhythm: Normal rate and regular rhythm.      Pulses: Normal pulses.   Pulmonary:      Effort: Pulmonary effort is normal. No respiratory distress.      Breath sounds: Normal breath sounds.   Abdominal:      General: There is no distension.      Palpations: Abdomen is soft.      Tenderness: There is no abdominal tenderness.   Musculoskeletal:         General: No swelling or tenderness. Normal range of motion.      Cervical back: Normal range of motion and neck supple. No rigidity.   Skin:     General: Skin is warm and dry.      Coloration: Skin is not jaundiced.      Findings: No erythema.             Comments: Sebaceous site bowel appears to be adequately drained with no significant residual inflammation or infection   Neurological:      General: No focal deficit present.      Mental Status: He is alert and oriented to person, place, and time. He is not disoriented.      Motor: No weakness or abnormal muscle tone.   Psychiatric:         Mood and Affect: Mood normal.         Behavior: Behavior normal.         Thought Content: Thought content normal.         Judgment: Judgment normal.            ASSESSMENT & PLAN:  58 y.o. male with  upper mid right back sebaceous cyst  -cyst inflammation and infection has resolved  -patient was held his blood thinners  -bedside excision offered the patient agreed proceed, see procedure note below further details  -return to clinic in 2 weeks for suture removal    01/08/2024    Rafael Gautam Jr.  [id number]    PROCEDURE PERFORMED: Excision sebaceous cyst with 1.25 x 0.75 cm    PERFORMING SURGEON: Mina Lassiter Jr    CONSENT:  The risks benefits and alternatives of the procedure were discussed with the patient. The patient was queried for questions and all concerns were addressed.  The patient provided written consent for the procedure.    ANESTHESIA: Lidocaine 2% with epinephrine    INDICATION: History of infected sebaceous cyst    FINDINGS: Cyst removed in totality    PROCEDURE IN DETAIL: Verbal consent obtained. Area prepped and draped. Local anesthetic injected over the lesion. Elliptical incision was made to incorporate the midline sinus pore. We continued our dissection through the dermis into the subcutaneous fat where the cyst was completely excised. Hemostasis was achieved with pressure. The incision was closed with interrupted nonabsorbable suture. A Band-Aid gauze pressure bandage was applied.    EBL: 20 cc    COMPLICATIONS: none

## 2024-01-16 ENCOUNTER — TELEPHONE (OUTPATIENT)
Dept: SURGERY | Facility: CLINIC | Age: 59
End: 2024-01-16
Payer: COMMERCIAL

## 2024-01-16 NOTE — TELEPHONE ENCOUNTER
----- Message from Kimi Ramires, Patient Care Assistant sent at 1/16/2024  8:00 AM CST -----  Regarding: appointment  Contact: pt's wife Evonne  Type: Needs Medical Advice    Who Called:  pt's wife Evonne    Best Call Back Number: [phone] (home) [phone] (work)    Additional Information: pt's wife Evonne states she would like a callback regarding the pt's appointment on 1/17/24. Please call to advise. Thanks!

## 2024-01-17 ENCOUNTER — OFFICE VISIT (OUTPATIENT)
Dept: SURGERY | Facility: CLINIC | Age: 59
End: 2024-01-17
Payer: COMMERCIAL

## 2024-01-17 VITALS — SYSTOLIC BLOOD PRESSURE: 152 MMHG | DIASTOLIC BLOOD PRESSURE: 94 MMHG | TEMPERATURE: 99 F | HEART RATE: 62 BPM

## 2024-01-17 DIAGNOSIS — L72.3 INFECTED SEBACEOUS CYST: Primary | ICD-10-CM

## 2024-01-17 DIAGNOSIS — L08.9 INFECTED SEBACEOUS CYST: Primary | ICD-10-CM

## 2024-01-17 DIAGNOSIS — L02.212 ABSCESS OF BACK: ICD-10-CM

## 2024-01-17 PROCEDURE — 3077F SYST BP >= 140 MM HG: CPT | Mod: CPTII,S$GLB,, | Performed by: SURGERY

## 2024-01-17 PROCEDURE — 1159F MED LIST DOCD IN RCRD: CPT | Mod: CPTII,S$GLB,, | Performed by: SURGERY

## 2024-01-17 PROCEDURE — 3080F DIAST BP >= 90 MM HG: CPT | Mod: CPTII,S$GLB,, | Performed by: SURGERY

## 2024-01-17 PROCEDURE — 99024 POSTOP FOLLOW-UP VISIT: CPT | Mod: S$GLB,,, | Performed by: SURGERY

## 2024-01-17 PROCEDURE — 99999 PR PBB SHADOW E&M-EST. PATIENT-LVL II: CPT | Mod: PBBFAC,,, | Performed by: SURGERY

## 2024-01-17 NOTE — PROGRESS NOTES
GENERAL SURGERY      HPI: Rafael Gautam Jr. is a 58 y.o. male status post excision of infected sebaceous cyst of the back.  Here for suture removal. No significant complaints.    VITALS:  Vitals:    01/17/24 0845   BP: (!) 152/94   Pulse: 62   Temp: 98.8 °F (37.1 °C)       PHYSICAL EXAM:  Mid back cyst excision site well healed with sutures in place, no signs of breakdown or infection      ASSESSMENT & PLAN:  58 y.o. male s/p sebaceous cyst removal  -healed well  -sutures removed  -return to clinic p.r.n.

## 2024-01-28 ENCOUNTER — TELEPHONE (OUTPATIENT)
Dept: PHARMACY | Facility: CLINIC | Age: 59
End: 2024-01-28
Payer: COMMERCIAL

## 2024-04-04 ENCOUNTER — PATIENT MESSAGE (OUTPATIENT)
Dept: ADMINISTRATIVE | Facility: HOSPITAL | Age: 59
End: 2024-04-04
Payer: COMMERCIAL

## 2024-10-24 ENCOUNTER — HOSPITAL ENCOUNTER (OUTPATIENT)
Dept: RADIOLOGY | Facility: HOSPITAL | Age: 59
Discharge: HOME | End: 2024-10-24
Payer: COMMERCIAL

## 2024-10-24 ENCOUNTER — OFFICE VISIT (OUTPATIENT)
Dept: FAMILY MEDICINE | Facility: CLINIC | Age: 59
End: 2024-10-24
Payer: COMMERCIAL

## 2024-10-24 VITALS
HEART RATE: 63 BPM | SYSTOLIC BLOOD PRESSURE: 138 MMHG | BODY MASS INDEX: 33.41 KG/M2 | WEIGHT: 207 LBS | DIASTOLIC BLOOD PRESSURE: 72 MMHG | OXYGEN SATURATION: 97 %

## 2024-10-24 DIAGNOSIS — M25.562 ACUTE PAIN OF LEFT KNEE: ICD-10-CM

## 2024-10-24 DIAGNOSIS — M25.562 ACUTE PAIN OF LEFT KNEE: Primary | ICD-10-CM

## 2024-10-24 PROCEDURE — 3075F SYST BP GE 130 - 139MM HG: CPT | Mod: CPTII,S$GLB,,

## 2024-10-24 PROCEDURE — 3078F DIAST BP <80 MM HG: CPT | Mod: CPTII,S$GLB,,

## 2024-10-24 PROCEDURE — 3008F BODY MASS INDEX DOCD: CPT | Mod: CPTII,S$GLB,,

## 2024-10-24 PROCEDURE — 73560 X-RAY EXAM OF KNEE 1 OR 2: CPT | Mod: 26,LT,, | Performed by: RADIOLOGY

## 2024-10-24 PROCEDURE — 1159F MED LIST DOCD IN RCRD: CPT | Mod: CPTII,S$GLB,,

## 2024-10-24 PROCEDURE — 99213 OFFICE O/P EST LOW 20 MIN: CPT | Mod: S$GLB,,,

## 2024-10-24 PROCEDURE — 73560 X-RAY EXAM OF KNEE 1 OR 2: CPT | Mod: TC,FY,PO,LT

## 2024-10-24 PROCEDURE — 99999 PR PBB SHADOW E&M-EST. PATIENT-LVL V: CPT | Mod: PBBFAC,,,

## 2024-10-24 RX ORDER — METHYLPREDNISOLONE 4 MG/1
TABLET ORAL
Qty: 21 EACH | Refills: 0 | Status: SHIPPED | OUTPATIENT
Start: 2024-10-24 | End: 2024-11-14

## 2024-10-24 RX ORDER — MELOXICAM 15 MG/1
15 TABLET ORAL DAILY
Qty: 14 TABLET | Refills: 0 | Status: SHIPPED | OUTPATIENT
Start: 2024-10-24 | End: 2024-11-07

## 2024-10-24 NOTE — PROGRESS NOTES
Name: Rafael Gautam Jr.  MRN: [id number]  : 1965  PCP: Pj Church MD    HPI    Patient follows with Dr. Church, new to me. He presents for left knee pain since Saturday. He complains of medial left sided knee pain. He reports waking up  morning and pain was intense. He reports inability to extend his knee due to pain. He denies any numbness or tingling to his legs. He has been using tylenol and aleve with little relief in his symptoms.     Review of Systems   Cardiovascular:  Negative for chest pain.   Gastrointestinal:  Negative for nausea and vomiting.   Musculoskeletal:  Positive for arthralgias.       Patient Active Problem List   Diagnosis    Family history of ischemic heart disease (IHD)    History of PTCA     ( RCA BMS 2.75x12)    Mixed hyperlipidemia    Coronary artery disease involving native coronary artery of native heart without angina pectoris    Gout, chronic    B12 deficiency anemia    Hyperuricemia    History of CVA (cerebrovascular accident)       Vitals:    10/24/24 1126   BP: 138/72   Pulse: 63       Physical Exam  Constitutional:       General: He is not in acute distress.     Appearance: He is well-developed.   HENT:      Head: Normocephalic and atraumatic.      Right Ear: External ear normal.      Left Ear: External ear normal.   Eyes:      Conjunctiva/sclera: Conjunctivae normal.      Pupils: Pupils are equal, round, and reactive to light.   Neck:      Thyroid: No thyromegaly.   Cardiovascular:      Rate and Rhythm: Normal rate and regular rhythm.      Pulses: Normal pulses.      Heart sounds: Normal heart sounds, S1 normal and S2 normal.   Pulmonary:      Effort: Pulmonary effort is normal. No respiratory distress.      Breath sounds: Normal breath sounds.   Chest:      Chest wall: No tenderness.   Musculoskeletal:         General: Tenderness present. No swelling. Normal range of motion.      Cervical back: Normal range of motion and neck supple.      Comments:  Left knee pain with extension to medial aspect   Skin:     General: Skin is warm and dry.      Coloration: Skin is not jaundiced or pale.   Neurological:      General: No focal deficit present.      Mental Status: He is alert and oriented to person, place, and time.      Cranial Nerves: No cranial nerve deficit.   Psychiatric:         Mood and Affect: Mood normal.         Behavior: Behavior normal.         1. Acute pain of left knee  -     Ambulatory referral/consult to Orthopedics; Future; Expected date: 10/31/2024  -     X-Ray Knee 1 or 2 View Left; Future; Expected date: 10/24/2024  -     meloxicam (MOBIC) 15 MG tablet; Take 1 tablet (15 mg total) by mouth once daily. for 14 days  Dispense: 14 tablet; Refill: 0  -     methylPREDNISolone (MEDROL DOSEPACK) 4 mg tablet; use as directed  Dispense: 21 each; Refill: 0        Follow up as needed or if symptoms fail to improve      ARMAAN Frank  10/24/2024

## 2024-10-24 NOTE — LETTER
October 24, 2024      [facility]  [address] [place] [address]  Phone: [phone]  Fax: [phone]       Patient: Rafael Gautam   YOB: 1965  Date of Visit: 10/24/2024    To Whom It May Concern:    Tez Gautam  was at [facility] on 10/24/2024. The patient may return to work/school on 10/28/24 with no restrictions. If you have any questions or concerns, or if I can be of further assistance, please do not hesitate to contact me.    Sincerely,    Jorge Del Valle PA-C

## 2024-10-30 ENCOUNTER — OFFICE VISIT (OUTPATIENT)
Dept: ORTHOPEDICS | Facility: CLINIC | Age: 59
End: 2024-10-30
Payer: COMMERCIAL

## 2024-10-30 VITALS — BODY MASS INDEX: 33.27 KG/M2 | RESPIRATION RATE: 18 BRPM | WEIGHT: 207 LBS | HEIGHT: 66 IN

## 2024-10-30 DIAGNOSIS — S83.242A OTHER TEAR OF MEDIAL MENISCUS OF LEFT KNEE AS CURRENT INJURY, INITIAL ENCOUNTER: Primary | ICD-10-CM

## 2024-10-30 DIAGNOSIS — M25.562 ACUTE PAIN OF LEFT KNEE: ICD-10-CM

## 2024-10-30 PROCEDURE — 3008F BODY MASS INDEX DOCD: CPT | Mod: CPTII,S$GLB,, | Performed by: ORTHOPAEDIC SURGERY

## 2024-10-30 PROCEDURE — 20610 DRAIN/INJ JOINT/BURSA W/O US: CPT | Mod: LT,S$GLB,, | Performed by: ORTHOPAEDIC SURGERY

## 2024-10-30 PROCEDURE — 1159F MED LIST DOCD IN RCRD: CPT | Mod: CPTII,S$GLB,, | Performed by: ORTHOPAEDIC SURGERY

## 2024-10-30 PROCEDURE — 99204 OFFICE O/P NEW MOD 45 MIN: CPT | Mod: 25,S$GLB,, | Performed by: ORTHOPAEDIC SURGERY

## 2024-10-30 PROCEDURE — 99999 PR PBB SHADOW E&M-EST. PATIENT-LVL IV: CPT | Mod: PBBFAC,,, | Performed by: ORTHOPAEDIC SURGERY

## 2024-10-30 RX ORDER — TRIAMCINOLONE ACETONIDE 40 MG/ML
40 INJECTION, SUSPENSION INTRA-ARTICULAR; INTRAMUSCULAR
Status: DISCONTINUED | OUTPATIENT
Start: 2024-10-30 | End: 2024-10-30 | Stop reason: HOSPADM

## 2024-10-30 RX ADMIN — TRIAMCINOLONE ACETONIDE 40 MG: 40 INJECTION, SUSPENSION INTRA-ARTICULAR; INTRAMUSCULAR at 08:10

## 2024-12-02 DIAGNOSIS — E79.0 HYPERURICEMIA: ICD-10-CM

## 2024-12-02 NOTE — TELEPHONE ENCOUNTER
Care Due:                  Date            Visit Type   Department     Provider  --------------------------------------------------------------------------------                                EP -                              PRIMARY      [facility] FAMILY  Last Visit: 08-      CARE ([facility])   MEDICINE       CELSO RODRIGUEZ  Next Visit: None Scheduled  None         None Found                                                            Last  Test          Frequency    Reason                     Performed    Due Date  --------------------------------------------------------------------------------    Office Visit  15 months..  allopurinoL..............  08-   11-    CBC.........  12 months..  allopurinoL..............  08- 08-    CMP.........  12 months..  allopurinoL..............  08- 08-    Uric Acid...  12 months..  allopurinoL..............  08- 08-    [facility] Embedded Care Due Messages. Reference number: [id number].   12/02/2024 2:19:17 PM CST

## 2024-12-03 RX ORDER — ALLOPURINOL 300 MG/1
300 TABLET ORAL DAILY
Qty: 90 TABLET | Refills: 3 | Status: SHIPPED | OUTPATIENT
Start: 2024-12-03

## 2024-12-03 NOTE — TELEPHONE ENCOUNTER
Refill Routing Note   Medication(s) are not appropriate for processing by [facility] for the following reason(s):        Required labs outdated  Patient not seen by provider within 15 months    [facility] action(s):  Defer   Requires appointment : Yes     Requires labs : Yes             Appointments  past 12m or future 3m with PCP    Date Provider   Last Visit   8/18/2023 Pj Church MD   Next Visit   Visit date not found Pj Church MD   ED visits in past 90 days: 0        Note composed:10:25 AM 12/03/2024

## 2025-02-26 ENCOUNTER — TELEPHONE (OUTPATIENT)
Dept: PHARMACY | Facility: CLINIC | Age: 60
End: 2025-02-26
Payer: COMMERCIAL

## 2025-02-26 NOTE — TELEPHONE ENCOUNTER
[facility]/Population Health Chart Review & Patient Outreach Details For Medication Adherence Project    Reason for Outreach Encounter: 3rd Party payor non-compliance report ([employer], [employer], C, etc)  2.  Patient Outreach Method: Reviewed patient chart   3.   Medication in question:    Hypertension Medications              amLODIPine (NORVASC) 5 MG tablet Take 1 tablet (5 mg total) by mouth every evening.    nitroGLYCERIN (NITROSTAT) 0.4 MG SL tablet Place 1 tablet (0.4 mg total) under the tongue every 5 (five) minutes as needed.    valsartan-hydrochlorothiazide (DIOVAN-HCT) 80-12.5 mg per tablet Take 1 tablet by mouth once daily.              LF 30 ds 2/20/25      4.  Reviewed and or Updates Made To: Patient Chart  5. Outreach Outcomes and/or actions taken: Patient filled medication and is on track to be adherent  Additional Notes:

## 2025-03-07 ENCOUNTER — TELEPHONE (OUTPATIENT)
Dept: PHARMACY | Facility: CLINIC | Age: 60
End: 2025-03-07
Payer: COMMERCIAL

## 2025-03-07 NOTE — TELEPHONE ENCOUNTER
[facility]/Population Health Chart Review & Patient Outreach Details For Medication Adherence Project    Reason for Outreach Encounter: 3rd Party payor non-compliance report ([employer], [employer]BS, [employer], etc)  2.  Patient Outreach Method: Reviewed patient chart   3.   Medication in question:    Hypertension Medications              amLODIPine (NORVASC) 5 MG tablet Take 1 tablet (5 mg total) by mouth every evening.    nitroGLYCERIN (NITROSTAT) 0.4 MG SL tablet Place 1 tablet (0.4 mg total) under the tongue every 5 (five) minutes as needed.    valsartan-hydrochlorothiazide (DIOVAN-HCT) 80-12.5 mg per tablet Take 1 tablet by mouth once daily.              Hyperlipidemia Medications              ezetimibe (ZETIA) 10 mg tablet Take 1 tablet (10 mg total) by mouth once daily.    rosuvastatin (CRESTOR) 10 MG tablet Take 1 tablet (10 mg total) by mouth every evening.                  rosuvastatin and valsartan-hydrochlorothiazide last filled  2/20/25 for 30 day supply        4.  Reviewed and or Updates Made To: Patient Chart  5. Outreach Outcomes and/or actions taken: Patient filled medication and is on track to be adherent  Additional Notes:

## 2025-03-26 ENCOUNTER — OFFICE VISIT (OUTPATIENT)
Dept: ORTHOPEDICS | Facility: CLINIC | Age: 60
End: 2025-03-26
Payer: COMMERCIAL

## 2025-03-26 VITALS — BODY MASS INDEX: 33.27 KG/M2 | WEIGHT: 207 LBS | HEIGHT: 66 IN

## 2025-03-26 DIAGNOSIS — S83.242D OTHER TEAR OF MEDIAL MENISCUS OF LEFT KNEE AS CURRENT INJURY, SUBSEQUENT ENCOUNTER: Primary | ICD-10-CM

## 2025-03-26 PROCEDURE — 1159F MED LIST DOCD IN RCRD: CPT | Mod: CPTII,S$GLB,, | Performed by: ORTHOPAEDIC SURGERY

## 2025-03-26 PROCEDURE — 20610 DRAIN/INJ JOINT/BURSA W/O US: CPT | Mod: LT,S$GLB,, | Performed by: ORTHOPAEDIC SURGERY

## 2025-03-26 PROCEDURE — 3008F BODY MASS INDEX DOCD: CPT | Mod: CPTII,S$GLB,, | Performed by: ORTHOPAEDIC SURGERY

## 2025-03-26 PROCEDURE — 99999 PR PBB SHADOW E&M-EST. PATIENT-LVL III: CPT | Mod: PBBFAC,,, | Performed by: ORTHOPAEDIC SURGERY

## 2025-03-26 PROCEDURE — 1160F RVW MEDS BY RX/DR IN RCRD: CPT | Mod: CPTII,S$GLB,, | Performed by: ORTHOPAEDIC SURGERY

## 2025-03-26 PROCEDURE — 99214 OFFICE O/P EST MOD 30 MIN: CPT | Mod: 25,S$GLB,, | Performed by: ORTHOPAEDIC SURGERY

## 2025-03-26 RX ORDER — TRIAMCINOLONE ACETONIDE 40 MG/ML
40 INJECTION, SUSPENSION INTRA-ARTICULAR; INTRAMUSCULAR
Status: DISCONTINUED | OUTPATIENT
Start: 2025-03-26 | End: 2025-03-26 | Stop reason: HOSPADM

## 2025-03-26 RX ADMIN — TRIAMCINOLONE ACETONIDE 40 MG: 40 INJECTION, SUSPENSION INTRA-ARTICULAR; INTRAMUSCULAR at 03:03

## 2025-03-26 NOTE — PROCEDURES
Large Joint Aspiration/Injection: L knee    Date/Time: 3/26/2025 3:15 PM    Performed by: Josse Chacon MD  Authorized by: Josse Chacon MD    Consent Done?:  Yes (Verbal)  Indications:  Pain  Site marked: the procedure site was marked    Timeout: prior to procedure the correct patient, procedure, and site was verified    Local anesthetic:  Lidocaine 1% without epinephrine and bupivacaine 0.25% without epinephrine  Anesthetic total (ml):  6      Details:  Needle Size:  20 G  Approach:  Anterolateral  Location:  Knee  Site:  L knee  Medications:  40 mg triamcinolone acetonide 40 mg/mL  Patient tolerance:  Patient tolerated the procedure well with no immediate complications

## 2025-03-26 NOTE — PROGRESS NOTES
Past Medical History:   Diagnosis Date    Anemia     Anticoagulant long-term use     B12 deficiency anemia     CAD (coronary artery disease)     Carotid atherosclerosis     Essential hypertension 7/8/2015    Family history of ischemic heart disease (IHD) 1/31/2012    Hemorrhoid     History of CVA (cerebrovascular accident) 5/9/2019    History of PTCA     (7/12 RCA BMS 2.75x12) 8/3/2012    LAD/CX- LI. RCA- occluded     HLD (hyperlipidemia)     Hypertension     Hyperuricemia     right foot and left wrist    Sleep apnea     mild ---does not need a C-PAP    Stroke 1/2012       Past Surgical History:   Procedure Laterality Date    COLONOSCOPY N/A 12/11/2017    Procedure: COLONOSCOPY;  Surgeon: Lemuel Vigil MD;  Location: [facility] ENDO;  Service: Endoscopy;  Laterality: N/A;    CORONARY ANGIOGRAPHY N/A 9/18/2019    Procedure: ANGIOGRAM, CORONARY ARTERY;  Surgeon: Clem Renteria MD;  Location: [facility] CATH;  Service: Cardiology;  Laterality: N/A;    CORONARY ANGIOPLASTY WITH STENT PLACEMENT      7/2012    LEFT HEART CATHETERIZATION Left 9/18/2019    Procedure: Left heart cath;  Surgeon: Clem Renteria MD;  Location: [facility] CATH;  Service: Cardiology;  Laterality: Left;       Current Outpatient Medications   Medication Sig    allopurinoL (ZYLOPRIM) 300 MG tablet Take 1 tablet (300 mg total) by mouth once daily.    amLODIPine (NORVASC) 5 MG tablet Take 1 tablet (5 mg total) by mouth every evening.    aspirin 81 mg Tab Take 81 mg by mouth once daily.    clopidogreL (PLAVIX) 75 mg tablet Take 1 tablet (75 mg total) by mouth once daily.    ezetimibe (ZETIA) 10 mg tablet Take 1 tablet (10 mg total) by mouth once daily.    nitroGLYCERIN (NITROSTAT) 0.4 MG SL tablet Place 1 tablet (0.4 mg total) under the tongue every 5 (five) minutes as needed.    rosuvastatin (CRESTOR) 10 MG tablet Take 1 tablet (10 mg total) by mouth every evening.    valsartan-hydrochlorothiazide (DIOVAN-HCT) 80-12.5 mg per tablet Take 1 tablet by mouth once  daily.     Current Facility-Administered Medications   Medication    acetaminophen tablet 650 mg    albuterol inhaler 2 puff    diphenhydrAMINE injection 25 mg    EPINEPHrine injection 0.3 mg    ondansetron disintegrating tablet 4 mg    sodium chloride 0.9% 500 mL flush bag    sodium chloride 0.9% flush 10 mL       Review of patient's allergies indicates:   Allergen Reactions    Codeine Other (See Comments)     Other reaction(s): Shortness of breath  Other reaction(s): Flatulence  Other reaction(s): Difficulty breathing  fainting    Niacin      Other reaction(s): Rash  Other reaction(s): Flushing (skin)    Penicillins      Other reaction(s): Itching  Other reaction(s): Hives  Other reaction(s): Difficulty breathing       Family History   Problem Relation Name Age of Onset    Hypertension Mother      Clotting disorder Father      Hypertension Father      Hypertension Sister      Diabetes Maternal Grandmother      Stroke Maternal Grandmother      Hypertension Paternal Aunt      Stroke Paternal Aunt      Hypertension Paternal Uncle      Stroke Paternal Uncle         Social History     Socioeconomic History    Marital status:     Number of children: 3   Occupational History    Occupation: [employer]     Employer: [employer]   Tobacco Use    Smoking status: Never    Smokeless tobacco: Former     Quit date: 2/24/2006   Substance and Sexual Activity    Alcohol use: No     Comment: quit 20 years ago    Drug use: No    Sexual activity: Yes     Partners: Female     Birth control/protection: None       Chief Complaint: No chief complaint on file.      History of present illness:  59-year-old male seen for left knee pain.  Patient thinks that he possibly injured or aggravated his left knee around October 19, 2024.  Patient was walking down some bleachers and kind of stumbled, twisting his knee.  Did not notice a lot of pain at that time but had pain and soreness over the medial aspect of his knee the following morning.   Pain is with deep squatting and twisting.  We did a cortisone injection on him about 6 months ago.  Worked great until about 2 weeks ago.  Knee swelled up again and started hurting again along the medial aspect.        Review of Systems:  Musculoskeletal:  See HPI        Physical Examination:    Vital Signs:  There were no vitals filed for this visit.    There is no height or weight on file to calculate BMI.    This a well-developed, well nourished patient in no acute distress.  They are alert and oriented and cooperative to examination.  Pt. walks without an antalgic gait.      Examination of the left knee shows no rashes or erythema. There are no masses ecchymosis and a moderate effusion. Patient has full range of motion from 0-130°. Patient is nontender to palpation over lateral joint line and moderately tender to palpation over the medial joint line.  Knee is stable to varus and valgus stress. 5 out of 5 motor strength. Palpable distal pulses. Intact light touch sensation. Negative Patellofemoral crepitus      X-rays:  X-rays of the left knee are available for review which show some very minimal arthritic change         Assessment:  Left medial meniscal tear with effusion    Plan:  I reviewed the findings with him today.  We talked about meniscal tears.  We talked about activity avoidance including avoiding a lot of squatting and twisting.  Patient agreed to try a cortisone injection to see if it would get better.  Next step would be an MRI if it continues to bother him or if he starts to develop more mechanical symptoms.  Want him to ice and elevated as well.  Can take NSAIDs and Tylenol for the pain            All previous pertinent notes including ER visits, physical therapy visits, other orthopedic visits as well as other care for the same musculoskeletal problem were reviewed.  All pertinent lab values and previous imaging was reviewed pertinent to the current visit.    This note was created using That{img}  voice recognition software that occasionally misinterpreted phrases or words.    Consult note is delivered via Epic messaging service.\

## 2025-04-08 ENCOUNTER — LAB VISIT (OUTPATIENT)
Dept: LAB | Facility: HOSPITAL | Age: 60
End: 2025-04-08
Attending: FAMILY MEDICINE
Payer: COMMERCIAL

## 2025-04-08 ENCOUNTER — OFFICE VISIT (OUTPATIENT)
Dept: FAMILY MEDICINE | Facility: CLINIC | Age: 60
End: 2025-04-08
Payer: COMMERCIAL

## 2025-04-08 VITALS
WEIGHT: 203.69 LBS | BODY MASS INDEX: 32.73 KG/M2 | SYSTOLIC BLOOD PRESSURE: 132 MMHG | HEIGHT: 66 IN | OXYGEN SATURATION: 98 % | HEART RATE: 65 BPM | DIASTOLIC BLOOD PRESSURE: 70 MMHG

## 2025-04-08 DIAGNOSIS — I25.10 CORONARY ARTERY DISEASE INVOLVING NATIVE CORONARY ARTERY OF NATIVE HEART WITHOUT ANGINA PECTORIS: ICD-10-CM

## 2025-04-08 DIAGNOSIS — I10 ESSENTIAL HYPERTENSION: ICD-10-CM

## 2025-04-08 DIAGNOSIS — M25.562 CHRONIC PAIN OF LEFT KNEE: ICD-10-CM

## 2025-04-08 DIAGNOSIS — Z00.00 PREVENTATIVE HEALTH CARE: Primary | ICD-10-CM

## 2025-04-08 DIAGNOSIS — E79.0 HYPERURICEMIA: ICD-10-CM

## 2025-04-08 DIAGNOSIS — E78.2 MIXED HYPERLIPIDEMIA: ICD-10-CM

## 2025-04-08 DIAGNOSIS — Z00.00 PREVENTATIVE HEALTH CARE: ICD-10-CM

## 2025-04-08 DIAGNOSIS — G89.29 CHRONIC PAIN OF LEFT KNEE: ICD-10-CM

## 2025-04-08 DIAGNOSIS — Z86.73 HISTORY OF CVA (CEREBROVASCULAR ACCIDENT): ICD-10-CM

## 2025-04-08 LAB
ABSOLUTE EOSINOPHIL (OHS): 0.22 K/UL
ABSOLUTE MONOCYTE (OHS): 0.73 K/UL (ref 0.3–1)
ABSOLUTE NEUTROPHIL COUNT (OHS): 4.95 K/UL (ref 1.8–7.7)
ALBUMIN SERPL BCP-MCNC: 3.8 G/DL (ref 3.5–5.2)
ALP SERPL-CCNC: 55 UNIT/L (ref 40–150)
ALT SERPL W/O P-5'-P-CCNC: 19 UNIT/L (ref 10–44)
ANION GAP (OHS): 6 MMOL/L (ref 8–16)
AST SERPL-CCNC: 18 UNIT/L (ref 11–45)
BASOPHILS # BLD AUTO: 0.04 K/UL
BASOPHILS NFR BLD AUTO: 0.4 %
BILIRUB SERPL-MCNC: 0.3 MG/DL (ref 0.1–1)
BUN SERPL-MCNC: 20 MG/DL (ref 6–20)
CALCIUM SERPL-MCNC: 9.2 MG/DL (ref 8.7–10.5)
CHLORIDE SERPL-SCNC: 105 MMOL/L (ref 95–110)
CHOLEST SERPL-MCNC: 126 MG/DL (ref 120–199)
CHOLEST/HDLC SERPL: 3.5 {RATIO} (ref 2–5)
CO2 SERPL-SCNC: 28 MMOL/L (ref 23–29)
CREAT SERPL-MCNC: 0.9 MG/DL (ref 0.5–1.4)
EAG (OHS): 120 MG/DL (ref 68–131)
ERYTHROCYTE [DISTWIDTH] IN BLOOD BY AUTOMATED COUNT: 12.9 % (ref 11.5–14.5)
GFR SERPLBLD CREATININE-BSD FMLA CKD-EPI: >60 ML/MIN/1.73/M2
GLUCOSE SERPL-MCNC: 102 MG/DL (ref 70–110)
HBA1C MFR BLD: 5.8 % (ref 4–5.6)
HCT VFR BLD AUTO: 43.2 % (ref 40–54)
HDLC SERPL-MCNC: 36 MG/DL (ref 40–75)
HDLC SERPL: 28.6 % (ref 20–50)
HGB BLD-MCNC: 13.6 GM/DL (ref 14–18)
IMM GRANULOCYTES # BLD AUTO: 0.03 K/UL (ref 0–0.04)
IMM GRANULOCYTES NFR BLD AUTO: 0.3 % (ref 0–0.5)
LDLC SERPL CALC-MCNC: 75.4 MG/DL (ref 63–159)
LYMPHOCYTES # BLD AUTO: 3.67 K/UL (ref 1–4.8)
MCH RBC QN AUTO: 29.3 PG (ref 27–31)
MCHC RBC AUTO-ENTMCNC: 31.5 G/DL (ref 32–36)
MCV RBC AUTO: 93 FL (ref 82–98)
NONHDLC SERPL-MCNC: 90 MG/DL
NUCLEATED RBC (/100WBC) (OHS): 0 /100 WBC
PLATELET # BLD AUTO: 290 K/UL (ref 150–450)
PMV BLD AUTO: 8.7 FL (ref 9.2–12.9)
POTASSIUM SERPL-SCNC: 4.8 MMOL/L (ref 3.5–5.1)
PROT SERPL-MCNC: 7 GM/DL (ref 6–8.4)
PSA SERPL-MCNC: 0.68 NG/ML
RBC # BLD AUTO: 4.64 M/UL (ref 4.6–6.2)
RELATIVE EOSINOPHIL (OHS): 2.3 %
RELATIVE LYMPHOCYTE (OHS): 38.1 % (ref 18–48)
RELATIVE MONOCYTE (OHS): 7.6 % (ref 4–15)
RELATIVE NEUTROPHIL (OHS): 51.3 % (ref 38–73)
SODIUM SERPL-SCNC: 139 MMOL/L (ref 136–145)
TRIGL SERPL-MCNC: 73 MG/DL (ref 30–150)
URATE SERPL-MCNC: 6.2 MG/DL (ref 3.4–7)
WBC # BLD AUTO: 9.64 K/UL (ref 3.9–12.7)

## 2025-04-08 PROCEDURE — 80053 COMPREHEN METABOLIC PANEL: CPT

## 2025-04-08 PROCEDURE — 84153 ASSAY OF PSA TOTAL: CPT

## 2025-04-08 PROCEDURE — 3078F DIAST BP <80 MM HG: CPT | Mod: CPTII,S$GLB,, | Performed by: FAMILY MEDICINE

## 2025-04-08 PROCEDURE — 99396 PREV VISIT EST AGE 40-64: CPT | Mod: S$GLB,,, | Performed by: FAMILY MEDICINE

## 2025-04-08 PROCEDURE — 1159F MED LIST DOCD IN RCRD: CPT | Mod: CPTII,S$GLB,, | Performed by: FAMILY MEDICINE

## 2025-04-08 PROCEDURE — 3008F BODY MASS INDEX DOCD: CPT | Mod: CPTII,S$GLB,, | Performed by: FAMILY MEDICINE

## 2025-04-08 PROCEDURE — 83036 HEMOGLOBIN GLYCOSYLATED A1C: CPT

## 2025-04-08 PROCEDURE — 99999 PR PBB SHADOW E&M-EST. PATIENT-LVL III: CPT | Mod: PBBFAC,,, | Performed by: FAMILY MEDICINE

## 2025-04-08 PROCEDURE — 80061 LIPID PANEL: CPT

## 2025-04-08 PROCEDURE — 84550 ASSAY OF BLOOD/URIC ACID: CPT

## 2025-04-08 PROCEDURE — 36415 COLL VENOUS BLD VENIPUNCTURE: CPT | Mod: PO

## 2025-04-08 PROCEDURE — 3075F SYST BP GE 130 - 139MM HG: CPT | Mod: CPTII,S$GLB,, | Performed by: FAMILY MEDICINE

## 2025-04-08 PROCEDURE — 85025 COMPLETE CBC W/AUTO DIFF WBC: CPT

## 2025-04-08 RX ORDER — METHYLPREDNISOLONE 4 MG/1
TABLET ORAL
Qty: 21 EACH | Refills: 0 | Status: SHIPPED | OUTPATIENT
Start: 2025-04-08 | End: 2025-04-29

## 2025-04-08 NOTE — PROGRESS NOTES
Subjective:       Patient ID: Rafael Gautam Jr. is a 59 y.o. male.    Chief Complaint: Preventative health care (Physical)    HPI Comments: Here today for annual exam and f/u on chronic health issues    Requesting Medrol pack to help with ongoing left knee pain thought to be related to meniscus injury.    He reports overall feeling well.  HTN - tolerating Diovan HCT  and amlodipine 5mg daily; home /70  HLD - tolerating Crestor 10mg and Zetia 10mg daily  CAD s/p cardiac stents - tolerating Aspirin 81mg, Plavix 75mg daily  Gout - uses indomethacin PRN flares; no flares in the last 6 months; He is taking allopurinol 300mg daily  b12 deficiency - stopped oral B12       Past Medical History:   Diagnosis Date    Anemia     Anticoagulant long-term use     B12 deficiency anemia     CAD (coronary artery disease)     Carotid atherosclerosis     Essential hypertension 7/8/2015    Family history of ischemic heart disease (IHD) 1/31/2012    Hemorrhoid     History of CVA (cerebrovascular accident) 5/9/2019    History of PTCA     (7/12 RCA BMS 2.75x12) 8/3/2012    LAD/CX- LI. RCA- occluded     HLD (hyperlipidemia)     Hypertension     Hyperuricemia     right foot and left wrist    Sleep apnea     mild ---does not need a C-PAP    Stroke 1/2012     Current Outpatient Medications on File Prior to Visit   Medication Sig Dispense Refill    allopurinoL (ZYLOPRIM) 300 MG tablet Take 1 tablet (300 mg total) by mouth once daily. 90 tablet 3    amLODIPine (NORVASC) 5 MG tablet Take 1 tablet (5 mg total) by mouth every evening. 90 tablet 4    aspirin 81 mg Tab Take 81 mg by mouth once daily.      clopidogreL (PLAVIX) 75 mg tablet Take 1 tablet (75 mg total) by mouth once daily. 90 tablet 3    ezetimibe (ZETIA) 10 mg tablet Take 1 tablet (10 mg total) by mouth once daily. 90 tablet 4    nitroGLYCERIN (NITROSTAT) 0.4 MG SL tablet Place 1 tablet (0.4 mg total) under the tongue every 5 (five) minutes as needed. 25 tablet 0     rosuvastatin (CRESTOR) 10 MG tablet Take 1 tablet (10 mg total) by mouth every evening. 90 tablet 4    valsartan-hydrochlorothiazide (DIOVAN-HCT) 80-12.5 mg per tablet Take 1 tablet by mouth once daily. 90 tablet 4     Current Facility-Administered Medications on File Prior to Visit   Medication Dose Route Frequency Provider Last Rate Last Admin    acetaminophen tablet 650 mg  650 mg Oral Once PRN Zac Wing, FNP        albuterol inhaler 2 puff  2 puff Inhalation Q20 Min PRN Zac Wing, FNP        diphenhydrAMINE injection 25 mg  25 mg Intravenous Once PRN Zac Wing, FNP        EPINEPHrine injection 0.3 mg  0.3 mg Intramuscular PRN Zac Wing, FNP        ondansetron disintegrating tablet 4 mg  4 mg Oral Once PRN Zac Wing, FNP        sodium chloride 0.9% 500 mL flush bag   Intravenous PRN Zac Wing, FNP        sodium chloride 0.9% flush 10 mL  10 mL Intravenous PRN Zac Wing, FNP           Review of Systems   Constitutional: Negative for fever, appetite change and unexpected weight change.   HENT: Negative for ear pain, nosebleeds, congestion, sore throat, rhinorrhea, trouble swallowing, neck pain, postnasal drip, sinus pressure and ear discharge.    Eyes: Negative for pain, discharge, redness and visual disturbance.   Respiratory: Negative for apnea, cough, chest tightness, shortness of breath and wheezing.    Cardiovascular: Negative for chest pain, palpitations and leg swelling.   Gastrointestinal: Negative for nausea, vomiting, abdominal pain, diarrhea, constipation, blood in stool and abdominal distention.   Genitourinary: Negative for dysuria, urgency, frequency, hematuria, flank pain, scrotal swelling, difficulty urinating and testicular pain.   Skin: Negative for color change, rash and wound.   Neurological: Negative for dizziness, tremors, seizures, syncope, speech difficulty, weakness, light-headedness and numbness.   Hematological: Negative for adenopathy.  "Does not bruise/bleed easily.   Psychiatric/Behavioral: Negative for suicidal ideas, hallucinations, behavioral problems and confusion.   Intermittent right knee pains      Objective:      /70   Pulse 65   Ht 5' 6" (1.676 m)   Wt 92.4 kg (203 lb 11.3 oz)   SpO2 98%   BMI 32.88 kg/m²   Physical Exam   Constitutional: He is oriented to person, place, and time. He appears well-developed and well-nourished. He is active and cooperative.   Neck: Trachea normal, normal range of motion and full passive range of motion without pain. Neck supple. Normal carotid pulses and no JVD present. Carotid bruit is not present. No tracheal deviation present. No mass and no thyromegaly present.   Cardiovascular: Normal rate, regular rhythm, S1 normal, S2 normal, normal heart sounds and intact distal pulses.  Exam reveals no gallop and no friction rub.    No murmur heard.  Pulmonary/Chest: Effort normal and breath sounds normal. No respiratory distress. He has no wheezes. He has no rales.   Abdominal: Soft. Normal appearance and bowel sounds are normal. He exhibits no distension and no mass. There is no hepatosplenomegaly. There is no tenderness. There is no rebound and no guarding.   Musculoskeletal: Normal range of motion. He exhibits no edema and no tenderness.   Neurological: He is alert and oriented to person, place, and time. He has normal strength. No cranial nerve deficit. Coordination normal.   Skin: Skin is warm, dry and intact. No rash noted. No cyanosis or erythema. No pallor. Nails show no clubbing.   Psychiatric: He has a normal mood and affect. His speech is normal and behavior is normal. Judgment and thought content normal.           Assessment:       1. Preventative health care    2. Hyperuricemia    3. Essential hypertension    4. History of CVA (cerebrovascular accident)    5. Mixed hyperlipidemia    6. Coronary artery disease involving native coronary artery of native heart without angina pectoris    7. " Chronic pain of left knee            Plan:       Preventative health care  -     Uric Acid; Future; Expected date: 04/08/2025  -     Hemoglobin A1C; Future; Expected date: 04/08/2025  -     PSA, Screening; Future; Expected date: 04/08/2025  -     Lipid Panel; Future; Expected date: 04/08/2025  -     CBC Auto Differential; Future; Expected date: 04/08/2025  -     Comprehensive Metabolic Panel; Future; Expected date: 04/08/2025    Hyperuricemia    Essential hypertension    History of CVA (cerebrovascular accident)    Mixed hyperlipidemia    Coronary artery disease involving native coronary artery of native heart without angina pectoris    Chronic pain of left knee  -     methylPREDNISolone (MEDROL DOSEPACK) 4 mg tablet; use as directed  Dispense: 21 each; Refill: 0        Labs today  Continue present meds  Continue indomethacin PRN  Continue allopurinol daily  Continue oral B12 1,000mcg daily  Counseled on regular exercise, maintenance of a healthy weight, balanced diet rich in fruits/vegetables and lean protein, and avoidance of unhealthy habits like smoking and excessive alcohol intake.  Goal weight of 190  meds refilled  F/u 12 months

## 2025-04-11 ENCOUNTER — PATIENT MESSAGE (OUTPATIENT)
Dept: ORTHOPEDICS | Facility: CLINIC | Age: 60
End: 2025-04-11
Payer: COMMERCIAL

## 2025-04-11 DIAGNOSIS — S83.242D OTHER TEAR OF MEDIAL MENISCUS OF LEFT KNEE AS CURRENT INJURY, SUBSEQUENT ENCOUNTER: Primary | ICD-10-CM

## 2025-04-23 ENCOUNTER — HOSPITAL ENCOUNTER (OUTPATIENT)
Dept: RADIOLOGY | Facility: HOSPITAL | Age: 60
Discharge: HOME | End: 2025-04-23
Attending: ORTHOPAEDIC SURGERY
Payer: COMMERCIAL

## 2025-04-23 ENCOUNTER — TELEPHONE (OUTPATIENT)
Dept: PHARMACY | Facility: CLINIC | Age: 60
End: 2025-04-23
Payer: COMMERCIAL

## 2025-04-23 DIAGNOSIS — S83.242D OTHER TEAR OF MEDIAL MENISCUS OF LEFT KNEE AS CURRENT INJURY, SUBSEQUENT ENCOUNTER: ICD-10-CM

## 2025-04-23 PROCEDURE — 73721 MRI JNT OF LWR EXTRE W/O DYE: CPT | Mod: TC,PO,LT

## 2025-04-23 PROCEDURE — 73721 MRI JNT OF LWR EXTRE W/O DYE: CPT | Mod: 26,LT,, | Performed by: RADIOLOGY

## 2025-04-24 ENCOUNTER — RESULTS FOLLOW-UP (OUTPATIENT)
Dept: ORTHOPEDICS | Facility: CLINIC | Age: 60
End: 2025-04-24

## 2025-04-24 NOTE — TELEPHONE ENCOUNTER
[facility]/Population Health Chart Review & Patient Outreach Details For Medication Adherence Project    Reason for Outreach Encounter: 3rd Party payor non-compliance report ([employer], [employer], C, etc)  2.  Patient Outreach Method: Reviewed patient chart  and Md7t message  3.   Medication in question:    Hypertension Medications              amLODIPine (NORVASC) 5 MG tablet Take 1 tablet (5 mg total) by mouth every evening.    nitroGLYCERIN (NITROSTAT) 0.4 MG SL tablet Place 1 tablet (0.4 mg total) under the tongue every 5 (five) minutes as needed.    valsartan-hydrochlorothiazide (DIOVAN-HCT) 80-12.5 mg per tablet Take 1 tablet by mouth once daily.                 Valsartan/hctz  last filled  2/20 for 30 day supply      4.  Reviewed and or Updates Made To: Patient Chart  5. Outreach Outcomes and/or actions taken: Sent inquiry to patient: Waiting for response  Additional Notes:

## 2025-04-30 ENCOUNTER — OFFICE VISIT (OUTPATIENT)
Dept: ORTHOPEDICS | Facility: CLINIC | Age: 60
End: 2025-04-30
Payer: COMMERCIAL

## 2025-04-30 VITALS — HEIGHT: 66 IN | RESPIRATION RATE: 18 BRPM | BODY MASS INDEX: 32.73 KG/M2 | WEIGHT: 203.69 LBS

## 2025-04-30 DIAGNOSIS — M17.12 PRIMARY OSTEOARTHRITIS OF LEFT KNEE: Primary | ICD-10-CM

## 2025-04-30 DIAGNOSIS — S83.242D OTHER TEAR OF MEDIAL MENISCUS OF LEFT KNEE AS CURRENT INJURY, SUBSEQUENT ENCOUNTER: Primary | ICD-10-CM

## 2025-04-30 DIAGNOSIS — M17.10 ARTHRITIS OF KNEE: ICD-10-CM

## 2025-04-30 PROCEDURE — 99999 PR PBB SHADOW E&M-EST. PATIENT-LVL IV: CPT | Mod: PBBFAC,,, | Performed by: ORTHOPAEDIC SURGERY

## 2025-04-30 PROCEDURE — 3008F BODY MASS INDEX DOCD: CPT | Mod: CPTII,S$GLB,, | Performed by: ORTHOPAEDIC SURGERY

## 2025-04-30 PROCEDURE — 3044F HG A1C LEVEL LT 7.0%: CPT | Mod: CPTII,S$GLB,, | Performed by: ORTHOPAEDIC SURGERY

## 2025-04-30 PROCEDURE — 1159F MED LIST DOCD IN RCRD: CPT | Mod: CPTII,S$GLB,, | Performed by: ORTHOPAEDIC SURGERY

## 2025-04-30 PROCEDURE — 99214 OFFICE O/P EST MOD 30 MIN: CPT | Mod: S$GLB,,, | Performed by: ORTHOPAEDIC SURGERY

## 2025-04-30 PROCEDURE — 1160F RVW MEDS BY RX/DR IN RCRD: CPT | Mod: CPTII,S$GLB,, | Performed by: ORTHOPAEDIC SURGERY

## 2025-04-30 NOTE — PROGRESS NOTES
Past Medical History:   Diagnosis Date    Anemia     Anticoagulant long-term use     B12 deficiency anemia     CAD (coronary artery disease)     Carotid atherosclerosis     Essential hypertension 7/8/2015    Family history of ischemic heart disease (IHD) 1/31/2012    Hemorrhoid     History of CVA (cerebrovascular accident) 5/9/2019    History of PTCA     (7/12 RCA BMS 2.75x12) 8/3/2012    LAD/CX- LI. RCA- occluded     HLD (hyperlipidemia)     Hypertension     Hyperuricemia     right foot and left wrist    Sleep apnea     mild ---does not need a C-PAP    Stroke 1/2012       Past Surgical History:   Procedure Laterality Date    COLONOSCOPY N/A 12/11/2017    Procedure: COLONOSCOPY;  Surgeon: Lemuel Vigil MD;  Location: [facility] ENDO;  Service: Endoscopy;  Laterality: N/A;    CORONARY ANGIOGRAPHY N/A 9/18/2019    Procedure: ANGIOGRAM, CORONARY ARTERY;  Surgeon: Clem Renteria MD;  Location: [facility] [place] CATH;  Service: Cardiology;  Laterality: N/A;    CORONARY ANGIOPLASTY WITH STENT PLACEMENT      7/2012    LEFT HEART CATHETERIZATION Left 9/18/2019    Procedure: Left heart cath;  Surgeon: Clem Renteria MD;  Location: [facility] [place] CATH;  Service: Cardiology;  Laterality: Left;       Current Outpatient Medications   Medication Sig    allopurinoL (ZYLOPRIM) 300 MG tablet Take 1 tablet (300 mg total) by mouth once daily.    amLODIPine (NORVASC) 5 MG tablet Take 1 tablet (5 mg total) by mouth every evening.    aspirin 81 mg Tab Take 81 mg by mouth once daily.    clopidogreL (PLAVIX) 75 mg tablet Take 1 tablet (75 mg total) by mouth once daily.    ezetimibe (ZETIA) 10 mg tablet Take 1 tablet (10 mg total) by mouth once daily.    nitroGLYCERIN (NITROSTAT) 0.4 MG SL tablet Place 1 tablet (0.4 mg total) under the tongue every 5 (five) minutes as needed.    rosuvastatin (CRESTOR) 10 MG tablet Take 1 tablet (10 mg total) by mouth every evening.    valsartan-hydrochlorothiazide (DIOVAN-HCT) 80-12.5 mg per tablet Take 1 tablet by mouth once  daily.     Current Facility-Administered Medications   Medication    acetaminophen tablet 650 mg    albuterol inhaler 2 puff    diphenhydrAMINE injection 25 mg    EPINEPHrine injection 0.3 mg    ondansetron disintegrating tablet 4 mg    sodium chloride 0.9% 500 mL flush bag    sodium chloride 0.9% flush 10 mL       Review of patient's allergies indicates:   Allergen Reactions    Codeine Other (See Comments)     Other reaction(s): Shortness of breath  Other reaction(s): Flatulence  Other reaction(s): Difficulty breathing  fainting    Niacin      Other reaction(s): Rash  Other reaction(s): Flushing (skin)    Penicillins      Other reaction(s): Itching  Other reaction(s): Hives  Other reaction(s): Difficulty breathing       Family History   Problem Relation Name Age of Onset    Hypertension Mother      Clotting disorder Father      Hypertension Father      Hypertension Sister      Diabetes Maternal Grandmother      Stroke Maternal Grandmother      Hypertension Paternal Aunt      Stroke Paternal Aunt      Hypertension Paternal Uncle      Stroke Paternal Uncle         Social History     Socioeconomic History    Marital status:     Number of children: 3   Occupational History    Occupation: [employer]     Employer: [employer]   Tobacco Use    Smoking status: Never    Smokeless tobacco: Former     Quit date: 2/24/2006   Substance and Sexual Activity    Alcohol use: No     Comment: quit 20 years ago    Drug use: No    Sexual activity: Yes     Partners: Female     Birth control/protection: None       Chief Complaint:   Chief Complaint   Patient presents with    Follow-up     Follow up post MRI left knee        History of present illness:  60-year-old male seen for left knee pain.  Patient thinks that he possibly injured or aggravated his left knee around October 19, 2024.  Patient was walking down some bleachers and kind of stumbled, twisting his knee.  Did not notice a lot of pain at that time but had pain and soreness  over the medial aspect of his knee the following morning.  Pain is with deep squatting and twisting.  We did a cortisone injection on him about 6 months ago.  Worked great for about 5 months..  Knee swelled up again and started hurting again along the medial aspect.  We tried 1 more cortisone injection that was not as successful.  We got an MRI of the left knee.        Review of Systems:  Musculoskeletal:  See HPI        Physical Examination:    Vital Signs:    Vitals:    04/30/25 1418   Resp: 18       Body mass index is 32.88 kg/m².    This a well-developed, well nourished patient in no acute distress.  They are alert and oriented and cooperative to examination.  Pt. walks without an antalgic gait.      Examination of the left knee shows no rashes or erythema. There are no masses ecchymosis and a moderate effusion. Patient has full range of motion from 0-130°. Patient is nontender to palpation over lateral joint line and moderately tender to palpation over the medial joint line.  Knee is stable to varus and valgus stress. 5 out of 5 motor strength. Palpable distal pulses. Intact light touch sensation. Negative Patellofemoral crepitus      X-rays:  X-rays of the left knee are available for review which show some very minimal arthritic change    MRI of the left knee is reviewed and interpreted:1. Complex tearing of the posterior horn and body of the medial meniscus  2. Full-thickness chondral loss of both the medial femoral condyle and medial tibial plateau with some underlying bony edema bipolar     Assessment:  Left medial meniscal tear with effusion  Moderate to severe left medial compartment knee arthritis    Plan:  I reviewed the findings with him today.  We talked about meniscal tears.  Patient unfortunately has pretty significant chondral wear of the medial compartment already as well.  Recommended a viscosupplementation series.  We will get authorization for that and start once approved.    The patient has  tried a self guided exercise program that has included walking without significant improvement. Minimal relief with tylenol or OTC Nsaids. Reports less than 8 weeks relief with IA steroid injection. Kellgren Jeremiah scale of 3. They are not receiving another HA injectable at this time. I will precert for gel injection.                 All previous pertinent notes including ER visits, physical therapy visits, other orthopedic visits as well as other care for the same musculoskeletal problem were reviewed.  All pertinent lab values and previous imaging was reviewed pertinent to the current visit.    This note was created using [employer] management voice recognition software that occasionally misinterpreted phrases or words.    Consult note is delivered via Epic messaging service.\

## 2025-05-12 ENCOUNTER — PATIENT MESSAGE (OUTPATIENT)
Dept: ORTHOPEDICS | Facility: CLINIC | Age: 60
End: 2025-05-12
Payer: COMMERCIAL

## 2025-05-14 ENCOUNTER — OFFICE VISIT (OUTPATIENT)
Dept: ORTHOPEDICS | Facility: CLINIC | Age: 60
End: 2025-05-14
Payer: COMMERCIAL

## 2025-05-14 VITALS — WEIGHT: 203.69 LBS | HEIGHT: 66 IN | BODY MASS INDEX: 32.73 KG/M2

## 2025-05-14 DIAGNOSIS — M17.12 PRIMARY OSTEOARTHRITIS OF LEFT KNEE: ICD-10-CM

## 2025-05-14 DIAGNOSIS — M17.10 ARTHRITIS OF KNEE: Primary | ICD-10-CM

## 2025-05-14 PROCEDURE — 99999 PR PBB SHADOW E&M-EST. PATIENT-LVL III: CPT | Mod: PBBFAC,,, | Performed by: ORTHOPAEDIC SURGERY

## 2025-05-14 NOTE — PROGRESS NOTES
Past Medical History:   Diagnosis Date    Anemia     Anticoagulant long-term use     B12 deficiency anemia     CAD (coronary artery disease)     Carotid atherosclerosis     Essential hypertension 7/8/2015    Family history of ischemic heart disease (IHD) 1/31/2012    Hemorrhoid     History of CVA (cerebrovascular accident) 5/9/2019    History of PTCA     (7/12 RCA BMS 2.75x12) 8/3/2012    LAD/CX- LI. RCA- occluded     HLD (hyperlipidemia)     Hypertension     Hyperuricemia     right foot and left wrist    Sleep apnea     mild ---does not need a C-PAP    Stroke 1/2012       Past Surgical History:   Procedure Laterality Date    COLONOSCOPY N/A 12/11/2017    Procedure: COLONOSCOPY;  Surgeon: Lemuel Vigil MD;  Location: [facility] ENDO;  Service: Endoscopy;  Laterality: N/A;    CORONARY ANGIOGRAPHY N/A 9/18/2019    Procedure: ANGIOGRAM, CORONARY ARTERY;  Surgeon: Clem Renteria MD;  Location: [facility] CATH;  Service: Cardiology;  Laterality: N/A;    CORONARY ANGIOPLASTY WITH STENT PLACEMENT      7/2012    LEFT HEART CATHETERIZATION Left 9/18/2019    Procedure: Left heart cath;  Surgeon: Clem Renteria MD;  Location: [facility] CATH;  Service: Cardiology;  Laterality: Left;       Current Outpatient Medications   Medication Sig    allopurinoL (ZYLOPRIM) 300 MG tablet Take 1 tablet (300 mg total) by mouth once daily.    amLODIPine (NORVASC) 5 MG tablet Take 1 tablet (5 mg total) by mouth every evening.    aspirin 81 mg Tab Take 81 mg by mouth once daily.    clopidogreL (PLAVIX) 75 mg tablet Take 1 tablet (75 mg total) by mouth once daily.    ezetimibe (ZETIA) 10 mg tablet Take 1 tablet (10 mg total) by mouth once daily.    nitroGLYCERIN (NITROSTAT) 0.4 MG SL tablet Place 1 tablet (0.4 mg total) under the tongue every 5 (five) minutes as needed.    rosuvastatin (CRESTOR) 10 MG tablet Take 1 tablet (10 mg total) by mouth every evening.    valsartan-hydrochlorothiazide (DIOVAN-HCT) 80-12.5 mg per tablet Take 1 tablet by mouth once  daily.     Current Facility-Administered Medications   Medication    acetaminophen tablet 650 mg    albuterol inhaler 2 puff    diphenhydrAMINE injection 25 mg    EPINEPHrine injection 0.3 mg    ondansetron disintegrating tablet 4 mg    sodium chloride 0.9% 500 mL flush bag    sodium chloride 0.9% flush 10 mL       Review of patient's allergies indicates:   Allergen Reactions    Codeine Other (See Comments)     Other reaction(s): Shortness of breath  Other reaction(s): Flatulence  Other reaction(s): Difficulty breathing  fainting    Niacin      Other reaction(s): Rash  Other reaction(s): Flushing (skin)    Penicillins      Other reaction(s): Itching  Other reaction(s): Hives  Other reaction(s): Difficulty breathing       Family History   Problem Relation Name Age of Onset    Hypertension Mother      Clotting disorder Father      Hypertension Father      Hypertension Sister      Diabetes Maternal Grandmother      Stroke Maternal Grandmother      Hypertension Paternal Aunt      Stroke Paternal Aunt      Hypertension Paternal Uncle      Stroke Paternal Uncle         Social History     Socioeconomic History    Marital status:     Number of children: 3   Occupational History    Occupation: [employer]     Employer: [employer]   Tobacco Use    Smoking status: Never    Smokeless tobacco: Former     Quit date: 2/24/2006   Substance and Sexual Activity    Alcohol use: No     Comment: quit 20 years ago    Drug use: No    Sexual activity: Yes     Partners: Female     Birth control/protection: None       Chief Complaint:   Chief Complaint   Patient presents with    Injections     L euflexxa 1/3       History of present illness:  60-year-old male seen for left knee pain.  Patient thinks that he possibly injured or aggravated his left knee around October 19, 2024.  Patient was walking down some bleachers and kind of stumbled, twisting his knee.  Did not notice a lot of pain at that time but had pain and soreness over the  medial aspect of his knee the following morning.  Pain is with deep squatting and twisting.  We did a cortisone injection on him about 6 months ago.  Worked great for about 5 months..  Knee swelled up again and started hurting again along the medial aspect.  We tried 1 more cortisone injection that was not as successful.  We got an MRI of the left knee.        Review of Systems:  Musculoskeletal:  See HPI        Physical Examination:    Vital Signs:    There were no vitals filed for this visit.      Body mass index is 32.88 kg/m².    This a well-developed, well nourished patient in no acute distress.  They are alert and oriented and cooperative to examination.  Pt. walks without an antalgic gait.      Examination of the left knee shows no rashes or erythema. There are no masses ecchymosis and a moderate effusion. Patient has full range of motion from 0-130°. Patient is nontender to palpation over lateral joint line and moderately tender to palpation over the medial joint line.  Knee is stable to varus and valgus stress. 5 out of 5 motor strength. Palpable distal pulses. Intact light touch sensation. Negative Patellofemoral crepitus      X-rays:  X-rays of the left knee are available for review which show some very minimal arthritic change    MRI of the left knee is reviewed and interpreted:1. Complex tearing of the posterior horn and body of the medial meniscus  2. Full-thickness chondral loss of both the medial femoral condyle and medial tibial plateau with some underlying bony edema bipolar     Assessment:  Left medial meniscal tear with effusion  Moderate to severe left medial compartment knee arthritis    Plan:  I reviewed the findings with him today.  I injected his left knee with Euflexxa 1 of 3.  Follow up next week    The patient has tried a self guided exercise program that has included walking without significant improvement. Minimal relief with tylenol or OTC Nsaids. Reports less than 8 weeks relief with  IA steroid injection. Kellgren Jeremiah scale of 3. They are not receiving another HA injectable at this time. I will precert for gel injection.                 All previous pertinent notes including ER visits, physical therapy visits, other orthopedic visits as well as other care for the same musculoskeletal problem were reviewed.  All pertinent lab values and previous imaging was reviewed pertinent to the current visit.    This note was created using [employer] voice recognition software that occasionally misinterpreted phrases or words.    Consult note is delivered via Epic messaging service.\

## 2025-05-14 NOTE — PROCEDURES
Large Joint Aspiration/Injection: L knee joint    Date/Time: 5/14/2025 3:15 PM    Performed by: Josse Chacon MD  Authorized by: Josse Chacon MD    Consent Done?:  Yes (Verbal)  Indications:  Pain  Site marked: the procedure site was marked    Timeout: prior to procedure the correct patient, procedure, and site was verified      Details:  Needle Size:  20 G  Approach:  Anterolateral  Location:  Knee  Site:  L knee joint  Medications:  20 mg sodium hyaluronate (EUFLEXXA) 10 mg/mL(mw 2.4 -3.6 million)  Patient tolerance:  Patient tolerated the procedure well with no immediate complications

## 2025-05-21 ENCOUNTER — OFFICE VISIT (OUTPATIENT)
Dept: ORTHOPEDICS | Facility: CLINIC | Age: 60
End: 2025-05-21
Payer: COMMERCIAL

## 2025-05-21 ENCOUNTER — TELEPHONE (OUTPATIENT)
Dept: PHARMACY | Facility: CLINIC | Age: 60
End: 2025-05-21
Payer: COMMERCIAL

## 2025-05-21 VITALS — BODY MASS INDEX: 32.73 KG/M2 | WEIGHT: 203.69 LBS | HEIGHT: 66 IN

## 2025-05-21 DIAGNOSIS — M17.10 ARTHRITIS OF KNEE: Primary | ICD-10-CM

## 2025-05-21 PROCEDURE — 20610 DRAIN/INJ JOINT/BURSA W/O US: CPT | Mod: LT,S$GLB,, | Performed by: ORTHOPAEDIC SURGERY

## 2025-05-21 PROCEDURE — 99999 PR PBB SHADOW E&M-EST. PATIENT-LVL III: CPT | Mod: PBBFAC,,, | Performed by: ORTHOPAEDIC SURGERY

## 2025-05-21 PROCEDURE — 99499 UNLISTED E&M SERVICE: CPT | Mod: 25,S$GLB,, | Performed by: ORTHOPAEDIC SURGERY

## 2025-05-21 NOTE — PROCEDURES
Large Joint Aspiration/Injection: L knee joint    Date/Time: 5/21/2025 3:30 PM    Performed by: Josse Chacon MD  Authorized by: Josse Chacon MD    Consent Done?:  Yes (Verbal)  Indications:  Pain  Site marked: the procedure site was marked    Timeout: prior to procedure the correct patient, procedure, and site was verified      Details:  Needle Size:  20 G  Approach:  Anterolateral  Location:  Knee  Site:  L knee joint  Medications:  20 mg sodium hyaluronate (EUFLEXXA) 10 mg/mL(mw 2.4 -3.6 million)  Patient tolerance:  Patient tolerated the procedure well with no immediate complications

## 2025-05-21 NOTE — PROGRESS NOTES
Past Medical History:   Diagnosis Date    Anemia     Anticoagulant long-term use     B12 deficiency anemia     CAD (coronary artery disease)     Carotid atherosclerosis     Essential hypertension 7/8/2015    Family history of ischemic heart disease (IHD) 1/31/2012    Hemorrhoid     History of CVA (cerebrovascular accident) 5/9/2019    History of PTCA     (7/12 RCA BMS 2.75x12) 8/3/2012    LAD/CX- LI. RCA- occluded     HLD (hyperlipidemia)     Hypertension     Hyperuricemia     right foot and left wrist    Sleep apnea     mild ---does not need a C-PAP    Stroke 1/2012       Past Surgical History:   Procedure Laterality Date    COLONOSCOPY N/A 12/11/2017    Procedure: COLONOSCOPY;  Surgeon: Lemuel Vigil MD;  Location: [facility] ENDO;  Service: Endoscopy;  Laterality: N/A;    CORONARY ANGIOGRAPHY N/A 9/18/2019    Procedure: ANGIOGRAM, CORONARY ARTERY;  Surgeon: Clem Renteria MD;  Location: [facility] CATH;  Service: Cardiology;  Laterality: N/A;    CORONARY ANGIOPLASTY WITH STENT PLACEMENT      7/2012    LEFT HEART CATHETERIZATION Left 9/18/2019    Procedure: Left heart cath;  Surgeon: Clem Renteria MD;  Location: [facility] CATH;  Service: Cardiology;  Laterality: Left;       Current Outpatient Medications   Medication Sig    allopurinoL (ZYLOPRIM) 300 MG tablet Take 1 tablet (300 mg total) by mouth once daily.    amLODIPine (NORVASC) 5 MG tablet Take 1 tablet (5 mg total) by mouth every evening.    aspirin 81 mg Tab Take 81 mg by mouth once daily.    clopidogreL (PLAVIX) 75 mg tablet Take 1 tablet (75 mg total) by mouth once daily.    ezetimibe (ZETIA) 10 mg tablet Take 1 tablet (10 mg total) by mouth once daily.    nitroGLYCERIN (NITROSTAT) 0.4 MG SL tablet Place 1 tablet (0.4 mg total) under the tongue every 5 (five) minutes as needed.    rosuvastatin (CRESTOR) 10 MG tablet Take 1 tablet (10 mg total) by mouth every evening.    valsartan-hydrochlorothiazide (DIOVAN-HCT) 80-12.5 mg per tablet Take 1 tablet by mouth once  daily.     Current Facility-Administered Medications   Medication    acetaminophen tablet 650 mg    albuterol inhaler 2 puff    diphenhydrAMINE injection 25 mg    EPINEPHrine injection 0.3 mg    ondansetron disintegrating tablet 4 mg    sodium chloride 0.9% 500 mL flush bag    sodium chloride 0.9% flush 10 mL       Review of patient's allergies indicates:   Allergen Reactions    Codeine Other (See Comments)     Other reaction(s): Shortness of breath  Other reaction(s): Flatulence  Other reaction(s): Difficulty breathing  fainting    Niacin      Other reaction(s): Rash  Other reaction(s): Flushing (skin)    Penicillins      Other reaction(s): Itching  Other reaction(s): Hives  Other reaction(s): Difficulty breathing       Family History   Problem Relation Name Age of Onset    Hypertension Mother      Clotting disorder Father      Hypertension Father      Hypertension Sister      Diabetes Maternal Grandmother      Stroke Maternal Grandmother      Hypertension Paternal Aunt      Stroke Paternal Aunt      Hypertension Paternal Uncle      Stroke Paternal Uncle         Social History     Socioeconomic History    Marital status:     Number of children: 3   Occupational History    Occupation: [employer]     Employer: [employer]   Tobacco Use    Smoking status: Never    Smokeless tobacco: Former     Quit date: 2/24/2006   Substance and Sexual Activity    Alcohol use: No     Comment: quit 20 years ago    Drug use: No    Sexual activity: Yes     Partners: Female     Birth control/protection: None       Chief Complaint:   No chief complaint on file.      History of present illness:  60-year-old male seen for left knee pain.  Patient thinks that he possibly injured or aggravated his left knee around October 19, 2024.  Patient was walking down some bleachers and kind of stumbled, twisting his knee.  Did not notice a lot of pain at that time but had pain and soreness over the medial aspect of his knee the following morning.   Pain is with deep squatting and twisting.  We did a cortisone injection on him about 6 months ago.  Worked great for about 5 months..  Knee swelled up again and started hurting again along the medial aspect.  We tried 1 more cortisone injection that was not as successful.  We got an MRI of the left knee.        Review of Systems:  Musculoskeletal:  See HPI        Physical Examination:    Vital Signs:    There were no vitals filed for this visit.      There is no height or weight on file to calculate BMI.    This a well-developed, well nourished patient in no acute distress.  They are alert and oriented and cooperative to examination.  Pt. walks without an antalgic gait.      Examination of the left knee shows no rashes or erythema. There are no masses ecchymosis and a moderate effusion. Patient has full range of motion from 0-130°. Patient is nontender to palpation over lateral joint line and moderately tender to palpation over the medial joint line.  Knee is stable to varus and valgus stress. 5 out of 5 motor strength. Palpable distal pulses. Intact light touch sensation. Negative Patellofemoral crepitus      X-rays:  X-rays of the left knee are available for review which show some very minimal arthritic change    MRI of the left knee is reviewed and interpreted:1. Complex tearing of the posterior horn and body of the medial meniscus  2. Full-thickness chondral loss of both the medial femoral condyle and medial tibial plateau with some underlying bony edema bipolar     Assessment:  Left medial meniscal tear with effusion  Moderate to severe left medial compartment knee arthritis    Plan:  I reviewed the findings with him today.  I injected his left knee with Euflexxa 2 of 3.  Follow up next week    The patient has tried a self guided exercise program that has included walking without significant improvement. Minimal relief with tylenol or OTC Nsaids. Reports less than 8 weeks relief with IA steroid injection.  Kellgren Jeremiah scale of 3. They are not receiving another HA injectable at this time. I will precert for gel injection.                 All previous pertinent notes including ER visits, physical therapy visits, other orthopedic visits as well as other care for the same musculoskeletal problem were reviewed.  All pertinent lab values and previous imaging was reviewed pertinent to the current visit.    This note was created using [employer] voice recognition software that occasionally misinterpreted phrases or words.    Consult note is delivered via Epic messaging service.\

## 2025-05-22 NOTE — TELEPHONE ENCOUNTER
[facility]/Population Health Chart Review & Patient Outreach Details For Medication Adherence Project    Reason for Outreach Encounter: 3rd Party payor non-compliance report (Humana, BCBS, UHC, etc)  2.  Patient Outreach Method: Reviewed patient chart  and [employer] message  3.   Medication in question:    Hypertension Medications              amLODIPine (NORVASC) 5 MG tablet Take 1 tablet (5 mg total) by mouth every evening.    nitroGLYCERIN (NITROSTAT) 0.4 MG SL tablet Place 1 tablet (0.4 mg total) under the tongue every 5 (five) minutes as needed.    valsartan-hydrochlorothiazide (DIOVAN-HCT) 80-12.5 mg per tablet Take 1 tablet by mouth once daily.                 valsartan-hydrochlorothiazide   last filled  2/20/25 for 90 day supply      4.  Reviewed and or Updates Made To: Patient Chart  5. Outreach Outcomes and/or actions taken: Sent inquiry to patient: Waiting for response and Patient reminded to  prescription  Additional Notes:

## 2025-05-28 ENCOUNTER — OFFICE VISIT (OUTPATIENT)
Dept: ORTHOPEDICS | Facility: CLINIC | Age: 60
End: 2025-05-28
Payer: COMMERCIAL

## 2025-05-28 VITALS — WEIGHT: 203.69 LBS | HEIGHT: 66 IN | RESPIRATION RATE: 18 BRPM | BODY MASS INDEX: 32.73 KG/M2

## 2025-05-28 DIAGNOSIS — M17.10 ARTHRITIS OF KNEE: Primary | ICD-10-CM

## 2025-05-28 PROCEDURE — 99999 PR PBB SHADOW E&M-EST. PATIENT-LVL III: CPT | Mod: PBBFAC,,, | Performed by: ORTHOPAEDIC SURGERY

## 2025-05-28 PROCEDURE — 20610 DRAIN/INJ JOINT/BURSA W/O US: CPT | Mod: LT,S$GLB,, | Performed by: ORTHOPAEDIC SURGERY

## 2025-05-28 PROCEDURE — 99499 UNLISTED E&M SERVICE: CPT | Mod: 25,S$GLB,, | Performed by: ORTHOPAEDIC SURGERY

## 2025-05-28 NOTE — PROGRESS NOTES
Past Medical History:   Diagnosis Date    Anemia     Anticoagulant long-term use     B12 deficiency anemia     CAD (coronary artery disease)     Carotid atherosclerosis     Essential hypertension 7/8/2015    Family history of ischemic heart disease (IHD) 1/31/2012    Hemorrhoid     History of CVA (cerebrovascular accident) 5/9/2019    History of PTCA     (7/12 RCA BMS 2.75x12) 8/3/2012    LAD/CX- LI. RCA- occluded     HLD (hyperlipidemia)     Hypertension     Hyperuricemia     right foot and left wrist    Sleep apnea     mild ---does not need a C-PAP    Stroke 1/2012       Past Surgical History:   Procedure Laterality Date    COLONOSCOPY N/A 12/11/2017    Procedure: COLONOSCOPY;  Surgeon: Lemuel Vigil MD;  Location: [facility] ENDO;  Service: Endoscopy;  Laterality: N/A;    CORONARY ANGIOGRAPHY N/A 9/18/2019    Procedure: ANGIOGRAM, CORONARY ARTERY;  Surgeon: Clem Renteria MD;  Location: [facility] CATH;  Service: Cardiology;  Laterality: N/A;    CORONARY ANGIOPLASTY WITH STENT PLACEMENT      7/2012    LEFT HEART CATHETERIZATION Left 9/18/2019    Procedure: Left heart cath;  Surgeon: Clem Renteria MD;  Location: [facility] CATH;  Service: Cardiology;  Laterality: Left;       Current Outpatient Medications   Medication Sig    allopurinoL (ZYLOPRIM) 300 MG tablet Take 1 tablet (300 mg total) by mouth once daily.    amLODIPine (NORVASC) 5 MG tablet Take 1 tablet (5 mg total) by mouth every evening.    aspirin 81 mg Tab Take 81 mg by mouth once daily.    clopidogreL (PLAVIX) 75 mg tablet Take 1 tablet (75 mg total) by mouth once daily.    ezetimibe (ZETIA) 10 mg tablet Take 1 tablet (10 mg total) by mouth once daily.    nitroGLYCERIN (NITROSTAT) 0.4 MG SL tablet Place 1 tablet (0.4 mg total) under the tongue every 5 (five) minutes as needed.    rosuvastatin (CRESTOR) 10 MG tablet Take 1 tablet (10 mg total) by mouth every evening.    valsartan-hydrochlorothiazide (DIOVAN-HCT) 80-12.5 mg per tablet Take 1 tablet by mouth once  daily.     Current Facility-Administered Medications   Medication    acetaminophen tablet 650 mg    albuterol inhaler 2 puff    diphenhydrAMINE injection 25 mg    EPINEPHrine injection 0.3 mg    ondansetron disintegrating tablet 4 mg    sodium chloride 0.9% 500 mL flush bag    sodium chloride 0.9% flush 10 mL       Review of patient's allergies indicates:   Allergen Reactions    Codeine Other (See Comments)     Other reaction(s): Shortness of breath  Other reaction(s): Flatulence  Other reaction(s): Difficulty breathing  fainting    Niacin      Other reaction(s): Rash  Other reaction(s): Flushing (skin)    Penicillins      Other reaction(s): Itching  Other reaction(s): Hives  Other reaction(s): Difficulty breathing       Family History   Problem Relation Name Age of Onset    Hypertension Mother      Clotting disorder Father      Hypertension Father      Hypertension Sister      Diabetes Maternal Grandmother      Stroke Maternal Grandmother      Hypertension Paternal Aunt      Stroke Paternal Aunt      Hypertension Paternal Uncle      Stroke Paternal Uncle         Social History     Socioeconomic History    Marital status:     Number of children: 3   Occupational History    Occupation: [employer]     Employer: [employer]   Tobacco Use    Smoking status: Never    Smokeless tobacco: Former     Quit date: 2/24/2006   Substance and Sexual Activity    Alcohol use: No     Comment: quit 20 years ago    Drug use: No    Sexual activity: Yes     Partners: Female     Birth control/protection: None       Chief Complaint:   Chief Complaint   Patient presents with    Follow-up     Left euflexxa 3/3       History of present illness:  60-year-old male seen for left knee pain.  Patient thinks that he possibly injured or aggravated his left knee around October 19, 2024.  Patient was walking down some bleachers and kind of stumbled, twisting his knee.  Did not notice a lot of pain at that time but had pain and soreness over the  medial aspect of his knee the following morning.  Pain is with deep squatting and twisting.  We did a cortisone injection on him about 6 months ago.  Worked great for about 5 months..  Knee swelled up again and started hurting again along the medial aspect.  We tried 1 more cortisone injection that was not as successful.  We got an MRI of the left knee.        Review of Systems:  Musculoskeletal:  See HPI        Physical Examination:    Vital Signs:    Vitals:    05/28/25 0807   Resp: 18         Body mass index is 32.88 kg/m².    This a well-developed, well nourished patient in no acute distress.  They are alert and oriented and cooperative to examination.  Pt. walks without an antalgic gait.      Examination of the left knee shows no rashes or erythema. There are no masses ecchymosis and a moderate effusion. Patient has full range of motion from 0-130°. Patient is nontender to palpation over lateral joint line and moderately tender to palpation over the medial joint line.  Knee is stable to varus and valgus stress. 5 out of 5 motor strength. Palpable distal pulses. Intact light touch sensation. Negative Patellofemoral crepitus      X-rays:  X-rays of the left knee are available for review which show some very minimal arthritic change    MRI of the left knee is reviewed and interpreted:1. Complex tearing of the posterior horn and body of the medial meniscus  2. Full-thickness chondral loss of both the medial femoral condyle and medial tibial plateau with some underlying bony edema bipolar     Assessment:  Left medial meniscal tear with effusion  Moderate to severe left medial compartment knee arthritis    Plan:  I reviewed the findings with him today.  I injected his left knee with Euflexxa 3 of 3.  Follow up as needed      The patient has tried a self guided exercise program that has included walking without significant improvement. Minimal relief with tylenol or OTC Nsaids. Reports less than 8 weeks relief with IA  steroid injection. Kellgren Jeremiah scale of 3. They are not receiving another HA injectable at this time. I will precert for gel injection.           All previous pertinent notes including ER visits, physical therapy visits, other orthopedic visits as well as other care for the same musculoskeletal problem were reviewed.  All pertinent lab values and previous imaging was reviewed pertinent to the current visit.    This note was created using [employer] voice recognition software that occasionally misinterpreted phrases or words.    Consult note is delivered via Epic messaging service.\

## 2025-05-28 NOTE — PROCEDURES
Large Joint Aspiration/Injection: L knee joint    Date/Time: 5/28/2025 3:30 PM    Performed by: Josse Chacon MD  Authorized by: Josse Chacon MD    Consent Done?:  Yes (Verbal)  Indications:  Pain  Site marked: the procedure site was marked    Timeout: prior to procedure the correct patient, procedure, and site was verified      Details:  Needle Size:  20 G  Approach:  Anterolateral  Location:  Knee  Site:  L knee joint  Medications:  20 mg sodium hyaluronate (EUFLEXXA) 10 mg/mL(mw 2.4 -3.6 million)  Patient tolerance:  Patient tolerated the procedure well with no immediate complications

## 2025-06-17 ENCOUNTER — TELEPHONE (OUTPATIENT)
Dept: PHARMACY | Facility: CLINIC | Age: 60
End: 2025-06-17
Payer: COMMERCIAL

## 2025-06-18 NOTE — TELEPHONE ENCOUNTER
[facility]/Population Health Chart Review & Patient Outreach Details For Medication Adherence Project    Reason for Outreach Encounter: 3rd Party payor non-compliance report (Humana, BCBS, C, etc)  2.  Patient Outreach Method: Reviewed Patient Chart  3.   Medication in question: diovan hct    LAST FILLED: 5/22/25 for 30 day supply  Hypertension Medications              amLODIPine (NORVASC) 5 MG tablet Take 1 tablet (5 mg total) by mouth every evening.    nitroGLYCERIN (NITROSTAT) 0.4 MG SL tablet Place 1 tablet (0.4 mg total) under the tongue every 5 (five) minutes as needed.    valsartan-hydrochlorothiazide (DIOVAN-HCT) 80-12.5 mg per tablet Take 1 tablet by mouth once daily.              4.  Reviewed and or Updates Made To: Patient Chart  5. Outreach Outcomes and/or actions taken: Patient filled medication and is on track to be adherent

## 2025-07-29 ENCOUNTER — TELEPHONE (OUTPATIENT)
Dept: PHARMACY | Facility: CLINIC | Age: 60
End: 2025-07-29
Payer: COMMERCIAL

## 2025-07-29 NOTE — TELEPHONE ENCOUNTER
[facility]/Population Health Chart Review & Patient Outreach Details For Medication Adherence Project    Reason for Outreach Encounter: 3rd Party payor non-compliance report ([employer], [employer], [facility], etc)  2.  Patient Outreach Method: [employer] message  3.   Medication in question: diovan hct   LAST FILLED: 6/20/25 for 30 day supply  Hypertension Medications              amLODIPine (NORVASC) 2.5 MG tablet Take 1 tablet (2.5 mg total) by mouth every evening.    nitroGLYCERIN (NITROSTAT) 0.4 MG SL tablet Place 1 tablet (0.4 mg total) under the tongue every 5 (five) minutes as needed.    valsartan-hydrochlorothiazide (DIOVAN-HCT) 160-12.5 mg per tablet Take 1 tablet by mouth once daily.              4.  Reviewed and or Updates Made To: Patient Chart  5. Outreach Outcomes and/or actions taken: Sent inquiry to patient: Waiting for response.

## 2025-08-13 ENCOUNTER — PATIENT MESSAGE (OUTPATIENT)
Dept: ORTHOPEDICS | Facility: CLINIC | Age: 60
End: 2025-08-13

## 2025-08-13 ENCOUNTER — OFFICE VISIT (OUTPATIENT)
Dept: ORTHOPEDICS | Facility: CLINIC | Age: 60
End: 2025-08-13
Payer: COMMERCIAL

## 2025-08-13 ENCOUNTER — TELEPHONE (OUTPATIENT)
Dept: PHARMACY | Facility: CLINIC | Age: 60
End: 2025-08-13
Payer: COMMERCIAL

## 2025-08-13 ENCOUNTER — TELEPHONE (OUTPATIENT)
Dept: URGENT CARE | Facility: CLINIC | Age: 60
End: 2025-08-13
Payer: COMMERCIAL

## 2025-08-13 VITALS — RESPIRATION RATE: 18 BRPM | HEIGHT: 66 IN | BODY MASS INDEX: 32.13 KG/M2 | WEIGHT: 199.94 LBS

## 2025-08-13 DIAGNOSIS — M17.12 PRIMARY OSTEOARTHRITIS OF LEFT KNEE: Primary | ICD-10-CM

## 2025-08-13 DIAGNOSIS — M17.10 ARTHRITIS OF KNEE: Primary | ICD-10-CM

## 2025-08-13 PROCEDURE — 3044F HG A1C LEVEL LT 7.0%: CPT | Mod: CPTII,S$GLB,, | Performed by: ORTHOPAEDIC SURGERY

## 2025-08-13 PROCEDURE — 1159F MED LIST DOCD IN RCRD: CPT | Mod: CPTII,S$GLB,, | Performed by: ORTHOPAEDIC SURGERY

## 2025-08-13 PROCEDURE — 3008F BODY MASS INDEX DOCD: CPT | Mod: CPTII,S$GLB,, | Performed by: ORTHOPAEDIC SURGERY

## 2025-08-13 PROCEDURE — 99999 PR PBB SHADOW E&M-EST. PATIENT-LVL III: CPT | Mod: PBBFAC,,, | Performed by: ORTHOPAEDIC SURGERY

## 2025-08-13 PROCEDURE — 99214 OFFICE O/P EST MOD 30 MIN: CPT | Mod: 57,S$GLB,, | Performed by: ORTHOPAEDIC SURGERY

## 2025-08-14 ENCOUNTER — TELEPHONE (OUTPATIENT)
Dept: FAMILY MEDICINE | Facility: CLINIC | Age: 60
End: 2025-08-14
Payer: COMMERCIAL

## 2025-08-26 ENCOUNTER — TELEPHONE (OUTPATIENT)
Dept: ORTHOPEDICS | Facility: CLINIC | Age: 60
End: 2025-08-26
Payer: COMMERCIAL

## 2025-08-26 ENCOUNTER — OFFICE VISIT (OUTPATIENT)
Dept: FAMILY MEDICINE | Facility: CLINIC | Age: 60
End: 2025-08-26
Payer: COMMERCIAL

## 2025-08-26 VITALS
HEART RATE: 61 BPM | DIASTOLIC BLOOD PRESSURE: 68 MMHG | WEIGHT: 200.81 LBS | OXYGEN SATURATION: 97 % | BODY MASS INDEX: 32.27 KG/M2 | SYSTOLIC BLOOD PRESSURE: 130 MMHG | HEIGHT: 66 IN

## 2025-08-26 DIAGNOSIS — I25.10 CORONARY ARTERY DISEASE INVOLVING NATIVE CORONARY ARTERY OF NATIVE HEART WITHOUT ANGINA PECTORIS: ICD-10-CM

## 2025-08-26 DIAGNOSIS — I10 ESSENTIAL HYPERTENSION: ICD-10-CM

## 2025-08-26 DIAGNOSIS — M17.12 PRIMARY OSTEOARTHRITIS OF LEFT KNEE: Primary | ICD-10-CM

## 2025-08-26 DIAGNOSIS — M23.204 DEGENERATIVE TEAR OF MEDIAL MENISCUS, LEFT: ICD-10-CM

## 2025-08-26 PROCEDURE — 3075F SYST BP GE 130 - 139MM HG: CPT | Mod: CPTII,S$GLB,, | Performed by: FAMILY MEDICINE

## 2025-08-26 PROCEDURE — 3078F DIAST BP <80 MM HG: CPT | Mod: CPTII,S$GLB,, | Performed by: FAMILY MEDICINE

## 2025-08-26 PROCEDURE — 3008F BODY MASS INDEX DOCD: CPT | Mod: CPTII,S$GLB,, | Performed by: FAMILY MEDICINE

## 2025-08-26 PROCEDURE — 99214 OFFICE O/P EST MOD 30 MIN: CPT | Mod: S$GLB,,, | Performed by: FAMILY MEDICINE

## 2025-08-26 PROCEDURE — 3044F HG A1C LEVEL LT 7.0%: CPT | Mod: CPTII,S$GLB,, | Performed by: FAMILY MEDICINE

## 2025-08-26 PROCEDURE — 99999 PR PBB SHADOW E&M-EST. PATIENT-LVL III: CPT | Mod: PBBFAC,,, | Performed by: FAMILY MEDICINE

## 2025-08-26 PROCEDURE — 1159F MED LIST DOCD IN RCRD: CPT | Mod: CPTII,S$GLB,, | Performed by: FAMILY MEDICINE

## 2025-08-26 RX ORDER — DICLOFENAC SODIUM 10 MG/G
2 GEL TOPICAL 4 TIMES DAILY
Qty: 100 G | Refills: 5 | Status: SHIPPED | OUTPATIENT
Start: 2025-08-26

## 2025-09-04 ENCOUNTER — CLINICAL SUPPORT (OUTPATIENT)
Dept: REHABILITATION | Facility: HOSPITAL | Age: 60
End: 2025-09-04
Payer: COMMERCIAL

## 2025-09-04 DIAGNOSIS — M25.662 DECREASED ROM OF LEFT KNEE: Primary | ICD-10-CM

## 2025-09-04 PROCEDURE — 97530 THERAPEUTIC ACTIVITIES: CPT | Mod: PO

## 2025-09-04 PROCEDURE — 97161 PT EVAL LOW COMPLEX 20 MIN: CPT | Mod: PO
